# Patient Record
Sex: MALE | Race: WHITE | NOT HISPANIC OR LATINO | Employment: OTHER | URBAN - METROPOLITAN AREA
[De-identification: names, ages, dates, MRNs, and addresses within clinical notes are randomized per-mention and may not be internally consistent; named-entity substitution may affect disease eponyms.]

---

## 2017-06-21 ENCOUNTER — HOSPITAL ENCOUNTER (EMERGENCY)
Facility: CLINIC | Age: 72
Discharge: HOME | End: 2017-06-21
Attending: EMERGENCY MEDICINE | Admitting: EMERGENCY MEDICINE
Payer: MEDICARE

## 2017-06-21 VITALS
TEMPERATURE: 98.4 F | HEART RATE: 129 BPM | OXYGEN SATURATION: 93 % | WEIGHT: 198 LBS | SYSTOLIC BLOOD PRESSURE: 130 MMHG | DIASTOLIC BLOOD PRESSURE: 98 MMHG | RESPIRATION RATE: 16 BRPM | BODY MASS INDEX: 31.01 KG/M2

## 2017-06-21 DIAGNOSIS — T78.40XA ALLERGIC REACTION, INITIAL ENCOUNTER: ICD-10-CM

## 2017-06-21 LAB
ANION GAP SERPL CALCULATED.3IONS-SCNC: 9 MMOL/L (ref 3–14)
BASOPHILS # BLD AUTO: 0 10E9/L (ref 0–0.2)
BASOPHILS NFR BLD AUTO: 0.7 %
BUN SERPL-MCNC: 16 MG/DL (ref 7–30)
CALCIUM SERPL-MCNC: 9 MG/DL (ref 8.5–10.1)
CHLORIDE SERPL-SCNC: 102 MMOL/L (ref 94–109)
CO2 SERPL-SCNC: 28 MMOL/L (ref 20–32)
CREAT SERPL-MCNC: 0.81 MG/DL (ref 0.66–1.25)
DIFFERENTIAL METHOD BLD: ABNORMAL
EOSINOPHIL # BLD AUTO: 0.1 10E9/L (ref 0–0.7)
EOSINOPHIL NFR BLD AUTO: 1.3 %
ERYTHROCYTE [DISTWIDTH] IN BLOOD BY AUTOMATED COUNT: 12.9 % (ref 10–15)
GFR SERPL CREATININE-BSD FRML MDRD: ABNORMAL ML/MIN/1.7M2
GLUCOSE SERPL-MCNC: 164 MG/DL (ref 70–99)
HCT VFR BLD AUTO: 37.5 % (ref 40–53)
HGB BLD-MCNC: 12.8 G/DL (ref 13.3–17.7)
IMM GRANULOCYTES # BLD: 0.1 10E9/L (ref 0–0.4)
IMM GRANULOCYTES NFR BLD: 0.8 %
INTERPRETATION ECG - MUSE: NORMAL
LYMPHOCYTES # BLD AUTO: 1 10E9/L (ref 0.8–5.3)
LYMPHOCYTES NFR BLD AUTO: 16.9 %
MCH RBC QN AUTO: 31 PG (ref 26.5–33)
MCHC RBC AUTO-ENTMCNC: 34.1 G/DL (ref 31.5–36.5)
MCV RBC AUTO: 91 FL (ref 78–100)
MONOCYTES # BLD AUTO: 0.4 10E9/L (ref 0–1.3)
MONOCYTES NFR BLD AUTO: 6.9 %
NEUTROPHILS # BLD AUTO: 4.4 10E9/L (ref 1.6–8.3)
NEUTROPHILS NFR BLD AUTO: 73.4 %
NRBC # BLD AUTO: 0 10*3/UL
NRBC BLD AUTO-RTO: 0 /100
PLATELET # BLD AUTO: 136 10E9/L (ref 150–450)
POTASSIUM SERPL-SCNC: 3.8 MMOL/L (ref 3.4–5.3)
RBC # BLD AUTO: 4.13 10E12/L (ref 4.4–5.9)
SODIUM SERPL-SCNC: 139 MMOL/L (ref 133–144)
WBC # BLD AUTO: 5.9 10E9/L (ref 4–11)

## 2017-06-21 PROCEDURE — 93005 ELECTROCARDIOGRAM TRACING: CPT

## 2017-06-21 PROCEDURE — 99284 EMERGENCY DEPT VISIT MOD MDM: CPT | Mod: 25

## 2017-06-21 PROCEDURE — 96375 TX/PRO/DX INJ NEW DRUG ADDON: CPT

## 2017-06-21 PROCEDURE — 25000128 H RX IP 250 OP 636: Performed by: EMERGENCY MEDICINE

## 2017-06-21 PROCEDURE — 85025 COMPLETE CBC W/AUTO DIFF WBC: CPT | Performed by: EMERGENCY MEDICINE

## 2017-06-21 PROCEDURE — 96361 HYDRATE IV INFUSION ADD-ON: CPT

## 2017-06-21 PROCEDURE — 96372 THER/PROPH/DIAG INJ SC/IM: CPT | Mod: XS

## 2017-06-21 PROCEDURE — 80048 BASIC METABOLIC PNL TOTAL CA: CPT | Performed by: EMERGENCY MEDICINE

## 2017-06-21 PROCEDURE — 96374 THER/PROPH/DIAG INJ IV PUSH: CPT

## 2017-06-21 RX ORDER — METHYLPREDNISOLONE SODIUM SUCCINATE 125 MG/2ML
125 INJECTION, POWDER, LYOPHILIZED, FOR SOLUTION INTRAMUSCULAR; INTRAVENOUS ONCE
Status: COMPLETED | OUTPATIENT
Start: 2017-06-21 | End: 2017-06-21

## 2017-06-21 RX ORDER — EPINEPHRINE 1 MG/ML
0.3 INJECTION INTRAMUSCULAR; INTRAVENOUS; SUBCUTANEOUS ONCE
Status: COMPLETED | OUTPATIENT
Start: 2017-06-21 | End: 2017-06-21

## 2017-06-21 RX ORDER — LIDOCAINE 40 MG/G
CREAM TOPICAL
Status: DISCONTINUED | OUTPATIENT
Start: 2017-06-21 | End: 2017-06-21 | Stop reason: HOSPADM

## 2017-06-21 RX ORDER — AMLODIPINE BESYLATE 5 MG/1
5 TABLET ORAL DAILY
Qty: 30 TABLET | Refills: 0 | Status: SHIPPED | OUTPATIENT
Start: 2017-06-21 | End: 2023-09-08

## 2017-06-21 RX ORDER — METHYLPREDNISOLONE SODIUM SUCCINATE 125 MG/2ML
125 INJECTION, POWDER, LYOPHILIZED, FOR SOLUTION INTRAMUSCULAR; INTRAVENOUS ONCE
Status: DISCONTINUED | OUTPATIENT
Start: 2017-06-21 | End: 2017-06-21

## 2017-06-21 RX ORDER — PREDNISONE 20 MG/1
TABLET ORAL
Qty: 10 TABLET | Refills: 0 | Status: SHIPPED | OUTPATIENT
Start: 2017-06-21 | End: 2023-09-03

## 2017-06-21 RX ORDER — SODIUM CHLORIDE 9 MG/ML
1000 INJECTION, SOLUTION INTRAVENOUS CONTINUOUS
Status: DISCONTINUED | OUTPATIENT
Start: 2017-06-21 | End: 2017-06-21 | Stop reason: HOSPADM

## 2017-06-21 RX ADMIN — METHYLPREDNISOLONE SODIUM SUCCINATE 125 MG: 125 INJECTION, POWDER, FOR SOLUTION INTRAMUSCULAR; INTRAVENOUS at 15:31

## 2017-06-21 RX ADMIN — EPINEPHRINE 0.3 MG: 1 INJECTION INTRAMUSCULAR; INTRAVENOUS; SUBCUTANEOUS at 15:31

## 2017-06-21 RX ADMIN — RANITIDINE HYDROCHLORIDE 50 MG: 25 INJECTION INTRAMUSCULAR; INTRAVENOUS at 15:31

## 2017-06-21 RX ADMIN — SODIUM CHLORIDE 1000 ML: 9 INJECTION, SOLUTION INTRAVENOUS at 15:32

## 2017-06-21 ASSESSMENT — ENCOUNTER SYMPTOMS
SPEECH DIFFICULTY: 1
NUMBNESS: 1
FACIAL ASYMMETRY: 1

## 2017-06-21 NOTE — ED NOTES
Patient presents with complaint of left sided facial swelling that started approx 30 minutes prior to arrival. ABC intact, A&Ox4.

## 2017-06-21 NOTE — DISCHARGE INSTRUCTIONS
Local Allergic Reaction, Other  You are having an allergic reaction. Almost anything can cause one. Different people are allergic to different things. It is usually something that you ate or swallowed, came into contact with by getting or putting it on your skin or clothes, or something you breathed in the air. This can be very annoying and sometimes scary.  Symptoms of an allergic reaction can include:    Rash, hives, redness, welts, blisters    Itching, burning, stinging, pain    Dry, flaky, cracking, scaly skin    Swelling of the face, lips or other parts of the body  Sometimes the cause of an allergic reaction may be obvious. To help identify your allergen, remember:    When it started    What you were doing at the time or just before that    Any activities you were involved in    Any new products or contacts  Here are some common causes, but remember almost anything can cause a reaction, and you may not even be aware that you came into contact with one of these things.    Dust, mold, pollen    Plants such as poison ivy and poison oak are common ones, but there are many others    Animals    Foods such as shrimp, shellfish, peanuts, milk products, gluten, eggs; also colorings, flavorings, additives    Insect bites or stings such as bees, mosquitos, flees, ticks    Medicines such as penicillin, sulfa drugs, amoxicillin, aspirin, ibuprofen; any medicine can cause a reaction    Jewelry such as nickel, gold  (new, or something you ve worn for a while including zippers, and  buttons)    Latex such as in gloves, clothes, toys, balloons, or some tapes (some people allergic to latex may also have problems with foods like bananas, avocados, kiwi, papaya, or chestnuts)    Lotions, perfumes, cosmetics, soaps, shampoos, skincare products, nail products    Chemicals or dyes in clothing, linen, , hair dyes, soaps, iodine  Home care    The goal of our treatment is to help relieve the symptoms, and get you feeling  better. The rash will usually fade over several days, but can sometimes last a couple of weeks. Over the next couple of days, there may be times when it is gets a little worse, and then better again. Here are some things to do:    If you know what you are allergic to, avoid it because future reactions could be worse than this one.    Avoid tight clothing and anything that heats up your skin (hot showers/baths, direct sunlight) since heat will make itching worse.    An ice pack will relieve local areas of intense itching and redness. Don t put the ice directly on the skin, because it can damage the skin. You can also ice put it in a plastic bag. Wrap it in something like a towel, digna shirt, or cloth.    Oral Benadryl (diphenhydramine) is an antihistamine available at drug and grocery stores. Unless a prescription antihistamine was given, Benadryl may be used to reduce itching if large areas of the skin are involved. It may make you sleepy, so be careful using it in the daytime or when going to school, working, or driving. [NOTE: Do not use Benadryl if you have glaucoma or if you are a man with trouble urinating due to an enlarged prostate.] There are antihistamines that causes less drowsiness and is a good alternatives for daytime use. Ask your pharmacist for suggestions.    Do not use Benadryl cream on your skin, because in some people it can cause a further reaction, and make you allergic to Benadryl.    Try not to scratch. This can tear the skin and cause an infection.    Using heat-steam to clean your home, using high-efficiency particulate (HEPA) vacuums and filters, avoiding food and pet triggers, exterminating cockroaches, and frequent house cleaning are a few of the strategies used to decrease allergic reactions.  Follow-up care  Follow up with your healthcare provider, or as advised if your symptoms do not continue to improve or get worse.  Call 911  Call 911 if any of these occur:    Trouble breathing or  swallowing, wheezing    New or worsening swelling in the mouth, throat, or tongue    Hoarse voice or trouble speaking    Confused     Very drowsy or trouble awakening    Fainting or loss of consciousness    Rapid heart rate    Low blood pressure    Feeling of doom    Nausea, vomiting, abdominal pain, diarrhea    Vomiting blood, or large amounts of blood in stool    Seizure  When to seek medical advice  Call your healthcare provider right away if any of the following occur:    Spreading areas of itching, redness or swelling    New or worse swelling in the face, eyelids, or  lips    Dizziness, weakness    Signs of infection:    Spreading redness    Increased pain or swelling    Fever of 100.4 F (38 C) or higher, or as directed by your healthcare provider    Colored fluid draining from the inflamed areas  Date Last Reviewed: 7/30/2015 2000-2017 [employer] [address] [place] [address], [place] [address]. All rights reserved. This information is not intended as a substitute for professional medical care. Always follow your healthcare professional's instructions.

## 2017-06-21 NOTE — ED AVS SNAPSHOT
[facility] Emergency Department    [address]    [facility] [address]    Phone:  [phone]    Fax:  [phone]                                       [place]   MRN: [id number]    Department:  [facility] Emergency Department   Date of Visit:  6/21/2017           Patient Information     Date Of Birth          1945        Your diagnoses for this visit were:     Allergic reaction, initial encounter        You were seen by Nate Ordonez DO.      Follow-up Information     Follow up with Kalpesh Dey MD. Call on 6/22/2017.    Specialty:  Family Practice    Why:  To schedule followup    Contact information:    [facility]  [address]  [place] [address]  [phone]          Follow up with [facility] Emergency Department.    Specialty:  EMERGENCY MEDICINE    Why:  If symptoms worsen    Contact information:    [address]  [facility] [id number] [phone]        Discharge Instructions         Local Allergic Reaction, Other  You are having an allergic reaction. Almost anything can cause one. Different people are allergic to different things. It is usually something that you ate or swallowed, came into contact with by getting or putting it on your skin or clothes, or something you breathed in the air. This can be very annoying and sometimes scary.  Symptoms of an allergic reaction can include:    Rash, hives, redness, welts, blisters    Itching, burning, stinging, pain    Dry, flaky, cracking, scaly skin    Swelling of the face, lips or other parts of the body  Sometimes the cause of an allergic reaction may be obvious. To help identify your allergen, remember:    When it started    What you were doing at the time or just before that    Any activities you were involved in    Any new products or contacts  Here are some common causes, but remember almost anything can cause a reaction, and you may not  even be aware that you came into contact with one of these things.    Dust, mold, pollen    Plants such as poison ivy and poison oak are common ones, but there are many others    Animals    Foods such as shrimp, shellfish, peanuts, milk products, gluten, eggs; also colorings, flavorings, additives    Insect bites or stings such as bees, mosquitos, flees, ticks    Medicines such as penicillin, sulfa drugs, amoxicillin, aspirin, ibuprofen; any medicine can cause a reaction    Jewelry such as nickel, gold  (new, or something you ve worn for a while including zippers, and  buttons)    Latex such as in gloves, clothes, toys, balloons, or some tapes (some people allergic to latex may also have problems with foods like bananas, avocados, kiwi, papaya, or chestnuts)    Lotions, perfumes, cosmetics, soaps, shampoos, skincare products, nail products    Chemicals or dyes in clothing, linen, , hair dyes, soaps, iodine  Home care    The goal of our treatment is to help relieve the symptoms, and get you feeling better. The rash will usually fade over several days, but can sometimes last a couple of weeks. Over the next couple of days, there may be times when it is gets a little worse, and then better again. Here are some things to do:    If you know what you are allergic to, avoid it because future reactions could be worse than this one.    Avoid tight clothing and anything that heats up your skin (hot showers/baths, direct sunlight) since heat will make itching worse.    An ice pack will relieve local areas of intense itching and redness. Don t put the ice directly on the skin, because it can damage the skin. You can also ice put it in a plastic bag. Wrap it in something like a towel, digna shirt, or cloth.    Oral Benadryl (diphenhydramine) is an antihistamine available at drug and grocery stores. Unless a prescription antihistamine was given, Benadryl may be used to reduce itching if large areas of the skin are  involved. It may make you sleepy, so be careful using it in the daytime or when going to school, working, or driving. [NOTE: Do not use Benadryl if you have glaucoma or if you are a man with trouble urinating due to an enlarged prostate.] There are antihistamines that causes less drowsiness and is a good alternatives for daytime use. Ask your pharmacist for suggestions.    Do not use Benadryl cream on your skin, because in some people it can cause a further reaction, and make you allergic to Benadryl.    Try not to scratch. This can tear the skin and cause an infection.    Using heat-steam to clean your home, using high-efficiency particulate (HEPA) vacuums and filters, avoiding food and pet triggers, exterminating cockroaches, and frequent house cleaning are a few of the strategies used to decrease allergic reactions.  Follow-up care  Follow up with your healthcare provider, or as advised if your symptoms do not continue to improve or get worse.  Call 911  Call 911 if any of these occur:    Trouble breathing or swallowing, wheezing    New or worsening swelling in the mouth, throat, or tongue    Hoarse voice or trouble speaking    Confused     Very drowsy or trouble awakening    Fainting or loss of consciousness    Rapid heart rate    Low blood pressure    Feeling of doom    Nausea, vomiting, abdominal pain, diarrhea    Vomiting blood, or large amounts of blood in stool    Seizure  When to seek medical advice  Call your healthcare provider right away if any of the following occur:    Spreading areas of itching, redness or swelling    New or worse swelling in the face, eyelids, or  lips    Dizziness, weakness    Signs of infection:    Spreading redness    Increased pain or swelling    Fever of 100.4 F (38 C) or higher, or as directed by your healthcare provider    Colored fluid draining from the inflamed areas  Date Last Reviewed: 7/30/2015 2000-2017 [employer]. [address] [place] [address], [facility] [place]  [address]. All rights reserved. This information is not intended as a substitute for professional medical care. Always follow your healthcare professional's instructions.          24 Hour Appointment Hotline       To make an appointment at any [facility], call [phone] ([phone]). If you don't have a family doctor or clinic, we will help you find one. [facility] clinics are conveniently located to serve the needs of you and your family.             Review of your medicines      START taking        Dose / Directions Last dose taken    amLODIPine 5 MG tablet   Commonly known as:  NORVASC   Dose:  5 mg   Quantity:  30 tablet        Take 1 tablet (5 mg) by mouth daily   Refills:  0        predniSONE 20 MG tablet   Commonly known as:  DELTASONE   Quantity:  10 tablet        Take two tablets (= 40mg) each day for 5 (five) days   Refills:  0          Our records show that you are taking the medicines listed below. If these are incorrect, please call your family doctor or clinic.        Dose / Directions Last dose taken    ASPIRIN PO   Dose:  81 mg        Take 81 mg by mouth daily   Refills:  0        dutasteride 0.5 MG capsule   Commonly known as:  AVODART   Dose:  0.5 mg        Take 0.5 mg by mouth daily   Refills:  0        ESCITALOPRAM OXALATE PO   Dose:  10 mg        Take 10 mg by mouth daily   Refills:  0        HYDROcodone-acetaminophen  MG per tablet   Commonly known as:  NORCO   Dose:  1 tablet        Take 1 tablet by mouth every 6 hours as needed for moderate to severe pain   Refills:  0        LORAZEPAM PO   Dose:  1 mg        Take 1 mg by mouth   Refills:  0        SIMVASTATIN PO   Dose:  40 mg        Take 40 mg by mouth At Bedtime   Refills:  0        TAMSULOSIN HCL PO   Dose:  0.4 mg        Take 0.4 mg by mouth   Refills:  0          STOP taking        Dose Reason for stopping Comments    LISINOPRIL PO                      Prescriptions were sent or printed at these locations (2 Prescriptions)                    Other Prescriptions                Printed at Department/Unit printer (2 of 2)         predniSONE (DELTASONE) 20 MG tablet               amLODIPine (NORVASC) 5 MG tablet                Procedures and tests performed during your visit     Basic metabolic panel    CBC with platelets differential    EKG 12 lead    Peripheral IV catheter      Orders Needing Specimen Collection     None      Pending Results     No orders found from 6/19/2017 to 6/22/2017.            Pending Culture Results     No orders found from 6/19/2017 to 6/22/2017.            Pending Results Instructions     If you had any lab results that were not finalized at the time of your Discharge, you can call the ED Lab Result RN at [phone]. You will be contacted by this team for any positive Lab results or changes in treatment. The nurses are available 7 days a week from 10A to 6:30P.  You can leave a message 24 hours per day and they will return your call.        Test Results From Your Hospital Stay        6/21/2017  4:11 PM      Component Results     Component Value Ref Range & Units Status    WBC 5.9 4.0 - 11.0 10e9/L Final    RBC Count 4.13 (L) 4.4 - 5.9 10e12/L Final    Hemoglobin 12.8 (L) 13.3 - 17.7 g/dL Final    Hematocrit 37.5 (L) 40.0 - 53.0 % Final    MCV 91 78 - 100 fl Final    MCH 31.0 26.5 - 33.0 pg Final    MCHC 34.1 31.5 - 36.5 g/dL Final    RDW 12.9 10.0 - 15.0 % Final    Platelet Count 136 (L) 150 - 450 10e9/L Final    Diff Method Automated Method  Final    % Neutrophils 73.4 % Final    % Lymphocytes 16.9 % Final    % Monocytes 6.9 % Final    % Eosinophils 1.3 % Final    % Basophils 0.7 % Final    % Immature Granulocytes 0.8 % Final    Nucleated RBCs 0 0 /100 Final    Absolute Neutrophil 4.4 1.6 - 8.3 10e9/L Final    Absolute Lymphocytes 1.0 0.8 - 5.3 10e9/L Final    Absolute Monocytes 0.4 0.0 - 1.3 10e9/L Final    Absolute Eosinophils 0.1 0.0 - 0.7 10e9/L Final    Absolute Basophils 0.0 0.0 - 0.2 10e9/L Final     Abs Immature Granulocytes 0.1 0 - 0.4 10e9/L Final    Absolute Nucleated RBC 0.0  Final         6/21/2017  4:25 PM      Component Results     Component Value Ref Range & Units Status    Sodium 139 133 - 144 mmol/L Final    Potassium 3.8 3.4 - 5.3 mmol/L Final    Specimen slightly hemolyzed, potassium may be falsely elevated    Chloride 102 94 - 109 mmol/L Final    Carbon Dioxide 28 20 - 32 mmol/L Final    Anion Gap 9 3 - 14 mmol/L Final    Glucose 164 (H) 70 - 99 mg/dL Final    Urea Nitrogen 16 7 - 30 mg/dL Final    Creatinine 0.81 0.66 - 1.25 mg/dL Final    GFR Estimate >90  Non  GFR Calc   >60 mL/min/1.7m2 Final    GFR Estimate If Black >90   GFR Calc   >60 mL/min/1.7m2 Final    Calcium 9.0 8.5 - 10.1 mg/dL Final                Clinical Quality Measure: Blood Pressure Screening     Your blood pressure was checked while you were in the emergency department today. The last reading we obtained was  BP: (!) 144/95 . Please read the guidelines below about what these numbers mean and what you should do about them.  If your systolic blood pressure (the top number) is less than 120 and your diastolic blood pressure (the bottom number) is less than 80, then your blood pressure is normal. There is nothing more that you need to do about it.  If your systolic blood pressure (the top number) is 120-139 or your diastolic blood pressure (the bottom number) is 80-89, your blood pressure may be higher than it should be. You should have your blood pressure rechecked within a year by a primary care provider.  If your systolic blood pressure (the top number) is 140 or greater or your diastolic blood pressure (the bottom number) is 90 or greater, you may have high blood pressure. High blood pressure is treatable, but if left untreated over time it can put you at risk for heart attack, stroke, or kidney failure. You should have your blood pressure rechecked by a primary care provider within the next 4  "weeks.  If your provider in the emergency department today gave you specific instructions to follow-up with your doctor or provider even sooner than that, you should follow that instruction and not wait for up to 4 weeks for your follow-up visit.        Thank you for choosing [facility]       Thank you for choosing [facility] for your care. Our goal is always to provide you with excellent care. Hearing back from our patients is one way we can continue to improve our services. Please take a few minutes to complete the written survey that you may receive in the mail after you visit with us. Thank you!        [email or web address] Information     [employer] lets you send messages to your doctor, view your test results, renew your prescriptions, schedule appointments and more. To sign up, go to [email or web address] . Click on \"Log in\" on the left side of the screen, which will take you to the Welcome page. Then click on \"Sign up Now\" on the right side of the page.     You will be asked to enter the access code listed below, as well as some personal information. Please follow the directions to create your username and password.     Your access code is: [email or web address]  Expires: 2017  5:07 PM     Your access code will  in 90 days. If you need help or a new code, please call your [facility] clinic or [phone].        Care EveryWhere ID     This is your Care EveryWhere ID. This could be used by other organizations to access your [facility] medical records  [id number]        Equal Access to Services     RAMSEY KATHLEEN : Hadii santo Evans, waaxda luboo, qaybta kaalmada fernando, demetra kumar. So [place] -[phone].    ATENCIÓN: Si habla español, tiene a oden disposición servicios gratuitos de asistencia lingüística. Llame al [phone].    We comply with applicable federal civil rights laws and [place] laws. We do not discriminate on the basis of race, color, national origin, age, " disability sex, sexual orientation or gender identity.            After Visit Summary       This is your record. Keep this with you and show to your community pharmacist(s) and doctor(s) at your next visit.

## 2017-06-21 NOTE — ED AVS SNAPSHOT
[facility] Emergency Department    [address]    [facility] [address]    Phone:  [phone]    Fax:  [phone]                                       [place]   MRN: [id number]    Department:  [facility] Emergency Department   Date of Visit:  6/21/2017           After Visit Summary Signature Page     I have received my discharge instructions, and my questions have been answered. I have discussed any challenges I see with this plan with the nurse or doctor.    ..........................................................................................................................................  Patient/Patient Representative Signature      ..........................................................................................................................................  Patient Representative Print Name and Relationship to Patient    ..................................................               ................................................  Date                                            Time    ..........................................................................................................................................  Reviewed by Signature/Title    ...................................................              ..............................................  Date                                                            Time

## 2017-06-21 NOTE — ED PROVIDER NOTES
History     Chief Complaint:  Facial numbness     The history is provided by the patient.      Migue Beach is a 71 year old male who presents with left sided facial numbness. This started at 1430 while he was relaxing and he states the swelling causing this feeling has maintained size. Wife notes he has had some speech difficulty. Patient has no sick contacts. He denies history of facial infection. His wife states that he was outside and she notes an area on the outside of his cheek that could be a bug bite.    Cardiac Risk Factors   Sex: Male   Tobacco: Positive   Hypertension: Positive  Diabetes: Negative  Hyperlipidemia: Positive  Family History: Negative    Allergies:  No known drug allergies.    Medications:    Avodart  Tamsulosin   Escitalopram oxalate  Simvastatin  Lisinopril  Aspirin  Lorazepam  Norco     Past Medical History:    Depression disorder  HTN  HLD    Past Surgical History:    Colonoscopy    Family History:    History reviewed. No pertinent family history.    Social History:  Marital Status:   Presents to the ED with his wife.   Tobacco Use: Former smoker  Alcohol Use: Occasionally   PCP: Kalpesh Dey     Review of Systems   Neurological: Positive for facial asymmetry, speech difficulty and numbness.   All other systems reviewed and are negative.      Physical Exam   First Vitals:  Patient Vitals for the past 24 hrs:   BP Temp Temp src Pulse Resp SpO2 Weight   06/21/17 1506 - 98.4  F (36.9  C) Oral - - - 89.8 kg (198 lb)   06/21/17 1503 (!) 198/113 - - 129 18 96 % -       Physical Exam  Constitutional: Patient appears well-developed and well-nourished.   HENT:   Head: No external signs of trauma. No lesions noted.  Eyes: PEERL, EOMI B/L, no pain or limitation of superior gaze  Ears: Normal B/L TM and external canals  Nose: Noncongested, no exudates. No rhinorrhea. No FB noted  Mouth/Throat:    Mucous membranes are moist and normal.    No Oropharyngeal exudate. No oropharyngeal  erythema noted.   No tonsilar swelling noted.    No uvular deviation noted.   No swelling noted on the floor of the mouth   There is swelling on the inner left lower lip/cheek c/w possible allergic reaction/angioedema.  Neck:  FROM. Neck is supple. No stridor  Cardiovascular: Normal rate, regular rhythm, normal heart sounds and intact distal pulses.    Pulmonary/Chest: Effort normal and breath sounds normal. No respiratory distress. No wheezes noted.   Abdominal: Soft. There is no tenderness. There is no rebound.   Neurological:    Patient is alert and oriented to person, place, and time.    Speech is fluent, cognition is normal.   CN 2-12 intact (PERRL, EOMI, symmetric smile, equal eye squeeze and forehead raise, normal and equal sensation to bilateral forehead/cheek/chin, equal hearing to finger rub, midline tongue protrusion with nl side-to-side movement, normal shoulder shrug).    RUE strength 5/5: , finger abd, wrist flex/ext, elbow flex/ext.    LUE strength 5/5: , finger abd, wrist flex/ext, elbow flex/ext.    RLE strength 5/5: ankle flex/ext, knee flex/ext, hip flex.    LLE strength 5/5: ankle flex/ext, knee flex/ext, hip flex.    Sensation equal in all 4 extremities.    No arm drift.     Cerebellar: Normal rapid alternating movements     ( finger-nose-finger, rapid pronation/supination, hand rolling)    Normal heel-to-shin   Normal gait.   Skin: Skin is warm and dry.       Emergency Department Course   ECG:  Sinus Tachycardia  Nonspecific ST abnormality  Abnormal EKG  Rate: 122. KS: 154. QRS: 84. QTc: 475.  No change noted from 4/10/2010  Interpreted by me at 1508 on 6/21/2017    Laboratory:  CBC:  WBC 5.9, HGB 12.8 (L),  (L)  BMP: Glucose 164 (H), otherwise WNL (Creatinine 0.81)    Interventions:  1531: Epinephrine, 0.3 mg, IV injection  1531: Solu-medrol, 125 mg, IV injection   1531: Zantac, 50 mg, IV injection   1532: Normal Saline, 1 liter, IV bolus    Emergency Department Course:  Nursing  notes and vitals reviewed.  I performed an exam of the patient as documented above.  The above workup was undertaken.  1654: I rechecked the patient and discussed results.  Findings and plan explained to the patient and spouse who consents to admission.   1700: Re-evaluated the patient. His intra-oral swelling is completely resolved. He feels well. No dyspnea, wheezing, stridor, swelling, or throat pain. No hives noted.  1714: I consulted with Tangela Smiley PA-C of the patient's primary care office.  Findings and plan explained to the patient. Patient discharged home with instructions regarding supportive care, medications, and reasons to return. The importance of close follow-up was reviewed. The patient was prescribed Norvasc and prednisone.     Impression & Plan      Medical Decision Making:  Patient presents to the ED for evaluation of a sensation of left lip numbness. He was found to have what looked to be swelling consistent with angioedema on the inside of his lip. His wife also noted an area opposite that on the outside of his cheek consistent with a possible bug bite. I did not notice any hives or erythema over that site. The patient responded very well to medications in the ED. On recheck, there was no intraoral swelling. The patient's exam remains normal. He would like to be discharged home. His heart rate has improved. I discussed with the patient and the wife that this could be some kind of bug bite or it could have potentially have been from his lisinopril. I discussed the case with the PA on call for the patient's PCP. She states that changing the Lisinopril to Norvasc is reasonable. She believes his PCP is out of the office the rest of this week and next so she encouraged follow-up with any of the providers at the office for a blood pressure re-check. I will stop the lisinopril and have the patient follow up with someone at his clinic. I will replace the lisinopril with Norvasc. Full anticipatory  guidance given prior to discharge.    Diagnosis:    ICD-10-CM    1. Allergic reaction, initial encounter T78.40XA        Disposition:  Discharged to home.     Discharge Medications:  New Prescriptions    AMLODIPINE (NORVASC) 5 MG TABLET    Take 1 tablet (5 mg) by mouth daily    PREDNISONE (DELTASONE) 20 MG TABLET    Take two tablets (= 40mg) each day for 5 (five) days         Ngoc FRAZIER, am serving as a scribe on 6/21/2017 at 3:02 PM to personally document services performed by Dr. Ordonez based on my observations and the provider's statements to me.   [facility] EMERGENCY DEPARTMENT       Nate Ordonez DO  06/21/17 1744

## 2023-01-01 ENCOUNTER — LAB REQUISITION (OUTPATIENT)
Dept: LAB | Facility: CLINIC | Age: 78
End: 2023-01-01
Payer: COMMERCIAL

## 2023-01-01 ENCOUNTER — TRANSITIONAL CARE UNIT VISIT (OUTPATIENT)
Dept: GERIATRICS | Facility: CLINIC | Age: 78
End: 2023-01-01
Payer: COMMERCIAL

## 2023-01-01 ENCOUNTER — DISCHARGE SUMMARY NURSING HOME (OUTPATIENT)
Dept: GERIATRICS | Facility: CLINIC | Age: 78
End: 2023-01-01
Payer: COMMERCIAL

## 2023-01-01 ENCOUNTER — HEALTH MAINTENANCE LETTER (OUTPATIENT)
Age: 78
End: 2023-01-01

## 2023-01-01 ENCOUNTER — NURSING HOME VISIT (OUTPATIENT)
Dept: GERIATRICS | Facility: CLINIC | Age: 78
End: 2023-01-01
Payer: COMMERCIAL

## 2023-01-01 ENCOUNTER — TELEPHONE (OUTPATIENT)
Dept: GERIATRICS | Facility: CLINIC | Age: 78
End: 2023-01-01
Payer: COMMERCIAL

## 2023-01-01 ENCOUNTER — MEDICAL CORRESPONDENCE (OUTPATIENT)
Dept: HEALTH INFORMATION MANAGEMENT | Facility: CLINIC | Age: 78
End: 2023-01-01

## 2023-01-01 VITALS
RESPIRATION RATE: 18 BRPM | OXYGEN SATURATION: 94 % | HEIGHT: 67 IN | SYSTOLIC BLOOD PRESSURE: 110 MMHG | WEIGHT: 173.4 LBS | HEART RATE: 73 BPM | BODY MASS INDEX: 27.21 KG/M2 | DIASTOLIC BLOOD PRESSURE: 69 MMHG | TEMPERATURE: 97.2 F

## 2023-01-01 VITALS
HEART RATE: 98 BPM | TEMPERATURE: 97.5 F | HEIGHT: 67 IN | WEIGHT: 173 LBS | OXYGEN SATURATION: 97 % | RESPIRATION RATE: 16 BRPM | SYSTOLIC BLOOD PRESSURE: 105 MMHG | BODY MASS INDEX: 27.15 KG/M2 | DIASTOLIC BLOOD PRESSURE: 68 MMHG

## 2023-01-01 VITALS
RESPIRATION RATE: 18 BRPM | DIASTOLIC BLOOD PRESSURE: 65 MMHG | BODY MASS INDEX: 27.15 KG/M2 | OXYGEN SATURATION: 96 % | HEART RATE: 96 BPM | WEIGHT: 173 LBS | HEIGHT: 67 IN | TEMPERATURE: 97.5 F | SYSTOLIC BLOOD PRESSURE: 122 MMHG

## 2023-01-01 VITALS
HEART RATE: 89 BPM | BODY MASS INDEX: 27.15 KG/M2 | RESPIRATION RATE: 18 BRPM | WEIGHT: 173 LBS | TEMPERATURE: 97.6 F | SYSTOLIC BLOOD PRESSURE: 118 MMHG | DIASTOLIC BLOOD PRESSURE: 74 MMHG | OXYGEN SATURATION: 94 % | HEIGHT: 67 IN

## 2023-01-01 VITALS
WEIGHT: 173 LBS | RESPIRATION RATE: 18 BRPM | TEMPERATURE: 97.2 F | OXYGEN SATURATION: 96 % | SYSTOLIC BLOOD PRESSURE: 136 MMHG | HEIGHT: 67 IN | BODY MASS INDEX: 27.15 KG/M2 | DIASTOLIC BLOOD PRESSURE: 77 MMHG | HEART RATE: 94 BPM

## 2023-01-01 DIAGNOSIS — E11.69 TYPE 2 DIABETES MELLITUS WITH OTHER SPECIFIED COMPLICATION, WITHOUT LONG-TERM CURRENT USE OF INSULIN (H): ICD-10-CM

## 2023-01-01 DIAGNOSIS — C79.51 PROSTATE CANCER METASTATIC TO BONE (H): Primary | ICD-10-CM
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
## 2023-01-01 DIAGNOSIS — R53.81 PHYSICAL DECONDITIONING: ICD-10-CM

## 2023-01-01 DIAGNOSIS — C79.51 PROSTATE CANCER METASTATIC TO BONE (H): ICD-10-CM

## 2023-01-01 DIAGNOSIS — I10 BENIGN ESSENTIAL HYPERTENSION: ICD-10-CM

## 2023-01-01 DIAGNOSIS — U07.1 COVID-19: ICD-10-CM

## 2023-01-01 DIAGNOSIS — E78.5 DYSLIPIDEMIA: ICD-10-CM

## 2023-01-01 DIAGNOSIS — C61 PROSTATE CANCER METASTATIC TO BONE (H): ICD-10-CM

## 2023-01-01 DIAGNOSIS — C61 PROSTATE CANCER METASTATIC TO BONE (H): Primary | ICD-10-CM

## 2023-01-01 DIAGNOSIS — F41.9 ANXIETY: ICD-10-CM

## 2023-01-01 DIAGNOSIS — R07.81 RIB PAIN: ICD-10-CM

## 2023-01-01 DIAGNOSIS — E11.9 TYPE 2 DIABETES MELLITUS WITHOUT COMPLICATION, WITHOUT LONG-TERM CURRENT USE OF INSULIN (H): ICD-10-CM

## 2023-01-01 DIAGNOSIS — R52 PAIN: ICD-10-CM

## 2023-01-01 DIAGNOSIS — R41.89 COGNITIVE IMPAIRMENT: ICD-10-CM

## 2023-01-01 DIAGNOSIS — K59.01 SLOW TRANSIT CONSTIPATION: ICD-10-CM
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
## 2023-01-01 DIAGNOSIS — D64.9 ANEMIA, UNSPECIFIED TYPE: ICD-10-CM

## 2023-01-01 DIAGNOSIS — F32.A DEPRESSION, UNSPECIFIED DEPRESSION TYPE: ICD-10-CM

## 2023-01-01 DIAGNOSIS — R50.9 FEVER, UNSPECIFIED: ICD-10-CM

## 2023-01-01 DIAGNOSIS — R07.81 RIB PAIN: Primary | ICD-10-CM

## 2023-01-01 DIAGNOSIS — D64.9 ANEMIA, UNSPECIFIED: ICD-10-CM

## 2023-01-01 DIAGNOSIS — I10 ESSENTIAL HYPERTENSION: ICD-10-CM

## 2023-01-01 DIAGNOSIS — C61 MALIGNANT NEOPLASM OF PROSTATE (H): ICD-10-CM

## 2023-01-01 LAB
ALBUMIN SERPL BCG-MCNC: 3.8 G/DL (ref 3.5–5.2)
ALP SERPL-CCNC: 105 U/L (ref 40–150)
ALT SERPL W P-5'-P-CCNC: 13 U/L (ref 0–70)
ANION GAP SERPL CALCULATED.3IONS-SCNC: 10 MMOL/L (ref 7–15)
AST SERPL W P-5'-P-CCNC: 16 U/L (ref 0–45)
BASOPHILS # BLD AUTO: 0 10E3/UL (ref 0–0.2)
BASOPHILS NFR BLD AUTO: 0 %
BILIRUB SERPL-MCNC: 0.2 MG/DL
BUN SERPL-MCNC: 9.6 MG/DL (ref 8–23)
CALCIUM SERPL-MCNC: 8.7 MG/DL (ref 8.8–10.2)
CHLORIDE SERPL-SCNC: 105 MMOL/L (ref 98–107)
CREAT SERPL-MCNC: 0.68 MG/DL (ref 0.67–1.17)
DEPRECATED HCO3 PLAS-SCNC: 26 MMOL/L (ref 22–29)
EGFRCR SERPLBLD CKD-EPI 2021: >90 ML/MIN/1.73M2
EOSINOPHIL # BLD AUTO: 0 10E3/UL (ref 0–0.7)
EOSINOPHIL NFR BLD AUTO: 1 %
ERYTHROCYTE [DISTWIDTH] IN BLOOD BY AUTOMATED COUNT: 12.5 % (ref 10–15)
ERYTHROCYTE [DISTWIDTH] IN BLOOD BY AUTOMATED COUNT: 16 % (ref 10–15)
GLUCOSE SERPL-MCNC: 128 MG/DL (ref 70–99)
HCT VFR BLD AUTO: 34.6 % (ref 40–53)
HCT VFR BLD AUTO: 37 % (ref 40–53)
HGB BLD-MCNC: 11.3 G/DL (ref 13.3–17.7)
HGB BLD-MCNC: 11.7 G/DL (ref 13.3–17.7)
IMM GRANULOCYTES # BLD: 0 10E3/UL
IMM GRANULOCYTES NFR BLD: 0 %
LYMPHOCYTES # BLD AUTO: 1.3 10E3/UL (ref 0.8–5.3)
LYMPHOCYTES NFR BLD AUTO: 27 %
MCH RBC QN AUTO: 29.3 PG (ref 26.5–33)
MCH RBC QN AUTO: 29.5 PG (ref 26.5–33)
MCHC RBC AUTO-ENTMCNC: 31.6 G/DL (ref 31.5–36.5)
MCHC RBC AUTO-ENTMCNC: 32.7 G/DL (ref 31.5–36.5)
MCV RBC AUTO: 90 FL (ref 78–100)
MCV RBC AUTO: 93 FL (ref 78–100)
MONOCYTES # BLD AUTO: 0.3 10E3/UL (ref 0–1.3)
MONOCYTES NFR BLD AUTO: 6 %
NEUTROPHILS # BLD AUTO: 3.3 10E3/UL (ref 1.6–8.3)
NEUTROPHILS NFR BLD AUTO: 66 %
NRBC # BLD AUTO: 0 10E3/UL
NRBC BLD AUTO-RTO: 0 /100
PLATELET # BLD AUTO: 163 10E3/UL (ref 150–450)
PLATELET # BLD AUTO: 251 10E3/UL (ref 150–450)
POTASSIUM SERPL-SCNC: 4 MMOL/L (ref 3.4–5.3)
PROT SERPL-MCNC: 6.2 G/DL (ref 6.4–8.3)
RBC # BLD AUTO: 3.83 10E6/UL (ref 4.4–5.9)
RBC # BLD AUTO: 4 10E6/UL (ref 4.4–5.9)
SARS-COV-2 RNA RESP QL NAA+PROBE: NEGATIVE
SODIUM SERPL-SCNC: 141 MMOL/L (ref 135–145)
WBC # BLD AUTO: 4.9 10E3/UL (ref 4–11)
WBC # BLD AUTO: 6.4 10E3/UL (ref 4–11)

## 2023-01-01 PROCEDURE — 99309 SBSQ NF CARE MODERATE MDM 30: CPT

## 2023-01-01 PROCEDURE — 87635 SARS-COV-2 COVID-19 AMP PRB: CPT | Mod: ORL

## 2023-01-01 PROCEDURE — 36415 COLL VENOUS BLD VENIPUNCTURE: CPT | Mod: ORL

## 2023-01-01 PROCEDURE — 85027 COMPLETE CBC AUTOMATED: CPT | Mod: ORL

## 2023-01-01 PROCEDURE — 99316 NF DSCHRG MGMT 30 MIN+: CPT

## 2023-01-01 PROCEDURE — P9604 ONE-WAY ALLOW PRORATED TRIP: HCPCS | Mod: ORL

## 2023-01-01 PROCEDURE — 85025 COMPLETE CBC W/AUTO DIFF WBC: CPT | Mod: ORL | Performed by: NURSE PRACTITIONER

## 2023-01-01 PROCEDURE — 80053 COMPREHEN METABOLIC PANEL: CPT | Mod: ORL | Performed by: NURSE PRACTITIONER

## 2023-01-01 PROCEDURE — P9603 ONE-WAY ALLOW PRORATED MILES: HCPCS | Mod: ORL | Performed by: NURSE PRACTITIONER

## 2023-01-01 PROCEDURE — 36415 COLL VENOUS BLD VENIPUNCTURE: CPT | Mod: ORL | Performed by: NURSE PRACTITIONER

## 2023-01-01 RX ORDER — LIDOCAINE 4 G/G
1 PATCH TOPICAL EVERY 24 HOURS
COMMUNITY
End: 2024-01-01

## 2023-01-01 RX ORDER — OXYCODONE HYDROCHLORIDE 5 MG/1
2.5-5 TABLET ORAL EVERY 4 HOURS PRN
Qty: 30 TABLET | Refills: 0 | Status: SHIPPED | OUTPATIENT
Start: 2023-01-01 | End: 2023-01-01 | Stop reason: DRUGHIGH

## 2023-01-01 RX ORDER — ACETAMINOPHEN 500 MG
1000 TABLET ORAL 3 TIMES DAILY
COMMUNITY

## 2023-01-01 RX ORDER — OXYCODONE HYDROCHLORIDE 5 MG/1
7.5 TABLET ORAL EVERY 4 HOURS
Qty: 120 TABLET | Refills: 0 | Status: SHIPPED | OUTPATIENT
Start: 2023-01-01 | End: 2024-01-01

## 2023-01-01 RX ORDER — NALOXONE HYDROCHLORIDE 0.4 MG/ML
.1-.4 INJECTION, SOLUTION INTRAMUSCULAR; INTRAVENOUS; SUBCUTANEOUS ONCE
Status: DISCONTINUED | OUTPATIENT
Start: 2023-01-01 | End: 2023-01-01

## 2023-01-01 RX ORDER — OXYCODONE HYDROCHLORIDE 5 MG/1
TABLET ORAL
Qty: 40 TABLET | Refills: 0 | Status: SHIPPED | OUTPATIENT
Start: 2023-01-01 | End: 2023-01-01

## 2023-01-01 RX ORDER — OXYCODONE HYDROCHLORIDE 5 MG/1
2.5 TABLET ORAL
Qty: 0.5 TABLET | Refills: 0 | Status: SHIPPED | OUTPATIENT
Start: 2023-01-01 | End: 2023-01-01

## 2023-01-01 RX ORDER — OXYCODONE HYDROCHLORIDE 5 MG/1
7.5 TABLET ORAL EVERY 4 HOURS
Qty: 60 TABLET | Refills: 0 | Status: SHIPPED | OUTPATIENT
Start: 2023-01-01 | End: 2023-01-01

## 2023-01-01 RX ORDER — OXYCODONE HYDROCHLORIDE 5 MG/1
TABLET ORAL
Qty: 60 TABLET | Refills: 0 | Status: SHIPPED | OUTPATIENT
Start: 2023-01-01 | End: 2023-01-01

## 2023-01-01 RX ORDER — OXYCODONE HYDROCHLORIDE 5 MG/1
5 TABLET ORAL EVERY 4 HOURS
Qty: 60 TABLET | Refills: 0 | Status: SHIPPED | OUTPATIENT
Start: 2023-01-01 | End: 2023-01-01

## 2023-03-01 ENCOUNTER — APPOINTMENT (OUTPATIENT)
Dept: CT IMAGING | Facility: CLINIC | Age: 78
End: 2023-03-01
Attending: PHYSICIAN ASSISTANT
Payer: COMMERCIAL

## 2023-03-01 ENCOUNTER — HOSPITAL ENCOUNTER (EMERGENCY)
Facility: CLINIC | Age: 78
Discharge: HOME | End: 2023-03-01
Attending: PHYSICIAN ASSISTANT | Admitting: PHYSICIAN ASSISTANT
Payer: COMMERCIAL

## 2023-03-01 VITALS
RESPIRATION RATE: 18 BRPM | SYSTOLIC BLOOD PRESSURE: 135 MMHG | OXYGEN SATURATION: 95 % | TEMPERATURE: 98.9 F | DIASTOLIC BLOOD PRESSURE: 86 MMHG | HEART RATE: 117 BPM

## 2023-03-01 DIAGNOSIS — R55 SYNCOPE, UNSPECIFIED SYNCOPE TYPE: ICD-10-CM

## 2023-03-01 DIAGNOSIS — R93.89 ABNORMAL CHEST CT: ICD-10-CM

## 2023-03-01 DIAGNOSIS — R00.0 SINUS TACHYCARDIA: ICD-10-CM

## 2023-03-01 DIAGNOSIS — R81 GLYCOSURIA: ICD-10-CM

## 2023-03-01 LAB
ALBUMIN UR-MCNC: 20 MG/DL
ANION GAP SERPL CALCULATED.3IONS-SCNC: 14 MMOL/L (ref 7–15)
APPEARANCE UR: CLEAR
BASOPHILS # BLD AUTO: 0 10E3/UL (ref 0–0.2)
BASOPHILS NFR BLD AUTO: 0 %
BILIRUB UR QL STRIP: NEGATIVE
BUN SERPL-MCNC: 12.9 MG/DL (ref 8–23)
CALCIUM SERPL-MCNC: 9.3 MG/DL (ref 8.8–10.2)
CHLORIDE SERPL-SCNC: 97 MMOL/L (ref 98–107)
COLOR UR AUTO: ABNORMAL
CREAT SERPL-MCNC: 0.57 MG/DL (ref 0.67–1.17)
D DIMER PPP FEU-MCNC: 1.89 UG/ML FEU (ref 0–0.5)
DEPRECATED HCO3 PLAS-SCNC: 22 MMOL/L (ref 22–29)
EOSINOPHIL # BLD AUTO: 0 10E3/UL (ref 0–0.7)
EOSINOPHIL NFR BLD AUTO: 0 %
ERYTHROCYTE [DISTWIDTH] IN BLOOD BY AUTOMATED COUNT: 12.8 % (ref 10–15)
GFR SERPL CREATININE-BSD FRML MDRD: >90 ML/MIN/1.73M2
GLUCOSE SERPL-MCNC: 189 MG/DL (ref 70–99)
GLUCOSE UR STRIP-MCNC: >=1000 MG/DL
HCT VFR BLD AUTO: 37.8 % (ref 40–53)
HGB BLD-MCNC: 12.4 G/DL (ref 13.3–17.7)
HGB UR QL STRIP: NEGATIVE
HOLD SPECIMEN: NORMAL
HYALINE CASTS: 1 /LPF
IMM GRANULOCYTES # BLD: 0.1 10E3/UL
IMM GRANULOCYTES NFR BLD: 1 %
KETONES UR STRIP-MCNC: 10 MG/DL
LEUKOCYTE ESTERASE UR QL STRIP: NEGATIVE
LYMPHOCYTES # BLD AUTO: 0.9 10E3/UL (ref 0.8–5.3)
LYMPHOCYTES NFR BLD AUTO: 10 %
MCH RBC QN AUTO: 30.7 PG (ref 26.5–33)
MCHC RBC AUTO-ENTMCNC: 32.8 G/DL (ref 31.5–36.5)
MCV RBC AUTO: 94 FL (ref 78–100)
MONOCYTES # BLD AUTO: 0.7 10E3/UL (ref 0–1.3)
MONOCYTES NFR BLD AUTO: 8 %
MUCOUS THREADS #/AREA URNS LPF: PRESENT /LPF
NEUTROPHILS # BLD AUTO: 7.5 10E3/UL (ref 1.6–8.3)
NEUTROPHILS NFR BLD AUTO: 81 %
NITRATE UR QL: NEGATIVE
NRBC # BLD AUTO: 0 10E3/UL
NRBC BLD AUTO-RTO: 0 /100
PH UR STRIP: 5 [PH] (ref 5–7)
PLATELET # BLD AUTO: 174 10E3/UL (ref 150–450)
POTASSIUM SERPL-SCNC: 3.7 MMOL/L (ref 3.4–5.3)
RBC # BLD AUTO: 4.04 10E6/UL (ref 4.4–5.9)
RBC URINE: 1 /HPF
SODIUM SERPL-SCNC: 133 MMOL/L (ref 136–145)
SP GR UR STRIP: 1.02 (ref 1–1.03)
TROPONIN T SERPL HS-MCNC: 7 NG/L
UROBILINOGEN UR STRIP-MCNC: NORMAL MG/DL
WBC # BLD AUTO: 9.2 10E3/UL (ref 4–11)
WBC URINE: 1 /HPF

## 2023-03-01 PROCEDURE — 258N000003 HC RX IP 258 OP 636: Performed by: PHYSICIAN ASSISTANT

## 2023-03-01 PROCEDURE — 71275 CT ANGIOGRAPHY CHEST: CPT

## 2023-03-01 PROCEDURE — 96361 HYDRATE IV INFUSION ADD-ON: CPT

## 2023-03-01 PROCEDURE — 81003 URINALYSIS AUTO W/O SCOPE: CPT | Performed by: PHYSICIAN ASSISTANT

## 2023-03-01 PROCEDURE — 85379 FIBRIN DEGRADATION QUANT: CPT | Performed by: PHYSICIAN ASSISTANT

## 2023-03-01 PROCEDURE — 99285 EMERGENCY DEPT VISIT HI MDM: CPT | Mod: 25

## 2023-03-01 PROCEDURE — 36415 COLL VENOUS BLD VENIPUNCTURE: CPT | Performed by: EMERGENCY MEDICINE

## 2023-03-01 PROCEDURE — 93005 ELECTROCARDIOGRAM TRACING: CPT

## 2023-03-01 PROCEDURE — 70450 CT HEAD/BRAIN W/O DYE: CPT

## 2023-03-01 PROCEDURE — 250N000011 HC RX IP 250 OP 636: Performed by: PHYSICIAN ASSISTANT

## 2023-03-01 PROCEDURE — 84484 ASSAY OF TROPONIN QUANT: CPT | Performed by: EMERGENCY MEDICINE

## 2023-03-01 PROCEDURE — 85025 COMPLETE CBC W/AUTO DIFF WBC: CPT | Performed by: EMERGENCY MEDICINE

## 2023-03-01 PROCEDURE — 250N000013 HC RX MED GY IP 250 OP 250 PS 637: Performed by: PHYSICIAN ASSISTANT

## 2023-03-01 PROCEDURE — 96360 HYDRATION IV INFUSION INIT: CPT | Mod: 59

## 2023-03-01 PROCEDURE — 80048 BASIC METABOLIC PNL TOTAL CA: CPT | Performed by: EMERGENCY MEDICINE

## 2023-03-01 RX ORDER — IOPAMIDOL 755 MG/ML
500 INJECTION, SOLUTION INTRAVASCULAR ONCE
Status: COMPLETED | OUTPATIENT
Start: 2023-03-01 | End: 2023-03-01

## 2023-03-01 RX ORDER — ACETAMINOPHEN 325 MG/1
650 TABLET ORAL ONCE
Status: COMPLETED | OUTPATIENT
Start: 2023-03-01 | End: 2023-03-01

## 2023-03-01 RX ADMIN — SODIUM CHLORIDE 1000 ML: 9 INJECTION, SOLUTION INTRAVENOUS at 19:51

## 2023-03-01 RX ADMIN — IOPAMIDOL 63 ML: 755 INJECTION, SOLUTION INTRAVENOUS at 20:35

## 2023-03-01 RX ADMIN — ACETAMINOPHEN 650 MG: 325 TABLET ORAL at 21:48

## 2023-03-01 ASSESSMENT — ACTIVITIES OF DAILY LIVING (ADL)
ADLS_ACUITY_SCORE: 35
ADLS_ACUITY_SCORE: 35

## 2023-03-02 LAB
ATRIAL RATE - MUSE: 118 BPM
DIASTOLIC BLOOD PRESSURE - MUSE: NORMAL MMHG
INTERPRETATION ECG - MUSE: NORMAL
P AXIS - MUSE: 26 DEGREES
PR INTERVAL - MUSE: 166 MS
QRS DURATION - MUSE: 80 MS
QT - MUSE: 330 MS
QTC - MUSE: 462 MS
R AXIS - MUSE: 25 DEGREES
SYSTOLIC BLOOD PRESSURE - MUSE: NORMAL MMHG
T AXIS - MUSE: 35 DEGREES
VENTRICULAR RATE- MUSE: 118 BPM

## 2023-03-02 NOTE — DISCHARGE INSTRUCTIONS
Your labs and imaging are reassuring today. Continue at-home medications and follow-up with your primary care provider tomorrow.  For any new or worsening symptoms, return to Emergency Department.

## 2023-03-02 NOTE — ED PROVIDER NOTES
History     Chief Complaint:  Fall       HPI   Migue Beach is a 77 year old male with a history of prostate cancer who presents via EMS after syncopal episode. Patient states he was at his oncologist's office today and was feeling fine. When he returned home, patient went to bathroom per spouse and she heard patient fall. Patient does not remember coming home or falling. He is unsure if he tripped or passed out. Wife found patient trying to get up and EMS was called. Patient admits to recurrent falls stating he has had to call the fire department to help him get up after falls. Denies head pain, dizziness, neck pain, chest pain, shortness of breath, extremity pain. No nausea or vomiting. No recent illness or fevers.  by EMS.  Note: Spouse at bedside states patient is decreasing his  medication for prostate cancer due to ongoing dizziness and headache. Patient currently denies dizziness or head pain.    Independent Historian:   Patient and spouse at bedside    Review of External Notes: None    ROS:  Review of Systems   Neurological: Positive for syncope.   All other systems reviewed and are negative.      Allergies:  No Known Allergies     Medications:    amLODIPine (NORVASC) 5 MG tablet  ASPIRIN PO  dutasteride (AVODART) 0.5 MG capsule  ESCITALOPRAM OXALATE PO  HYDROcodone-acetaminophen (NORCO)  MG per tablet  LORAZEPAM PO  predniSONE (DELTASONE) 20 MG tablet  SIMVASTATIN PO  TAMSULOSIN HCL PO      Past Medical History:    Past Medical History:   Diagnosis Date     Depressive disorder      Hypertension        Past Surgical History:    Past Surgical History:   Procedure Laterality Date     COLONOSCOPY          Family History:    family history is not on file.    Social History:   reports that he has quit smoking. He does not have any smokeless tobacco history on file. He reports current alcohol use. He reports that he does not use drugs.  PCP: Kalpesh Dey     Physical Exam     Patient Vitals for  the past 24 hrs:   BP Temp Temp src Pulse Resp SpO2   03/01/23 2145 -- -- -- -- -- 95 %   03/01/23 2144 -- -- -- -- -- 96 %   03/01/23 2143 -- -- -- -- -- 96 %   03/01/23 2142 -- -- -- -- -- 97 %   03/01/23 2141 -- -- -- -- -- 96 %   03/01/23 2140 -- -- -- -- -- 94 %   03/01/23 2139 -- -- -- -- -- 97 %   03/01/23 2138 -- -- -- -- -- 95 %   03/01/23 2132 -- -- -- -- -- 95 %   03/01/23 2131 -- -- -- -- -- 95 %   03/01/23 2130 135/86 -- -- 117 -- 95 %   03/01/23 2102 -- -- -- -- -- 96 %   03/01/23 2101 -- -- -- -- -- 97 %   03/01/23 2100 (!) 142/81 -- -- 116 -- 96 %   03/01/23 2011 -- -- -- 112 -- --   03/01/23 2010 -- -- -- -- -- 93 %   03/01/23 2009 -- -- -- -- -- 93 %   03/01/23 2008 -- -- -- -- -- 94 %   03/01/23 2007 -- -- -- -- -- 94 %   03/01/23 2006 -- -- -- -- -- 94 %   03/01/23 2005 -- -- -- -- -- 93 %   03/01/23 2004 -- -- -- -- -- 93 %   03/01/23 2003 -- -- -- -- -- 93 %   03/01/23 2002 -- -- -- -- -- 92 %   03/01/23 1905 110/71 -- -- (!) 124 -- 95 %   03/01/23 1900 136/85 -- -- 120 -- --   03/01/23 1855 132/78 -- -- -- -- --   03/01/23 1850 -- -- -- -- -- 96 %   03/01/23 1845 128/88 -- -- 119 -- 94 %   03/01/23 1830 136/77 -- -- 110 -- 95 %   03/01/23 1820 125/68 -- -- 106 -- 94 %   03/01/23 1806 138/74 98.9  F (37.2  C) Oral 109 18 99 %        Physical Exam  Constitutional: Alert, attentive, GCS 15  HENT:    Nose: Nose normal.    Mouth/Throat: Oropharynx is clear, mucous membranes are moist   Eyes: EOM are normal.   CV: regular rate and rhythm; no murmurs, rubs or gallups. No unilateral leg swelling or pain.  Chest: Effort normal and breath sounds normal.   GI:  There is no tenderness. No distension. Normal bowel sounds  MSK: Normal range of motion.   Neurological: Alert, attentive, Oriented x 3. No facial asymmetry. CN II-XII intact. 5/5 strength in upper and lower extremities. Normal range of motion in all four extremities. Distal sensation in hands and feet intact.  Skin: Skin is warm and  dry.      Emergency Department Course     ECG results from 03/01/23   EKG 12 lead     Value    Systolic Blood Pressure     Diastolic Blood Pressure     Ventricular Rate 118    Atrial Rate 118    OR Interval 166    QRS Duration 80        QTc 462    P Axis 26    R AXIS 25    T Axis 35    Interpretation ECG      Sinus tachycardia  Low voltage QRS  Borderline ECG  When compared with ECG of 21-JUN-2017 15:05,  No significant change was found         Imaging:  CT Chest Pulmonary Embolism w Contrast   Final Result   IMPRESSION:      1.  No pulmonary embolism.      2.  Metastatic disease from history of prostate cancer again noted. Stable to perhaps marginally increased left supraclavicular adenopathy. Incompletely seen abdominal retroperitoneal adenopathy appears mildly increased since October 2022.      3.  Mild airway thickening and pulmonary mosaicism, suggesting airways disease and/or gas trapping.      4.  No focal consolidation or obvious pneumonia.      5.  Severe coronary calcifications.            Head CT w/o contrast   Final Result   IMPRESSION:   1.  No CT evidence for acute intracranial process.   2.  Mild chronic microvascular ischemic changes as above.         Report per radiology    Laboratory:  Labs Ordered and Resulted from Time of ED Arrival to Time of ED Departure   BASIC METABOLIC PANEL - Abnormal       Result Value    Sodium 133 (*)     Potassium 3.7      Chloride 97 (*)     Carbon Dioxide (CO2) 22      Anion Gap 14      Urea Nitrogen 12.9      Creatinine 0.57 (*)     Calcium 9.3      Glucose 189 (*)     GFR Estimate >90     CBC WITH PLATELETS AND DIFFERENTIAL - Abnormal    WBC Count 9.2      RBC Count 4.04 (*)     Hemoglobin 12.4 (*)     Hematocrit 37.8 (*)     MCV 94      MCH 30.7      MCHC 32.8      RDW 12.8      Platelet Count 174      % Neutrophils 81      % Lymphocytes 10      % Monocytes 8      % Eosinophils 0      % Basophils 0      % Immature Granulocytes 1      NRBCs per 100 WBC 0       Absolute Neutrophils 7.5      Absolute Lymphocytes 0.9      Absolute Monocytes 0.7      Absolute Eosinophils 0.0      Absolute Basophils 0.0      Absolute Immature Granulocytes 0.1      Absolute NRBCs 0.0     D DIMER QUANTITATIVE - Abnormal    D-Dimer Quantitative 1.89 (*)    UA MACROSCOPIC WITH REFLEX TO MICRO AND CULTURE - Abnormal    Color Urine Light Yellow      Appearance Urine Clear      Glucose Urine >=1000 (*)     Bilirubin Urine Negative      Ketones Urine 10 (*)     Specific Gravity Urine 1.023      Blood Urine Negative      pH Urine 5.0      Protein Albumin Urine 20 (*)     Urobilinogen Urine Normal      Nitrite Urine Negative      Leukocyte Esterase Urine Negative      Mucus Urine Present (*)     RBC Urine 1      WBC Urine 1      Hyaline Casts Urine 1     TROPONIN T, HIGH SENSITIVITY - Normal    Troponin T, High Sensitivity 7          Emergency Department Course & Assessments:     Interventions:  Medications   0.9% sodium chloride BOLUS (0 mLs Intravenous Stopped 3/1/23 2133)   iopamidol (ISOVUE-370) solution 500 mL (63 mLs Intravenous $Given 3/1/23 2035)   sodium chloride (PF) 0.9% PF flush 100 mL (82 mLs Intravenous $Given 3/1/23 2035)   acetaminophen (TYLENOL) tablet 650 mg (650 mg Oral $Given 3/1/23 2148)        Independent Interpretation (X-rays, CTs, rhythm strip):  None    Consultations/Discussion of Management or Tests:  1920 Dr. Sarmiento consulted.       Social Determinants of Health affecting care:   None    Assessments:  1825  I obtained patient history and performed physical examination as above.  2200 I rechecked patient and updated him on findings.    Disposition:  The patient was discharged to home.     Impression & Plan    CMS Diagnoses: None    Medical Decision Making:  Patient pleasant 78 yo M who presents for syncopal episode at home this afternoon. He is in no acute distress on arrival. He is tachycardic with HR in the 120s, nonhypoxic. Physical examination reveals no neurological  deficits. Benign cardiac and pulmonary exam. Differential includes orthostatic hypotension, hypoglycemia, PE, ACS.  Preliminary labs obtained. HGB is 12.4 which appears to be baseline. Orthostatics are normal despite stable elevated HR.  I spoke with Dr. Sarmiento who recommends assessment of PE and infection. Patient has no upper respiratory symptoms, fever, or chills. UA is significant for glycosuria. No evidence of infection or hematuria. No history of diabetes however patient has no evidence of acidosis or findings to suspect diabetic complications. BS is 189 with normal bicarb.  EKG shows sinus tachycardia with ventricular rate of 118 BPM. Initial troponin is 7. Patient has no chest pain or shortness of breath. ACS unlikely.  D-dimer elevated at 1.89. CT PE study and was negative for PE. Evidence of metastatic disease in chest. Head CT negative for traumatic findings.  Results discussed with patient. He remained slightly tachycardic in the 110s. No evidence of infection, ACS, PE or severe anemia tonight. Patient does endorse baseline anxiety. We discussed hospitalization however patient would like to go home. He passed road test without difficulty and remains hemodynamically stable. Patient at this time is safe for discharge. I recommend he follow-up with primary care tomorrow for further evaluation of tachycardia. Patient agreeable to plan. Supportive cares and return precautions to ED discussed. Patient expressed understanding of plan and was ready for discharge.    Diagnosis:    ICD-10-CM    1. Syncope, unspecified syncope type  R55       2. Sinus tachycardia  R00.0       3. Abnormal chest CT  R93.89       4. Glycosuria  R81            Discharge Medications:  Discharge Medication List as of 3/1/2023 10:35 PM           3/1/2023   Kajal Pedro PA-C Steinbrueck, Emily, PA-C  03/01/23 0435

## 2023-03-02 NOTE — ED TRIAGE NOTES
Arrives to ED from home he fell out of bed reported per EMS pt is unsure if he fell or just collapsed floor. Pt was seen by oncology today for feeling dizzy and weak. The medication for his prostate was decreased today d/t symptoms. Pt wife is on her way. He was unable to get himself from the floor. EMS placed c collar . Pt was BP in 90 130's on arrival. .      Triage Assessment     Row Name 03/01/23 6255       Triage Assessment (Adult)    Airway WDL WDL       Respiratory WDL    Respiratory WDL WDL       Skin Circulation/Temperature WDL    Skin Circulation/Temperature WDL temperature    Skin Temperature warm       Cardiac WDL    Cardiac WDL rhythm    Pulse Rate & Regularity tachycardic    Cardiac Rhythm ST       Cognitive/Neuro/Behavioral WDL    Cognitive/Neuro/Behavioral WDL WDL

## 2023-03-14 ENCOUNTER — HOSPITAL ENCOUNTER (OUTPATIENT)
Dept: NUCLEAR MEDICINE | Facility: CLINIC | Age: 78
Setting detail: NUCLEAR MEDICINE
Discharge: HOME | End: 2023-03-14
Attending: INTERNAL MEDICINE
Payer: COMMERCIAL

## 2023-03-14 ENCOUNTER — HOSPITAL ENCOUNTER (OUTPATIENT)
Dept: CT IMAGING | Facility: CLINIC | Age: 78
Discharge: HOME | End: 2023-03-14
Attending: INTERNAL MEDICINE
Payer: COMMERCIAL

## 2023-03-14 DIAGNOSIS — C61 MALIGNANT NEOPLASM OF PROSTATE (H): ICD-10-CM

## 2023-03-14 DIAGNOSIS — C61 PROSTATE CANCER (H): ICD-10-CM

## 2023-03-14 DIAGNOSIS — R59.0 ABDOMINAL LYMPHADENOPATHY: ICD-10-CM

## 2023-03-14 PROCEDURE — 250N000011 HC RX IP 250 OP 636: Performed by: INTERNAL MEDICINE

## 2023-03-14 PROCEDURE — 74177 CT ABD & PELVIS W/CONTRAST: CPT

## 2023-03-14 PROCEDURE — 250N000009 HC RX 250: Performed by: INTERNAL MEDICINE

## 2023-03-14 PROCEDURE — 78306 BONE IMAGING WHOLE BODY: CPT

## 2023-03-14 PROCEDURE — 343N000001 HC RX 343: Performed by: INTERNAL MEDICINE

## 2023-03-14 PROCEDURE — A9561 TC99M OXIDRONATE: HCPCS | Performed by: INTERNAL MEDICINE

## 2023-03-14 RX ORDER — IOPAMIDOL 755 MG/ML
500 INJECTION, SOLUTION INTRAVASCULAR ONCE
Status: COMPLETED | OUTPATIENT
Start: 2023-03-14 | End: 2023-03-14

## 2023-03-14 RX ADMIN — SODIUM CHLORIDE 56 ML: 9 INJECTION, SOLUTION INTRAVENOUS at 10:38

## 2023-03-14 RX ADMIN — Medication 26 MILLICURIE: at 10:16

## 2023-03-14 RX ADMIN — IOPAMIDOL 56 ML: 755 INJECTION, SOLUTION INTRAVENOUS at 10:38

## 2023-08-23 ENCOUNTER — HOSPITAL ENCOUNTER (OUTPATIENT)
Dept: CT IMAGING | Facility: CLINIC | Age: 78
Discharge: HOME | End: 2023-08-23
Attending: INTERNAL MEDICINE
Payer: COMMERCIAL

## 2023-08-23 ENCOUNTER — HOSPITAL ENCOUNTER (OUTPATIENT)
Dept: NUCLEAR MEDICINE | Facility: CLINIC | Age: 78
Setting detail: NUCLEAR MEDICINE
Discharge: HOME | End: 2023-08-23
Attending: INTERNAL MEDICINE
Payer: COMMERCIAL

## 2023-08-23 DIAGNOSIS — C79.52 SECONDARY MALIGNANT NEOPLASM OF BONE AND BONE MARROW (H): ICD-10-CM

## 2023-08-23 DIAGNOSIS — C79.51 SECONDARY MALIGNANT NEOPLASM OF BONE AND BONE MARROW (H): ICD-10-CM

## 2023-08-23 DIAGNOSIS — C77.2 SECONDARY AND UNSPECIFIED MALIGNANT NEOPLASM OF INTRA-ABDOMINAL LYMPH NODES (H): ICD-10-CM

## 2023-08-23 DIAGNOSIS — C61 MALIGNANT NEOPLASM OF PROSTATE (H): ICD-10-CM

## 2023-08-23 LAB
CREAT BLD-MCNC: 0.5 MG/DL (ref 0.7–1.3)
GFR SERPL CREATININE-BSD FRML MDRD: >60 ML/MIN/1.73M2

## 2023-08-23 PROCEDURE — A9561 TC99M OXIDRONATE: HCPCS | Performed by: INTERNAL MEDICINE

## 2023-08-23 PROCEDURE — 250N000009 HC RX 250: Performed by: INTERNAL MEDICINE

## 2023-08-23 PROCEDURE — 82565 ASSAY OF CREATININE: CPT

## 2023-08-23 PROCEDURE — 78306 BONE IMAGING WHOLE BODY: CPT

## 2023-08-23 PROCEDURE — 250N000011 HC RX IP 250 OP 636: Performed by: INTERNAL MEDICINE

## 2023-08-23 PROCEDURE — 74177 CT ABD & PELVIS W/CONTRAST: CPT

## 2023-08-23 PROCEDURE — 343N000001 HC RX 343: Performed by: INTERNAL MEDICINE

## 2023-08-23 RX ORDER — IOPAMIDOL 755 MG/ML
500 INJECTION, SOLUTION INTRAVASCULAR ONCE
Status: COMPLETED | OUTPATIENT
Start: 2023-08-23 | End: 2023-08-23

## 2023-08-23 RX ADMIN — Medication 26.1 MILLICURIE: at 11:05

## 2023-08-23 RX ADMIN — IOPAMIDOL 97 ML: 755 INJECTION, SOLUTION INTRAVENOUS at 12:09

## 2023-08-23 RX ADMIN — SODIUM CHLORIDE 53 ML: 9 INJECTION, SOLUTION INTRAVENOUS at 12:09

## 2023-09-03 ENCOUNTER — APPOINTMENT (OUTPATIENT)
Dept: MRI IMAGING | Facility: CLINIC | Age: 78
End: 2023-09-03
Attending: EMERGENCY MEDICINE
Payer: COMMERCIAL

## 2023-09-03 ENCOUNTER — HOSPITAL ENCOUNTER (OUTPATIENT)
Facility: CLINIC | Age: 78
Setting detail: OBSERVATION
Discharge: NURSING, REHAB OR LONG-TERM CARE | End: 2023-09-07
Attending: EMERGENCY MEDICINE | Admitting: HOSPITALIST
Payer: COMMERCIAL

## 2023-09-03 ENCOUNTER — APPOINTMENT (OUTPATIENT)
Dept: GENERAL RADIOLOGY | Facility: CLINIC | Age: 78
End: 2023-09-03
Attending: EMERGENCY MEDICINE
Payer: COMMERCIAL

## 2023-09-03 ENCOUNTER — APPOINTMENT (OUTPATIENT)
Dept: CT IMAGING | Facility: CLINIC | Age: 78
End: 2023-09-03
Attending: EMERGENCY MEDICINE
Payer: COMMERCIAL

## 2023-09-03 DIAGNOSIS — R53.1 GENERALIZED WEAKNESS: ICD-10-CM

## 2023-09-03 DIAGNOSIS — C61 PROSTATE CANCER METASTATIC TO BONE (H): ICD-10-CM

## 2023-09-03 DIAGNOSIS — E11.69 TYPE 2 DIABETES MELLITUS WITH OTHER SPECIFIED COMPLICATION, WITHOUT LONG-TERM CURRENT USE OF INSULIN (H): Primary | ICD-10-CM

## 2023-09-03 DIAGNOSIS — R41.82 ALTERED MENTAL STATUS, UNSPECIFIED ALTERED MENTAL STATUS TYPE: ICD-10-CM

## 2023-09-03 DIAGNOSIS — C79.51 PROSTATE CANCER METASTATIC TO BONE (H): ICD-10-CM

## 2023-09-03 LAB
ALBUMIN SERPL BCG-MCNC: 3.7 G/DL (ref 3.5–5.2)
ALBUMIN UR-MCNC: NEGATIVE MG/DL
ALP SERPL-CCNC: 110 U/L (ref 40–129)
ALT SERPL W P-5'-P-CCNC: 19 U/L (ref 0–70)
ANION GAP SERPL CALCULATED.3IONS-SCNC: 11 MMOL/L (ref 7–15)
APPEARANCE UR: CLEAR
AST SERPL W P-5'-P-CCNC: 19 U/L (ref 0–45)
BASOPHILS # BLD AUTO: 0 10E3/UL (ref 0–0.2)
BASOPHILS NFR BLD AUTO: 1 %
BILIRUB SERPL-MCNC: 0.4 MG/DL
BILIRUB UR QL STRIP: NEGATIVE
BUN SERPL-MCNC: 11.4 MG/DL (ref 8–23)
CALCIUM SERPL-MCNC: 9.4 MG/DL (ref 8.8–10.2)
CHLORIDE SERPL-SCNC: 101 MMOL/L (ref 98–107)
COLOR UR AUTO: ABNORMAL
CREAT SERPL-MCNC: 0.65 MG/DL (ref 0.67–1.17)
DEPRECATED HCO3 PLAS-SCNC: 28 MMOL/L (ref 22–29)
EOSINOPHIL # BLD AUTO: 0.1 10E3/UL (ref 0–0.7)
EOSINOPHIL NFR BLD AUTO: 1 %
ERYTHROCYTE [DISTWIDTH] IN BLOOD BY AUTOMATED COUNT: 13.3 % (ref 10–15)
GFR SERPL CREATININE-BSD FRML MDRD: >90 ML/MIN/1.73M2
GLUCOSE SERPL-MCNC: 260 MG/DL (ref 70–99)
GLUCOSE UR STRIP-MCNC: 1000 MG/DL
HCT VFR BLD AUTO: 35.3 % (ref 40–53)
HGB BLD-MCNC: 12 G/DL (ref 13.3–17.7)
HGB UR QL STRIP: NEGATIVE
HOLD SPECIMEN: NORMAL
IMM GRANULOCYTES # BLD: 0 10E3/UL
IMM GRANULOCYTES NFR BLD: 1 %
KETONES UR STRIP-MCNC: NEGATIVE MG/DL
LACTATE SERPL-SCNC: 1.2 MMOL/L (ref 0.7–2)
LEUKOCYTE ESTERASE UR QL STRIP: NEGATIVE
LYMPHOCYTES # BLD AUTO: 1 10E3/UL (ref 0.8–5.3)
LYMPHOCYTES NFR BLD AUTO: 16 %
MCH RBC QN AUTO: 31.2 PG (ref 26.5–33)
MCHC RBC AUTO-ENTMCNC: 34 G/DL (ref 31.5–36.5)
MCV RBC AUTO: 92 FL (ref 78–100)
MONOCYTES # BLD AUTO: 0.4 10E3/UL (ref 0–1.3)
MONOCYTES NFR BLD AUTO: 6 %
MUCOUS THREADS #/AREA URNS LPF: PRESENT /LPF
NEUTROPHILS # BLD AUTO: 4.7 10E3/UL (ref 1.6–8.3)
NEUTROPHILS NFR BLD AUTO: 75 %
NITRATE UR QL: NEGATIVE
NRBC # BLD AUTO: 0 10E3/UL
NRBC BLD AUTO-RTO: 0 /100
PH UR STRIP: 5 [PH] (ref 5–7)
PLATELET # BLD AUTO: 187 10E3/UL (ref 150–450)
POTASSIUM SERPL-SCNC: 3.5 MMOL/L (ref 3.4–5.3)
PROT SERPL-MCNC: 6.7 G/DL (ref 6.4–8.3)
RBC # BLD AUTO: 3.85 10E6/UL (ref 4.4–5.9)
RBC URINE: <1 /HPF
SODIUM SERPL-SCNC: 140 MMOL/L (ref 136–145)
SP GR UR STRIP: 1.01 (ref 1–1.03)
SQUAMOUS EPITHELIAL: <1 /HPF
UROBILINOGEN UR STRIP-MCNC: NORMAL MG/DL
WBC # BLD AUTO: 6.2 10E3/UL (ref 4–11)
WBC URINE: <1 /HPF

## 2023-09-03 PROCEDURE — 71046 X-RAY EXAM CHEST 2 VIEWS: CPT

## 2023-09-03 PROCEDURE — 96374 THER/PROPH/DIAG INJ IV PUSH: CPT

## 2023-09-03 PROCEDURE — 70450 CT HEAD/BRAIN W/O DYE: CPT

## 2023-09-03 PROCEDURE — 84443 ASSAY THYROID STIM HORMONE: CPT | Performed by: INTERNAL MEDICINE

## 2023-09-03 PROCEDURE — 80053 COMPREHEN METABOLIC PANEL: CPT | Performed by: EMERGENCY MEDICINE

## 2023-09-03 PROCEDURE — A9585 GADOBUTROL INJECTION: HCPCS | Performed by: EMERGENCY MEDICINE

## 2023-09-03 PROCEDURE — 83605 ASSAY OF LACTIC ACID: CPT | Performed by: EMERGENCY MEDICINE

## 2023-09-03 PROCEDURE — 82607 VITAMIN B-12: CPT | Performed by: INTERNAL MEDICINE

## 2023-09-03 PROCEDURE — 36415 COLL VENOUS BLD VENIPUNCTURE: CPT | Performed by: EMERGENCY MEDICINE

## 2023-09-03 PROCEDURE — 250N000011 HC RX IP 250 OP 636: Performed by: EMERGENCY MEDICINE

## 2023-09-03 PROCEDURE — 85025 COMPLETE CBC W/AUTO DIFF WBC: CPT | Performed by: EMERGENCY MEDICINE

## 2023-09-03 PROCEDURE — G0378 HOSPITAL OBSERVATION PER HR: HCPCS

## 2023-09-03 PROCEDURE — 255N000002 HC RX 255 OP 636: Performed by: EMERGENCY MEDICINE

## 2023-09-03 PROCEDURE — 83036 HEMOGLOBIN GLYCOSYLATED A1C: CPT | Performed by: INTERNAL MEDICINE

## 2023-09-03 PROCEDURE — 99285 EMERGENCY DEPT VISIT HI MDM: CPT | Mod: 25

## 2023-09-03 PROCEDURE — 70553 MRI BRAIN STEM W/O & W/DYE: CPT

## 2023-09-03 PROCEDURE — 99222 1ST HOSP IP/OBS MODERATE 55: CPT

## 2023-09-03 PROCEDURE — 84153 ASSAY OF PSA TOTAL: CPT | Performed by: INTERNAL MEDICINE

## 2023-09-03 PROCEDURE — 258N000003 HC RX IP 258 OP 636: Performed by: EMERGENCY MEDICINE

## 2023-09-03 PROCEDURE — 72157 MRI CHEST SPINE W/O & W/DYE: CPT

## 2023-09-03 PROCEDURE — 93005 ELECTROCARDIOGRAM TRACING: CPT

## 2023-09-03 PROCEDURE — 81001 URINALYSIS AUTO W/SCOPE: CPT | Performed by: EMERGENCY MEDICINE

## 2023-09-03 PROCEDURE — 72158 MRI LUMBAR SPINE W/O & W/DYE: CPT

## 2023-09-03 RX ORDER — OXYCODONE HYDROCHLORIDE 5 MG/1
5 TABLET ORAL EVERY 4 HOURS PRN
Status: ON HOLD | COMMUNITY
End: 2023-09-06

## 2023-09-03 RX ORDER — DULOXETIN HYDROCHLORIDE 20 MG/1
20 CAPSULE, DELAYED RELEASE ORAL 2 TIMES DAILY
COMMUNITY

## 2023-09-03 RX ORDER — ROSUVASTATIN CALCIUM 20 MG/1
20 TABLET, COATED ORAL DAILY
COMMUNITY
End: 2024-01-01

## 2023-09-03 RX ORDER — NALOXONE HYDROCHLORIDE 0.4 MG/ML
0.4 INJECTION, SOLUTION INTRAMUSCULAR; INTRAVENOUS; SUBCUTANEOUS
Status: DISCONTINUED | OUTPATIENT
Start: 2023-09-03 | End: 2023-09-07 | Stop reason: HOSPADM

## 2023-09-03 RX ORDER — SERTRALINE HYDROCHLORIDE 100 MG/1
100 TABLET, FILM COATED ORAL DAILY
Status: DISCONTINUED | OUTPATIENT
Start: 2023-09-04 | End: 2023-09-07 | Stop reason: HOSPADM

## 2023-09-03 RX ORDER — ACETAMINOPHEN 325 MG/1
650 TABLET ORAL EVERY 6 HOURS PRN
Status: DISCONTINUED | OUTPATIENT
Start: 2023-09-03 | End: 2023-09-07 | Stop reason: HOSPADM

## 2023-09-03 RX ORDER — ACETAMINOPHEN 650 MG/1
650 SUPPOSITORY RECTAL EVERY 6 HOURS PRN
Status: DISCONTINUED | OUTPATIENT
Start: 2023-09-03 | End: 2023-09-07 | Stop reason: HOSPADM

## 2023-09-03 RX ORDER — LORAZEPAM 1 MG/1
1 TABLET ORAL DAILY PRN
Status: DISCONTINUED | OUTPATIENT
Start: 2023-09-03 | End: 2023-09-07 | Stop reason: HOSPADM

## 2023-09-03 RX ORDER — HYDROMORPHONE HCL IN WATER/PF 6 MG/30 ML
0.2 PATIENT CONTROLLED ANALGESIA SYRINGE INTRAVENOUS
Status: DISCONTINUED | OUTPATIENT
Start: 2023-09-03 | End: 2023-09-07 | Stop reason: HOSPADM

## 2023-09-03 RX ORDER — PREDNISONE 5 MG/1
5 TABLET ORAL 2 TIMES DAILY
Status: DISCONTINUED | OUTPATIENT
Start: 2023-09-03 | End: 2023-09-05

## 2023-09-03 RX ORDER — LORAZEPAM 2 MG/ML
0.5 INJECTION INTRAMUSCULAR ONCE
Status: COMPLETED | OUTPATIENT
Start: 2023-09-03 | End: 2023-09-03

## 2023-09-03 RX ORDER — SERTRALINE HYDROCHLORIDE 100 MG/1
100 TABLET, FILM COATED ORAL EVERY MORNING
COMMUNITY

## 2023-09-03 RX ORDER — OXYCODONE HYDROCHLORIDE 5 MG/1
5 TABLET ORAL EVERY 4 HOURS PRN
Status: DISCONTINUED | OUTPATIENT
Start: 2023-09-03 | End: 2023-09-07 | Stop reason: HOSPADM

## 2023-09-03 RX ORDER — METFORMIN HCL 500 MG
1000 TABLET, EXTENDED RELEASE 24 HR ORAL
Status: DISCONTINUED | OUTPATIENT
Start: 2023-09-04 | End: 2023-09-07 | Stop reason: HOSPADM

## 2023-09-03 RX ORDER — NALOXONE HYDROCHLORIDE 0.4 MG/ML
0.2 INJECTION, SOLUTION INTRAMUSCULAR; INTRAVENOUS; SUBCUTANEOUS
Status: DISCONTINUED | OUTPATIENT
Start: 2023-09-03 | End: 2023-09-07 | Stop reason: HOSPADM

## 2023-09-03 RX ORDER — ONDANSETRON 2 MG/ML
4 INJECTION INTRAMUSCULAR; INTRAVENOUS EVERY 6 HOURS PRN
Status: DISCONTINUED | OUTPATIENT
Start: 2023-09-03 | End: 2023-09-07 | Stop reason: HOSPADM

## 2023-09-03 RX ORDER — LORAZEPAM 1 MG/1
1 TABLET ORAL DAILY PRN
Status: ON HOLD | COMMUNITY
End: 2023-09-06

## 2023-09-03 RX ORDER — AMLODIPINE BESYLATE 5 MG/1
5 TABLET ORAL DAILY
Status: DISCONTINUED | OUTPATIENT
Start: 2023-09-04 | End: 2023-09-07 | Stop reason: HOSPADM

## 2023-09-03 RX ORDER — DULOXETIN HYDROCHLORIDE 20 MG/1
20 CAPSULE, DELAYED RELEASE ORAL 2 TIMES DAILY
Status: DISCONTINUED | OUTPATIENT
Start: 2023-09-03 | End: 2023-09-07 | Stop reason: HOSPADM

## 2023-09-03 RX ORDER — HYDROMORPHONE HCL IN WATER/PF 6 MG/30 ML
0.4 PATIENT CONTROLLED ANALGESIA SYRINGE INTRAVENOUS
Status: DISCONTINUED | OUTPATIENT
Start: 2023-09-03 | End: 2023-09-07 | Stop reason: HOSPADM

## 2023-09-03 RX ORDER — METFORMIN HCL 500 MG
1000 TABLET, EXTENDED RELEASE 24 HR ORAL
COMMUNITY
End: 2024-01-01

## 2023-09-03 RX ORDER — GADOBUTROL 604.72 MG/ML
7.5 INJECTION INTRAVENOUS ONCE
Status: COMPLETED | OUTPATIENT
Start: 2023-09-03 | End: 2023-09-03

## 2023-09-03 RX ORDER — AMOXICILLIN 250 MG
1 CAPSULE ORAL 2 TIMES DAILY PRN
Status: DISCONTINUED | OUTPATIENT
Start: 2023-09-03 | End: 2023-09-07 | Stop reason: HOSPADM

## 2023-09-03 RX ORDER — ONDANSETRON 4 MG/1
4 TABLET, ORALLY DISINTEGRATING ORAL EVERY 6 HOURS PRN
Status: DISCONTINUED | OUTPATIENT
Start: 2023-09-03 | End: 2023-09-07 | Stop reason: HOSPADM

## 2023-09-03 RX ORDER — LIDOCAINE 40 MG/G
CREAM TOPICAL
Status: DISCONTINUED | OUTPATIENT
Start: 2023-09-03 | End: 2023-09-07 | Stop reason: HOSPADM

## 2023-09-03 RX ORDER — AMOXICILLIN 250 MG
2 CAPSULE ORAL 2 TIMES DAILY PRN
Status: DISCONTINUED | OUTPATIENT
Start: 2023-09-03 | End: 2023-09-07 | Stop reason: HOSPADM

## 2023-09-03 RX ORDER — PREDNISONE 5 MG/1
5 TABLET ORAL 2 TIMES DAILY
Status: ON HOLD | COMMUNITY
End: 2023-09-06

## 2023-09-03 RX ADMIN — GADOBUTROL 7.5 ML: 604.72 INJECTION INTRAVENOUS at 19:16

## 2023-09-03 RX ADMIN — SODIUM CHLORIDE 1000 ML: 9 INJECTION, SOLUTION INTRAVENOUS at 18:17

## 2023-09-03 RX ADMIN — LORAZEPAM 0.5 MG: 2 INJECTION INTRAMUSCULAR; INTRAVENOUS at 19:08

## 2023-09-03 ASSESSMENT — ACTIVITIES OF DAILY LIVING (ADL)
ADLS_ACUITY_SCORE: 35

## 2023-09-03 NOTE — PROGRESS NOTES
History     Chief Complaint:  Fatigue       HPI   Migue Beach is a 77 year old male who presents with generalized weakness.  He has a history of prostate cancer.  Patient had recent oncology follow-up CT scan that showed new retroperitoneal metastasis and nuclear medicine bone scan that showed extensive bony metastasis including chronic abscesses.  Over the past 2 weeks, patient has had significant progressive decline in function.  He has had 2 falls at home.  He did hit his head but these.  Patient's family endorses can fusion.  Things are much worse over the past 5 days.  Patient is having much more difficulty walking and feels that his legs are very very weak.  He is unable to ambulate even with a walker now at home.  No fever cough congestion.    Allergies:  No Known Allergies     Medications:    amLODIPine (NORVASC) 5 MG tablet  ASPIRIN PO  dutasteride (AVODART) 0.5 MG capsule  ESCITALOPRAM OXALATE PO  HYDROcodone-acetaminophen (NORCO)  MG per tablet  LORAZEPAM PO  predniSONE (DELTASONE) 20 MG tablet  SIMVASTATIN PO  TAMSULOSIN HCL PO        Past Medical History:    Past Medical History:   Diagnosis Date    Depressive disorder     Hypertension        Past Surgical History:    Past Surgical History:   Procedure Laterality Date    COLONOSCOPY          Family History:    family history is not on file.    Social History:   reports that he has quit smoking. He does not have any smokeless tobacco history on file. He reports current alcohol use. He reports that he does not use drugs.  PCP: Kalpesh Dey     Physical Exam   Patient Vitals for the past 24 hrs:   BP Temp Temp src Pulse Resp SpO2   09/03/23 1715 97/54 -- -- 83 -- --   09/03/23 1701 109/74 98.1  F (36.7  C) Oral 83 18 94 %        Physical Exam    Gen: alert, confusion  HEENT: no acute abnl  Neck: normal ROM  CV: RRR, no murmurs  Pulm: breath sounds equal, lungs clear  Abd: Soft, nontender  Back: no evidence of injury, no cva tenderness  MSK: no  deformity, moves all extremities  Skin: no rash  Neuro: alert, repetative questions, answers direct questions but cannot give full detail, diffuse BLE weakness- cannot lift the legs off the bed, weakness in plantar and dorsiflexion of the ankles      Emergency Department Course   ECG:  ECG results from 03/01/23   EKG 12 lead     Value    Systolic Blood Pressure     Diastolic Blood Pressure     Ventricular Rate 118    Atrial Rate 118    TN Interval 166    QRS Duration 80        QTc 462    P Axis 26    R AXIS 25    T Axis 35    Interpretation ECG      Sinus tachycardia  Low voltage QRS  Borderline ECG  When compared with ECG of 21-JUN-2017 15:05,  No significant change was found  Confirmed by - EMERGENCY ROOM, PHYSICIAN ([id number]),  PATRICIA CARTAGENA ([id number]) on 3/2/2023 6:35:53 AM         Imaging:  Head CT w/o contrast    (Results Pending)   Chest XR,  PA & LAT    (Results Pending)   MR Lumbar Spine w/o & w Contrast    (Results Pending)   MR Thoracic Spine w/o & w Contrast    (Results Pending)          Laboratory:  Labs Ordered and Resulted from Time of ED Arrival to Time of ED Departure   CBC WITH PLATELETS AND DIFFERENTIAL - Abnormal       Result Value    WBC Count 6.2      RBC Count 3.85 (*)     Hemoglobin 12.0 (*)     Hematocrit 35.3 (*)     MCV 92      MCH 31.2      MCHC 34.0      RDW 13.3      Platelet Count 187      % Neutrophils 75      % Lymphocytes 16      % Monocytes 6      % Eosinophils 1      % Basophils 1      % Immature Granulocytes 1      NRBCs per 100 WBC 0      Absolute Neutrophils 4.7      Absolute Lymphocytes 1.0      Absolute Monocytes 0.4      Absolute Eosinophils 0.1      Absolute Basophils 0.0      Absolute Immature Granulocytes 0.0      Absolute NRBCs 0.0     ROUTINE UA WITH MICROSCOPIC REFLEX TO CULTURE   COMPREHENSIVE METABOLIC PANEL   LACTIC ACID WHOLE BLOOD             Interventions:  Normal Saline 1000 mL  Ativan 0.5mg IV    Impression & Plan      Medical Decision Making:  Patient  presents for severe generalized weakness, confusion and severe lower extremity weakness with gait instability in setting of known progressive metastatic prostate cancer.  No signs of infection here.  Chest x-ray negative.  Urine clean.  No overt infectious symptoms.  Abdominal exam is nontender.  Patient with recent falls.  CT head negative for intracranial hemorrhage or skull fracture.  MRI brain negative for metastatic lesion, hydrocephalus or other acute process.  MRI of thoracic and lumbar spine showed extensive bony metastasis however no spinal cord lesion or compression.  No signs of cauda equina.    Patient did also start oxycodone recently for as needed for pain control.  This through his oncologist.  This may be contributing to his symptoms.    Patient did receive 1 L IV fluids here and seems to be improved from a mental status standpoint after receiving this.  Possibly component subclinical dehydration.    Nonetheless, patient is quite weak and unable to ambulate with ongoing confusion.  He will require admission to the hospital for ongoing monitoring, oncology consultation further care.  I did discuss with patient and his family that he may benefit from palliative care consultation as well.      Diagnosis:    ICD-10-CM    1. Prostate cancer metastatic to bone (H)  C61     C79.51       2. Altered mental status, unspecified altered mental status type  R41.82       3. Generalized weakness  R53.1          Kendy Brasher MD

## 2023-09-03 NOTE — ED TRIAGE NOTES
BIBA Patient has had increasing weakness and confusion over the past week, independent and ambulatory a week ago and now needing 2 assist. Frequen t falls over the week.  for EMS. Hx prostate cancer     Triage Assessment       Row Name 09/03/23 1703       Triage Assessment (Adult)    Airway WDL WDL       Respiratory WDL    Respiratory WDL WDL       Skin Circulation/Temperature WDL    Skin Circulation/Temperature WDL WDL       Cardiac WDL    Cardiac WDL WDL       Peripheral/Neurovascular WDL    Peripheral Neurovascular WDL WDL       Cognitive/Neuro/Behavioral WDL    Cognitive/Neuro/Behavioral WDL X;orientation    Level of Consciousness confused    Orientation time;place

## 2023-09-03 NOTE — ED NOTES
Bed: ED22  Expected date: 9/3/23  Expected time: 4:40 PM  Means of arrival:   Comments:  iNcole 541- weakness 78 yo

## 2023-09-04 ENCOUNTER — APPOINTMENT (OUTPATIENT)
Dept: PHYSICAL THERAPY | Facility: CLINIC | Age: 78
End: 2023-09-04
Payer: COMMERCIAL

## 2023-09-04 ENCOUNTER — APPOINTMENT (OUTPATIENT)
Dept: CT IMAGING | Facility: CLINIC | Age: 78
End: 2023-09-04
Attending: PHYSICIAN ASSISTANT
Payer: COMMERCIAL

## 2023-09-04 LAB
GLUCOSE BLDC GLUCOMTR-MCNC: 229 MG/DL (ref 70–99)
GLUCOSE BLDC GLUCOMTR-MCNC: 240 MG/DL (ref 70–99)
GLUCOSE BLDC GLUCOMTR-MCNC: 254 MG/DL (ref 70–99)
HBA1C MFR BLD: 8.4 %
PSA SERPL DL<=0.01 NG/ML-MCNC: 28.3 NG/ML (ref 0–6.5)
TSH SERPL DL<=0.005 MIU/L-ACNC: 1.16 UIU/ML (ref 0.3–4.2)
VIT B12 SERPL-MCNC: 427 PG/ML (ref 232–1245)

## 2023-09-04 PROCEDURE — 250N000012 HC RX MED GY IP 250 OP 636 PS 637: Performed by: PHYSICIAN ASSISTANT

## 2023-09-04 PROCEDURE — 250N000011 HC RX IP 250 OP 636: Mod: JZ | Performed by: PHYSICIAN ASSISTANT

## 2023-09-04 PROCEDURE — 97530 THERAPEUTIC ACTIVITIES: CPT | Mod: GP | Performed by: PHYSICAL THERAPIST

## 2023-09-04 PROCEDURE — 82962 GLUCOSE BLOOD TEST: CPT

## 2023-09-04 PROCEDURE — 250N000013 HC RX MED GY IP 250 OP 250 PS 637

## 2023-09-04 PROCEDURE — 71250 CT THORAX DX C-: CPT

## 2023-09-04 PROCEDURE — 250N000012 HC RX MED GY IP 250 OP 636 PS 637

## 2023-09-04 PROCEDURE — 99232 SBSQ HOSP IP/OBS MODERATE 35: CPT | Performed by: PHYSICIAN ASSISTANT

## 2023-09-04 PROCEDURE — 96372 THER/PROPH/DIAG INJ SC/IM: CPT | Performed by: PHYSICIAN ASSISTANT

## 2023-09-04 PROCEDURE — 97161 PT EVAL LOW COMPLEX 20 MIN: CPT | Mod: GP | Performed by: PHYSICAL THERAPIST

## 2023-09-04 PROCEDURE — G0378 HOSPITAL OBSERVATION PER HR: HCPCS

## 2023-09-04 RX ORDER — ENOXAPARIN SODIUM 100 MG/ML
40 INJECTION SUBCUTANEOUS EVERY 24 HOURS
Status: DISCONTINUED | OUTPATIENT
Start: 2023-09-04 | End: 2023-09-07 | Stop reason: HOSPADM

## 2023-09-04 RX ORDER — DEXTROSE MONOHYDRATE 25 G/50ML
25-50 INJECTION, SOLUTION INTRAVENOUS
Status: DISCONTINUED | OUTPATIENT
Start: 2023-09-04 | End: 2023-09-07 | Stop reason: HOSPADM

## 2023-09-04 RX ORDER — NICOTINE POLACRILEX 4 MG
15-30 LOZENGE BUCCAL
Status: DISCONTINUED | OUTPATIENT
Start: 2023-09-04 | End: 2023-09-07 | Stop reason: HOSPADM

## 2023-09-04 RX ADMIN — METFORMIN HYDROCHLORIDE 1000 MG: 500 TABLET, EXTENDED RELEASE ORAL at 17:21

## 2023-09-04 RX ADMIN — OXYCODONE HYDROCHLORIDE 5 MG: 5 TABLET ORAL at 03:32

## 2023-09-04 RX ADMIN — Medication 1 MG: at 20:30

## 2023-09-04 RX ADMIN — DULOXETINE HYDROCHLORIDE 20 MG: 20 CAPSULE, DELAYED RELEASE ORAL at 09:19

## 2023-09-04 RX ADMIN — DULOXETINE HYDROCHLORIDE 20 MG: 20 CAPSULE, DELAYED RELEASE ORAL at 00:20

## 2023-09-04 RX ADMIN — PREDNISONE 5 MG: 5 TABLET ORAL at 20:30

## 2023-09-04 RX ADMIN — OXYCODONE HYDROCHLORIDE 5 MG: 5 TABLET ORAL at 17:21

## 2023-09-04 RX ADMIN — PREDNISONE 5 MG: 5 TABLET ORAL at 00:20

## 2023-09-04 RX ADMIN — SERTRALINE HYDROCHLORIDE 100 MG: 100 TABLET ORAL at 09:19

## 2023-09-04 RX ADMIN — ENOXAPARIN SODIUM 40 MG: 40 INJECTION SUBCUTANEOUS at 15:49

## 2023-09-04 RX ADMIN — DULOXETINE HYDROCHLORIDE 20 MG: 20 CAPSULE, DELAYED RELEASE ORAL at 20:30

## 2023-09-04 RX ADMIN — PREDNISONE 5 MG: 5 TABLET ORAL at 09:19

## 2023-09-04 RX ADMIN — OXYCODONE HYDROCHLORIDE 5 MG: 5 TABLET ORAL at 09:29

## 2023-09-04 RX ADMIN — AMLODIPINE BESYLATE 5 MG: 5 TABLET ORAL at 09:19

## 2023-09-04 RX ADMIN — INSULIN ASPART 2 UNITS: 100 INJECTION, SOLUTION INTRAVENOUS; SUBCUTANEOUS at 17:35

## 2023-09-04 ASSESSMENT — ACTIVITIES OF DAILY LIVING (ADL)
ADLS_ACUITY_SCORE: 31
ADLS_ACUITY_SCORE: 32
ADLS_ACUITY_SCORE: 23
DEPENDENT_IADLS:: TRANSPORTATION
ADLS_ACUITY_SCORE: 27
ADLS_ACUITY_SCORE: 23
ADLS_ACUITY_SCORE: 32

## 2023-09-04 NOTE — PHARMACY-ADMISSION MEDICATION HISTORY
Pharmacist Admission Medication History    Admission medication history is complete. The information provided in this note is only as accurate as the sources available at the time of the update.    Medication reconciliation/reorder completed by provider prior to medication history? No    Information Source(s): Patient, Family member, Prescription bottles, and CareEverywhere/SureScripts via in-person    Pertinent Information: Patient's oncologist just stopped abiraterone on Wednesday.     Changes made to PTA medication list:  Added: Duloxetine, sertraline, benadryl cream, metformin, rosuvastatin, and oxycodone  Deleted: Baby aspirin, dutasteride, lexapro, norco, simvastatin, and tamsulosin  Changed: Prednisone 40 mg x 5 days to 5 mg bid         Allergies reviewed with patient and updates made in EHR: yes    Medication History Completed By: Pako Isaacs RPH 9/3/2023 6:59 PM    Prior to Admission medications    Medication Sig Last Dose Taking? Auth Provider Long Term End Date   amLODIPine (NORVASC) 5 MG tablet Take 1 tablet (5 mg) by mouth daily 9/3/2023 at am Yes Nate Ordonez, DO Yes    diphenhydrAMINE-zinc acetate (BENADRYL) 1-0.1 % external cream Apply topically 3 times daily as needed for itching Unknown at prn Yes Unknown, Entered By History     DULoxetine (CYMBALTA) 20 MG capsule Take 20 mg by mouth 2 times daily 9/3/2023 at am Yes Unknown, Entered By History Yes    LORazepam (ATIVAN) 1 MG tablet Take 1 mg by mouth daily as needed for anxiety Unknown at prn Yes Unknown, Entered By History     metFORMIN (GLUCOPHAGE XR) 500 MG 24 hr tablet Take 1,000 mg by mouth daily (with dinner) 9/2/2023 at pm Yes Unknown, Entered By History Yes    oxyCODONE (ROXICODONE) 5 MG tablet Take 5 mg by mouth every 4 hours as needed for severe pain Unknown at prn Yes Unknown, Entered By History     predniSONE (DELTASONE) 5 MG tablet Take 5 mg by mouth 2 times daily 9/3/2023 at am Yes Unknown, Entered By History      rosuvastatin (CRESTOR) 20 MG tablet Take 20 mg by mouth daily 9/3/2023 at am Yes Unknown, Entered By History Yes    sertraline (ZOLOFT) 100 MG tablet Take 100 mg by mouth daily 9/3/2023 at am Yes Unknown, Entered By History Yes

## 2023-09-04 NOTE — PLAN OF CARE
PRIMARY DIAGNOSIS: GENERALIZED WEAKNESS    OUTPATIENT/OBSERVATION GOALS TO BE MET BEFORE DISCHARGE  1. Orthostatic performed: N/A    2. Tolerating PO medications: Yes    3. Return to near baseline physical activity: No    4. Cleared for discharge by consultants (if involved): No    Discharge Planner Nurse   Safe discharge environment identified: No  Barriers to discharge: Yes       Entered by: Linda Byers RN 09/04/2023      Please review provider order for any additional goals.   Nurse to notify provider when observation goals have been met and patient is ready for discharge.    Pt A&O to self, pleasantly confused. IV SL. Assist x2 with gait belt in room. Tolerates Po medication well, PRN oxy given for pain. On fall precautions. Plan for PT and Hem/Onc following. Pt resting in bed, call light with in reach. Able to make needs known. Family at bedside.

## 2023-09-04 NOTE — PLAN OF CARE
"PRIMARY DIAGNOSIS: GENERALIZED WEAKNESS    OUTPATIENT/OBSERVATION GOALS TO BE MET BEFORE DISCHARGE  1. Orthostatic performed: No    2. Tolerating PO medications: Yes    3. Return to near baseline physical activity: No    4. Cleared for discharge by consultants (if involved): No    Discharge Planner Nurse   Safe discharge environment identified: No  Barriers to discharge: Yes       Entered by: Ashley Orr RN 09/04/2023   BP (!) 165/93 (BP Location: Right arm)   Pulse 90   Temp 96.8  F (36  C) (Axillary)   Resp 15   Ht 1.702 m (5' 7.01\")   Wt 79 kg (174 lb 2.6 oz)   SpO2 97%   BMI 27.27 kg/m     Patient alert & oriented with some confusion. VSS.Oral pain meds administered for lower back pain. Assist x 2 with transfers. Appears too weak to take a step.External male catheter in place. PIV saline locked.SW,PT,Hem/Onc consult.Will continue with plan of care.     Please review provider order for any additional goals.   Nurse to notify provider when observation goals have been met and patient is ready for discharge.  "

## 2023-09-04 NOTE — PLAN OF CARE
PRIMARY DIAGNOSIS: GENERALIZED WEAKNESS    OUTPATIENT/OBSERVATION GOALS TO BE MET BEFORE DISCHARGE  1. Orthostatic performed: N/A    2. Tolerating PO medications: Yes    3. Return to near baseline physical activity: No    4. Cleared for discharge by consultants (if involved): No    Discharge Planner Nurse   Safe discharge environment identified: No  Barriers to discharge: Yes       Entered by: Linda Byers RN 09/04/2023      Please review provider order for any additional goals.   Nurse to notify provider when observation goals have been met and patient is ready for discharge.    Pt A&O to self, pleasantly confused. IV SL. Assist x2 with gait belt in room. Tolerates Po medication well, PRN oxy given for pain. On fall precautions. PT and Hem/Onc following. Pt resting in bed, call light with in reach. Able to make needs known. Family at bedside.

## 2023-09-04 NOTE — H&P
[facility]    History and Physical - Hospitalist Service       Date of Admission:  9/3/2023    Assessment & Plan      Migue Beach is a 77 year old male with PMH metastatic prostate cancer who presents with generalized weakness.    Majority of the history was obtained from the patient's son due to patient's confusion.  Over the last month patient has had increased sleepiness and generalized weakness.  Weakness has significantly worsened since 8/30 resulting in several falls at home and requiring 2 assist.  Family has also noted increased confusion that seems worse over the past couple days.  Poor oral intake due to increased sleepiness.  Patient has been taking oxycodone for approximately 6 months due to low back pain and has increased the dose the last couple days but typically only takes a half a tablet.  Was diagnosed with prostate cancer approximately 6 to 7 years ago, underwent radiation and was stable for several years.  In the last year prostate cancer markers had been increasing and patient has been trialing different treatment options. Recently seen a couple weeks ago at [place] [facility] for follow-up on his prostate cancer and learned about the metastatic prostate diagnosis.  Plan was to begin chemotherapy on 9/8/23.  Denies any fevers, chills, chest pain, shortness of breath, new cough, abdominal pain, nausea/vomiting. No recent sick contacts.    In the ED, afebrile, slightly hypertensive 157/90, heart rate 95, respirations 18, oxygen 97% on room air.  CMP is notable for creatinine of 0.65, glucose 260.  Lactic acid 1.2.  CBC notable for hemoglobin of 12.  UA unremarkable.  CT head shows no evidence of acute intracranial process, presumed chronic microvascular ischemic changes noted.  MRI of the brain shows no acute intracranial abnormality or metastasis, numerous osseous metastasis with patchy meningeal enhancement underlying the left temporal calvarial lesion, this could represent  reactive change versus contiguous patchy meningeal spread of disease. Chest x-ray shows no evidence of acute cardiopulmonary disease.  Thoracic and lumbar MRI shows widespread osseous metastasis without canal compromise or pathologic fracture, 5 mm intradural extramedullary lesion at T9-T10, left supraclavicular and retroperitoneal metastasis adenopathy.    Generalized weakness  Mechanical falls  Confusion  Underwent a fairly significant work-up in the emergency department that was unrevealing.  No obvious infectious etiology.  CT head and MRI imaging negative for acute findings.  Possibility that his oxycodone at home could be contributing to his increased confusion and weakness.  However I am highly suspicious that his symptoms are related to disease progression of his metastatic prostate cancer.  Plan:   - [facility] consult: son would like to discuss prognosis, about pursuing chemo on Friday, etc, as he was not at the visit when the patient learn about the metastatic disease  - Consult palliative care   - PT consult   - SW consult     Metastatic prostate cancer  Primary oncologist Dr. Chris Damon at [place] [facility]. Unable to find notes in chart about care discussion and treatment plan. Tentative plan is to start chemo on 9/8 per son.     HTN  HLD  - continue pta amlodipine  - hold statin while OBS    Anxiety   - continue pta ativan PRN, zoloft and cymbalta     **unclear why patient is taking prednisone, not a lot of health history in care everywhere or chart.     Diet:  Regular diet  DVT Prophylaxis: Ambulate every shift  Gorman Catheter: Not present  Lines: None     Cardiac Monitoring: None  Code Status:  Full code, discuss with family after oncology consult     Disposition Plan pending oncology consult and goals of care     The patient's care was discussed with the Patient and Patient's Family.    SARAHI Ohara PA-C  Hospitalist Service  [facility]  Securely message with [employer]  info)  Text page via [facility] Paging/Directory     ______________________________________________________________________    Chief Complaint   weakness    History is obtained from the patient's son    History of Present Illness   Migue Beach is a 77 year old male with PMH metastatic prostate cancer who presents with generalized weakness.    Majority of the history was obtained from the patient's son due to patient's confusion.  Over the last month patient has had increased sleepiness and generalized weakness.  Weakness has significantly worsened since 8/30 resulting in several falls at home and requiring 2 assist.  Family has also noted increased confusion that seems worse over the past couple days.  Poor oral intake due to increased sleepiness.  Patient has been taking oxycodone for approximately 6 months due to low back pain and has increased the dose the last couple days but typically only takes a half a tablet.  Was diagnosed with prostate cancer approximately 6 to 7 years ago, underwent radiation and was stable for several years.  In the last year prostate cancer markers had been increasing and patient has been trialing different treatment options. Recently seen a couple weeks ago at [place] [facility] for follow-up on his prostate cancer and learned about the metastatic prostate diagnosis.  Plan was to begin chemotherapy on 9/8/23.  Denies any fevers, chills, chest pain, shortness of breath, new cough, abdominal pain, nausea/vomiting. No recent sick contacts.    In the ED, afebrile, slightly hypertensive 157/90, heart rate 95, respirations 18, oxygen 97% on room air.  CMP is notable for creatinine of 0.65, glucose 260.  Lactic acid 1.2.  CBC notable for hemoglobin of 12.  UA unremarkable.  CT head shows no evidence of acute intracranial process, presumed chronic microvascular ischemic changes noted.  MRI of the brain shows no acute intracranial abnormality or metastasis, numerous osseous metastasis with patchy  meningeal enhancement underlying the left temporal calvarial lesion, this could represent reactive change versus contiguous patchy meningeal spread of disease. Chest x-ray shows no evidence of acute cardiopulmonary disease.  Thoracic and lumbar MRI shows widespread osseous metastasis without canal compromise or pathologic fracture, 5 mm intradural extramedullary lesion at T9-T10, left supraclavicular and retroperitoneal metastasis adenopathy.    Past Medical History    Past Medical History:   Diagnosis Date    Depressive disorder     anxiety    Hypertension      Past Surgical History   Past Surgical History:   Procedure Laterality Date    COLONOSCOPY       Prior to Admission Medications   Prior to Admission Medications   Prescriptions Last Dose Informant Patient Reported? Taking?   DULoxetine (CYMBALTA) 20 MG capsule 9/3/2023 at am Self Yes Yes   Sig: Take 20 mg by mouth 2 times daily   LORazepam (ATIVAN) 1 MG tablet Unknown at prn Self Yes Yes   Sig: Take 1 mg by mouth daily as needed for anxiety   amLODIPine (NORVASC) 5 MG tablet 9/3/2023 at am Self No Yes   Sig: Take 1 tablet (5 mg) by mouth daily   diphenhydrAMINE-zinc acetate (BENADRYL) 1-0.1 % external cream Unknown at prn Self Yes Yes   Sig: Apply topically 3 times daily as needed for itching   metFORMIN (GLUCOPHAGE XR) 500 MG 24 hr tablet 9/2/2023 at pm Self Yes Yes   Sig: Take 1,000 mg by mouth daily (with dinner)   oxyCODONE (ROXICODONE) 5 MG tablet Unknown at prn Self Yes Yes   Sig: Take 5 mg by mouth every 4 hours as needed for severe pain   predniSONE (DELTASONE) 5 MG tablet 9/3/2023 at am Self Yes Yes   Sig: Take 5 mg by mouth 2 times daily   rosuvastatin (CRESTOR) 20 MG tablet 9/3/2023 at am  Yes Yes   Sig: Take 20 mg by mouth daily   sertraline (ZOLOFT) 100 MG tablet 9/3/2023 at am Self Yes Yes   Sig: Take 100 mg by mouth daily      Facility-Administered Medications: None        Social History   I have reviewed this patient's social history and  updated it with pertinent information if needed.  Social History     Tobacco Use    Smoking status: Former   Substance Use Topics    Alcohol use: Yes     Comment: 5- weekend    Drug use: No     Allergies   No Known Allergies     Physical Exam   Vital Signs: Temp: 98.1  F (36.7  C) Temp src: Oral BP: (!) 157/90 Pulse: 89   Resp: 18 SpO2: 97 % O2 Device: None (Room air)    Weight: 0 lbs 0 oz    GENERAL:  Alert, Comfortable, No acute distress. Laying in bed.   PSYCH: pleasant, confused  HEENT:  Normocephalic,  No scleral icterus or conjunctival injection,   HEART:  Normal S1, S2 with no murmur, RRR  LUNGS:  Normal Respiratory effort. Clear to auscultation bilaterally with no wheezing, rales or ronchi.  ABDOMEN:  Soft, non-tender, non distended. No peritoneal signs.   EXTREMITIES:  No pedal edema, No cyanosis.   SKIN:  Warm, dry to touch. No rash  NEUROLOGIC: Speech clear, alert but confused, no focal deficits.     Medical Decision Making       MANAGEMENT DISCUSSED with the following over the past 24 hours: patient, ED provider,   NOTE(S)/MEDICAL RECORDS REVIEWED over the past 24 hours: labs, imaging, progress note       Data     I have personally reviewed the following data over the past 24 hrs:    6.2  \   12.0 (L)   / 187     140 101 11.4 /  260 (H)   3.5 28 0.65 (L) \     ALT: 19 AST: 19 AP: 110 TBILI: 0.4   ALB: 3.7 TOT PROTEIN: 6.7 LIPASE: N/A     Procal: N/A CRP: N/A Lactic Acid: 1.2         Imaging results reviewed over the past 24 hrs:   Recent Results (from the past 24 hour(s))   Head CT w/o contrast    Narrative    EXAM: CT HEAD W/O CONTRAST  LOCATION: [facility]  DATE: 9/3/2023    INDICATION: fall, AMS, hx of prostate cancer  COMPARISON: 03/01/2023  TECHNIQUE: Routine CT Head without IV contrast. Multiplanar reformats. Dose reduction techniques were used.    FINDINGS:  INTRACRANIAL CONTENTS: No intracranial hemorrhage, extraaxial collection, or mass effect.  No CT evidence of acute  infarct. Mild presumed chronic small vessel ischemic changes. Intracranial atherosclerosis is present. Mild generalized volume loss. No   hydrocephalus.     VISUALIZED ORBITS/SINUSES/MASTOIDS: No intraorbital abnormality. No paranasal sinus mucosal disease. No middle ear or mastoid effusion.    BONES/SOFT TISSUES: No scalp hematoma. No skull fracture.      Impression    IMPRESSION:  1.  No CT evidence for acute intracranial process.  2.  Brain atrophy and presumed chronic microvascular ischemic changes as above.   Chest XR,  PA & LAT    Narrative    EXAM: XR CHEST 2 VIEWS  LOCATION: [facility]  DATE: 9/3/2023    INDICATION: weakness, prostate cancer  COMPARISON: Chest radiograph 06/11/2021      Impression    IMPRESSION: No evidence of acute cardiopulmonary disease.   MR Thoracic Spine w/o & w Contrast    Narrative    EXAM: MR THORACIC SPINE W/O and W CONTRAST, MR LUMBAR SPINE W/O and W CONTRAST  LOCATION: [facility]  DATE/TIME: 9/3/2023 8:44 PM CDT    INDICATION: Leg weakness, prostate cancer, known spinal metastasis from prostate cancer  COMPARISON:  Bone scan 08/23/2023.  CONTRAST: 7.5 mL Gadavist  TECHNIQUE:   1) Routine Thoracic Spine MRI without and with IV contrast.  2) Routine Lumbar Spine MRI without and with IV contrast.    FINDINGS:  THORACIC SPINE:  Widespread osseous metastases without canal compromise. No findings to suggest an acute compression fracture. Minimal anterolisthesis of T2 on T3 and T3 and T4.  Multilevel spondylitic changes without high-grade canal or foraminal stenosis. No abnormal   cord signal.     5 mm homogeneously enhancing intrathecal lesion posteriorly to the left at T9-T10 (series 12, image 57 and series 11, image 12). No additional abnormal intrathecal enhancement identified.    Left supraclavicular adenopathy.    LUMBAR SPINE:   Nomenclature is based on 5 lumbar type vertebral bodies with L5-S1 defined on image 52 of series 16.   Minimal degenerative anterolisthesis of L4 on L5. Vertebral body heights maintained.   Widespread osseous metastases. Normal distal spinal cord and   cauda equina with conus medullaris at L1. No abnormal intrathecal enhancement.     Prevertebral and dorsal paraspinal soft tissues are unremarkable. Retroperitoneal adenopathy.    Multilevel spondylitic changes without high-grade canal or foraminal stenosis.      Impression    IMPRESSION:  1.  Widespread osseous metastases without canal compromise or pathologic fracture.  2.  5 mm enhancing intradural extramedullary lesion at T9-T10. Primary differential considerations include a peripheral nerve sheath tumor such as a schwannoma versus meningioma. Leptomeningeal metastasis is felt less likely.  3.  Left supraclavicular and retroperitoneal metastatic adenopathy.   MR Lumbar Spine w/o & w Contrast    Narrative    EXAM: MR THORACIC SPINE W/O and W CONTRAST, MR LUMBAR SPINE W/O and W CONTRAST  LOCATION: [facility]  DATE/TIME: 9/3/2023 8:44 PM CDT    INDICATION: Leg weakness, prostate cancer, known spinal metastasis from prostate cancer  COMPARISON:  Bone scan 08/23/2023.  CONTRAST: 7.5 mL Gadavist  TECHNIQUE:   1) Routine Thoracic Spine MRI without and with IV contrast.  2) Routine Lumbar Spine MRI without and with IV contrast.    FINDINGS:  THORACIC SPINE:  Widespread osseous metastases without canal compromise. No findings to suggest an acute compression fracture. Minimal anterolisthesis of T2 on T3 and T3 and T4.  Multilevel spondylitic changes without high-grade canal or foraminal stenosis. No abnormal   cord signal.     5 mm homogeneously enhancing intrathecal lesion posteriorly to the left at T9-T10 (series 12, image 57 and series 11, image 12). No additional abnormal intrathecal enhancement identified.    Left supraclavicular adenopathy.    LUMBAR SPINE:   Nomenclature is based on 5 lumbar type vertebral bodies with L5-S1 defined on image 52  of series 16.  Minimal degenerative anterolisthesis of L4 on L5. Vertebral body heights maintained.   Widespread osseous metastases. Normal distal spinal cord and   cauda equina with conus medullaris at L1. No abnormal intrathecal enhancement.     Prevertebral and dorsal paraspinal soft tissues are unremarkable. Retroperitoneal adenopathy.    Multilevel spondylitic changes without high-grade canal or foraminal stenosis.      Impression    IMPRESSION:  1.  Widespread osseous metastases without canal compromise or pathologic fracture.  2.  5 mm enhancing intradural extramedullary lesion at T9-T10. Primary differential considerations include a peripheral nerve sheath tumor such as a schwannoma versus meningioma. Leptomeningeal metastasis is felt less likely.  3.  Left supraclavicular and retroperitoneal metastatic adenopathy.   MR Brain w/o & w Contrast    Narrative    EXAM: MR BRAIN W/O and W CONTRAST  LOCATION: [facility]  DATE: 9/3/2023    INDICATION:  Confusion, can't walk due to leg weakness, hx of prostate cancer  COMPARISON: Same day thoracic and lumbar MRI  CONTRAST: 7.5mL Gadavist  TECHNIQUE: Routine multiplanar multisequence head MRI without and with intravenous contrast.    FINDINGS:  INTRACRANIAL CONTENTS: No acute or subacute infarct. No mass, acute hemorrhage, or extra-axial fluid collections. Scattered nonspecific foci of T2/FLAIR hyperintense signal in the cerebral white matter. Moderate generalized cerebral atrophy. No   hydrocephalus. Normal position of the cerebellar tonsils. Pachymeningeal enhancement along the left convexity underlying the left temporal osseous metastasis.    SELLA: No abnormality accounting for technique.    OSSEOUS STRUCTURES/SOFT TISSUES:  Numerous osseous metastases, specifically involving the left temporal bone. The major intracranial vascular flow voids are maintained.     ORBITS: No abnormality accounting for technique.     SINUSES/MASTOIDS: No  paranasal sinus mucosal disease. No middle ear or mastoid effusion.       Impression    IMPRESSION:  1.  No acute intracranial abnormality or parenchymal metastasis.  2.  Numerous osseous metastases with pachymeningeal enhancement underlying the left temporal calvarial lesion. This could represent reactive change versus contiguous pachymeningeal spread of disease.

## 2023-09-04 NOTE — PLAN OF CARE
PRIMARY DIAGNOSIS: GENERALIZED WEAKNESS    OUTPATIENT/OBSERVATION GOALS TO BE MET BEFORE DISCHARGE  1. Orthostatic performed: N/A    2. Tolerating PO medications: Yes    3. Return to near baseline physical activity: No    4. Cleared for discharge by consultants (if involved): No    Discharge Planner Nurse   Safe discharge environment identified: No  Barriers to discharge: Yes       Entered by: Linda Byers RN 09/04/2023      Please review provider order for any additional goals.   Nurse to notify provider when observation goals have been met and patient is ready for discharge.    Pt A&O to self, pleasantly confused. IV SL. Assist x2 with gait belt in room. Tolerates Po medication well, PRN oxy given for pain. On fall precautions. Seen by  PT and Hem/Onc. Had chest Ct. Pt resting in bed, call light with in reach. Able to make needs known. Family at bedside.

## 2023-09-04 NOTE — PLAN OF CARE
ROOM # 208-2    Living Situation (if not independent, order SW consult):Home with Spouse  Facility name:  : [facility] ( Spouse)    Activity level at baseline: Assist x 1  Activity level on admit: Assist x 2    Who will be transporting you at discharge: Family    Patient registered to observation; given Patient Bill of Rights; given the opportunity to ask questions about observation status and their plan of care.  Patient has been oriented to the observation room, bathroom and call light is in place.    Discussed discharge goals and expectations with patient/family.

## 2023-09-04 NOTE — ED NOTES
[facility]  ED Nurse Handoff Report    ED Chief complaint: Fatigue  . ED Diagnosis:   Final diagnoses:   Prostate cancer metastatic to bone (H)   Altered mental status, unspecified altered mental status type   Generalized weakness       Allergies: No Known Allergies    Code Status: Full Code    Activity level - Baseline/Home:  assist of 2.  Activity Level - Current:   assist of 2.   Lift room needed: No.   Bariatric: No   Needed: No   Isolation: No.   Infection: Not Applicable.     Respiratory status: Room air    Vital Signs (within 30 minutes):   Vitals:    09/03/23 1715 09/03/23 1745 09/03/23 1900 09/03/23 2104   BP: 97/54 (!) 142/94 (!) 157/90    Pulse: 83 92 89    Resp:       Temp:       TempSrc:       SpO2:   99% 97%       Cardiac Rhythm:  ,      Pain level:    Patient confused: Yes.   Patient Falls Risk: nonskid shoes/slippers when out of bed.   Elimination Status: Has voided urinal    Patient Report - Initial Complaint: Increased confusion and weakness.   Focused Assessment: Migue Beach is a 77 year old male who presents with generalized weakness.  He has a history of prostate cancer.  Patient had recent oncology follow-up CT scan that showed new retroperitoneal metastasis and nuclear medicine bone scan that showed extensive bony metastasis including chronic abscesses.  Over the past 2 weeks, patient has had significant progressive decline in function.  He has had 2 falls at home.  He did hit his head but these.  Patient's family endorses can fusion.  Things are much worse over the past 5 days.  Patient is having much more difficulty walking and feels that his legs are very very weak.  He is unable to ambulate even with a walker now at home.  No fever cough congestion.      Abnormal Results:   Labs Ordered and Resulted from Time of ED Arrival to Time of ED Departure   ROUTINE UA WITH MICROSCOPIC REFLEX TO CULTURE - Abnormal       Result Value    Color Urine Light Yellow       Appearance Urine Clear      Glucose Urine 1000 (*)     Bilirubin Urine Negative      Ketones Urine Negative      Specific Gravity Urine 1.007      Blood Urine Negative      pH Urine 5.0      Protein Albumin Urine Negative      Urobilinogen Urine Normal      Nitrite Urine Negative      Leukocyte Esterase Urine Negative      Mucus Urine Present (*)     RBC Urine <1      WBC Urine <1      Squamous Epithelials Urine <1     COMPREHENSIVE METABOLIC PANEL - Abnormal    Sodium 140      Potassium 3.5      Chloride 101      Carbon Dioxide (CO2) 28      Anion Gap 11      Urea Nitrogen 11.4      Creatinine 0.65 (*)     Calcium 9.4      Glucose 260 (*)     Alkaline Phosphatase 110      AST 19      ALT 19      Protein Total 6.7      Albumin 3.7      Bilirubin Total 0.4      GFR Estimate >90     CBC WITH PLATELETS AND DIFFERENTIAL - Abnormal    WBC Count 6.2      RBC Count 3.85 (*)     Hemoglobin 12.0 (*)     Hematocrit 35.3 (*)     MCV 92      MCH 31.2      MCHC 34.0      RDW 13.3      Platelet Count 187      % Neutrophils 75      % Lymphocytes 16      % Monocytes 6      % Eosinophils 1      % Basophils 1      % Immature Granulocytes 1      NRBCs per 100 WBC 0      Absolute Neutrophils 4.7      Absolute Lymphocytes 1.0      Absolute Monocytes 0.4      Absolute Eosinophils 0.1      Absolute Basophils 0.0      Absolute Immature Granulocytes 0.0      Absolute NRBCs 0.0     LACTIC ACID WHOLE BLOOD - Normal    Lactic Acid 1.2          MR Brain w/o & w Contrast   Final Result   IMPRESSION:   1.  No acute intracranial abnormality or parenchymal metastasis.   2.  Numerous osseous metastases with pachymeningeal enhancement underlying the left temporal calvarial lesion. This could represent reactive change versus contiguous pachymeningeal spread of disease.      MR Lumbar Spine w/o & w Contrast   Final Result   IMPRESSION:   1.  Widespread osseous metastases without canal compromise or pathologic fracture.   2.  5 mm enhancing  intradural extramedullary lesion at T9-T10. Primary differential considerations include a peripheral nerve sheath tumor such as a schwannoma versus meningioma. Leptomeningeal metastasis is felt less likely.   3.  Left supraclavicular and retroperitoneal metastatic adenopathy.      MR Thoracic Spine w/o & w Contrast   Final Result   IMPRESSION:   1.  Widespread osseous metastases without canal compromise or pathologic fracture.   2.  5 mm enhancing intradural extramedullary lesion at T9-T10. Primary differential considerations include a peripheral nerve sheath tumor such as a schwannoma versus meningioma. Leptomeningeal metastasis is felt less likely.   3.  Left supraclavicular and retroperitoneal metastatic adenopathy.      Chest XR,  PA & LAT   Final Result   IMPRESSION: No evidence of acute cardiopulmonary disease.      Head CT w/o contrast   Final Result   IMPRESSION:   1.  No CT evidence for acute intracranial process.   2.  Brain atrophy and presumed chronic microvascular ischemic changes as above.          Treatments provided: Labs, meds, fluids  Family Comments: Family at bedside  OBS brochure/video discussed/provided to patient:  Yes  ED Medications:   Medications   0.9% sodium chloride BOLUS (1,000 mLs Intravenous $New Bag 9/3/23 1817)   LORazepam (ATIVAN) injection 0.5 mg (0.5 mg Intravenous $Given 9/3/23 1908)   gadobutrol (GADAVIST) injection 7.5 mL (7.5 mLs Intravenous $Given 9/3/23 1916)       Drips infusing:  No  For the majority of the shift this patient was Green.   Interventions performed were See MAR .    Sepsis treatment initiated: No    Cares/treatment/interventions/medications to be completed following ED care: N/A    ED Nurse Name: Socorro Byrd RN  10:15 PM   RECEIVING UNIT ED HANDOFF REVIEW    Above ED Nurse Handoff Report was reviewed: Yes  Reviewed by: Ashley Orr RN on September 3, 2023 at 10:32 PM

## 2023-09-04 NOTE — PROGRESS NOTES
[facility]    Medicine Progress Note - Hospitalist Service    Date of Admission:  9/3/2023    Assessment & Plan      Migue Beach is a 77 year old male with PMHx significant for metastatic prostate cancer, DM type II, HTN and HLP, who presents with generalized weakness.    Majority of the history was obtained from the patient's son due to patient's confusion.  Over the last month patient has had increased sleepiness and generalized weakness.  Weakness has significantly worsened since 8/30 resulting in several falls at home and requiring 2 assist.  Family has also noted increased confusion that seems worse over the past couple days.  Poor oral intake due to increased sleepiness.  Patient has been taking oxycodone for approximately 6 months due to low back pain and has increased the dose the last couple days but typically only takes a half a tablet.  Was diagnosed with prostate cancer approximately 6 to 7 years ago, underwent radiation and was stable for several years.  In the last year prostate cancer markers had been increasing and patient has been trialing different treatment options. Recently seen a couple weeks ago at [place] [facility] for follow-up on his prostate cancer and learned about the metastatic prostate diagnosis.  Plan was to begin chemotherapy on 9/8/23.  Denies any fevers, chills, chest pain, shortness of breath, new cough, abdominal pain, nausea/vomiting. No recent sick contacts.    In the ED, afebrile, slightly hypertensive 157/90, heart rate 95, respirations 18, oxygen 97% on room air.  CMP is notable for creatinine of 0.65, glucose 260.  Lactic acid 1.2.  CBC notable for hemoglobin of 12.  UA unremarkable.  CT head shows no evidence of acute intracranial process, presumed chronic microvascular ischemic changes noted.  MRI of the brain shows no acute intracranial abnormality or metastasis, numerous osseous metastasis with patchy meningeal enhancement underlying the left temporal  calvarial lesion, this could represent reactive change versus contiguous patchy meningeal spread of disease. Chest x-ray shows no evidence of acute cardiopulmonary disease.  Thoracic and lumbar MRI shows widespread osseous metastasis without canal compromise or pathologic fracture, 5 mm intradural extramedullary lesion at T9-T10, left supraclavicular and retroperitoneal metastasis adenopathy.    Generalized weakness  Mechanical falls  Confusion  Metastatic prostate cancer  Underwent a fairly significant work-up in the emergency department that was unrevealing.  No obvious infectious etiology.  CT head and MRI imaging negative for acute findings, metastatic osseous lesions noted.    Possibility that his oxycodone at home could be contributing to his increased confusion and weakness.  Although, highly suspicious that his symptoms are related to disease progression of his metastatic prostate cancer.  Primary oncologist is Dr. Chris Damon at [place] [facility]. Unable to find notes in chart about care discussion and treatment plan. Tentative plan is to start chemo on 9/8 per son.   Plan:   - [place] [facility] consulted to discuss prognosis, about pursuing chemo on Friday, etc.  - per oncology note, will hold off on starting docetaxel on Friday. Dr. Damon will be on tomorrow to discuss further treatment with family.   - Oncology recommending B12, folate and thyroid function tests and CT chest with contrast, ordered   - TSH normal, PSA elevated at 28.3  - consider palliative care consult  - PT consulted, recommending TCU if ok to hold off on chemo  - SW consult     DM type II  Managed with metformin 1000 mg at supper.   HgbA1c 8.4  - continue home metformin  - add sliding scale insulin   - hypoglycemia protocol    HTN  HLD  - continue pta amlodipine  - hold statin while OBS    Anxiety   - continue pta ativan PRN, zoloft and cymbalta     **unclear why patient is taking prednisone, not a lot of health history in care everywhere or  "chart.        Diet: Regular Diet Adult    DVT Prophylaxis: Enoxaparin (Lovenox) SQ  Gorman Catheter: Not present  Lines: None     Cardiac Monitoring: None  Code Status: Full Code      Clinically Significant Risk Factors Present on Admission                      # DMII: A1C = 8.4 % (Ref range: <5.7 %) within past 6 months    # Overweight: Estimated body mass index is 27.27 kg/m  as calculated from the following:    Height as of this encounter: 1.702 m (5' 7.01\").    Weight as of this encounter: 79 kg (174 lb 2.6 oz).              Disposition Plan      Expected Discharge Date: 09/05/2023                The patient's care was discussed with the Attending Physician, Dr. Singh, Bedside Nurse, Patient, and Patient's Family.    Gardenia Noguera PA-C  Hospitalist Service  [facility]  Securely message with [employer] (more info)  Text page via [employer] Paging/Directory   ______________________________________________________________________    Interval History   Notes pain with movement, waiting to talk with Oncology    Physical Exam   Vital Signs: Temp: 98.3  F (36.8  C) Temp src: Oral BP: (!) 152/94 Pulse: 88   Resp: 16 SpO2: 97 % O2 Device: None (Room air)    Weight: 174 lbs 2.61 oz    GENERAL:  Comfortable.  PSYCH: pleasant, oriented, No acute distress.  HEART:  RRR  LUNGS:  Normal Respiratory effort.   EXTREMITIES:  No pedal edema, +2 pulses bilateral and equal.  SKIN:  Dry to touch, No rash, wound or ulcerations.  NEUROLOGIC:  grossly intact    Medical Decision Making       45 MINUTES SPENT BY ME on the date of service doing chart review, history, exam, documentation & further activities per the note.      Data     I have personally reviewed the following data over the past 24 hrs:    6.2  \   12.0 (L)   / 187     140 101 11.4 /  260 (H)   3.5 28 0.65 (L) \     ALT: 19 AST: 19 AP: 110 TBILI: 0.4   ALB: 3.7 TOT PROTEIN: 6.7 LIPASE: N/A     TSH: 1.16 T4: N/A A1C: 8.4 (H)     Procal: N/A CRP: N/A Lactic " Acid: 1.2         Imaging results reviewed over the past 24 hrs:   Recent Results (from the past 24 hour(s))   Head CT w/o contrast    Narrative    EXAM: CT HEAD W/O CONTRAST  LOCATION: [facility]  DATE: 9/3/2023    INDICATION: fall, AMS, hx of prostate cancer  COMPARISON: 03/01/2023  TECHNIQUE: Routine CT Head without IV contrast. Multiplanar reformats. Dose reduction techniques were used.    FINDINGS:  INTRACRANIAL CONTENTS: No intracranial hemorrhage, extraaxial collection, or mass effect.  No CT evidence of acute infarct. Mild presumed chronic small vessel ischemic changes. Intracranial atherosclerosis is present. Mild generalized volume loss. No   hydrocephalus.     VISUALIZED ORBITS/SINUSES/MASTOIDS: No intraorbital abnormality. No paranasal sinus mucosal disease. No middle ear or mastoid effusion.    BONES/SOFT TISSUES: No scalp hematoma. No skull fracture.      Impression    IMPRESSION:  1.  No CT evidence for acute intracranial process.  2.  Brain atrophy and presumed chronic microvascular ischemic changes as above.   Chest XR,  PA & LAT    Narrative    EXAM: XR CHEST 2 VIEWS  LOCATION: [facility]  DATE: 9/3/2023    INDICATION: weakness, prostate cancer  COMPARISON: Chest radiograph 06/11/2021      Impression    IMPRESSION: No evidence of acute cardiopulmonary disease.   MR Thoracic Spine w/o & w Contrast    Narrative    EXAM: MR THORACIC SPINE W/O and W CONTRAST, MR LUMBAR SPINE W/O and W CONTRAST  LOCATION: [facility]  DATE/TIME: 9/3/2023 8:44 PM CDT    INDICATION: Leg weakness, prostate cancer, known spinal metastasis from prostate cancer  COMPARISON:  Bone scan 08/23/2023.  CONTRAST: 7.5 mL Gadavist  TECHNIQUE:   1) Routine Thoracic Spine MRI without and with IV contrast.  2) Routine Lumbar Spine MRI without and with IV contrast.    FINDINGS:  THORACIC SPINE:  Widespread osseous metastases without canal compromise. No findings to  suggest an acute compression fracture. Minimal anterolisthesis of T2 on T3 and T3 and T4.  Multilevel spondylitic changes without high-grade canal or foraminal stenosis. No abnormal   cord signal.     5 mm homogeneously enhancing intrathecal lesion posteriorly to the left at T9-T10 (series 12, image 57 and series 11, image 12). No additional abnormal intrathecal enhancement identified.    Left supraclavicular adenopathy.    LUMBAR SPINE:   Nomenclature is based on 5 lumbar type vertebral bodies with L5-S1 defined on image 52 of series 16.  Minimal degenerative anterolisthesis of L4 on L5. Vertebral body heights maintained.   Widespread osseous metastases. Normal distal spinal cord and   cauda equina with conus medullaris at L1. No abnormal intrathecal enhancement.     Prevertebral and dorsal paraspinal soft tissues are unremarkable. Retroperitoneal adenopathy.    Multilevel spondylitic changes without high-grade canal or foraminal stenosis.      Impression    IMPRESSION:  1.  Widespread osseous metastases without canal compromise or pathologic fracture.  2.  5 mm enhancing intradural extramedullary lesion at T9-T10. Primary differential considerations include a peripheral nerve sheath tumor such as a schwannoma versus meningioma. Leptomeningeal metastasis is felt less likely.  3.  Left supraclavicular and retroperitoneal metastatic adenopathy.   MR Lumbar Spine w/o & w Contrast    Narrative    EXAM: MR THORACIC SPINE W/O and W CONTRAST, MR LUMBAR SPINE W/O and W CONTRAST  LOCATION: [facility]  DATE/TIME: 9/3/2023 8:44 PM CDT    INDICATION: Leg weakness, prostate cancer, known spinal metastasis from prostate cancer  COMPARISON:  Bone scan 08/23/2023.  CONTRAST: 7.5 mL Gadavist  TECHNIQUE:   1) Routine Thoracic Spine MRI without and with IV contrast.  2) Routine Lumbar Spine MRI without and with IV contrast.    FINDINGS:  THORACIC SPINE:  Widespread osseous metastases without canal compromise.  No findings to suggest an acute compression fracture. Minimal anterolisthesis of T2 on T3 and T3 and T4.  Multilevel spondylitic changes without high-grade canal or foraminal stenosis. No abnormal   cord signal.     5 mm homogeneously enhancing intrathecal lesion posteriorly to the left at T9-T10 (series 12, image 57 and series 11, image 12). No additional abnormal intrathecal enhancement identified.    Left supraclavicular adenopathy.    LUMBAR SPINE:   Nomenclature is based on 5 lumbar type vertebral bodies with L5-S1 defined on image 52 of series 16.  Minimal degenerative anterolisthesis of L4 on L5. Vertebral body heights maintained.   Widespread osseous metastases. Normal distal spinal cord and   cauda equina with conus medullaris at L1. No abnormal intrathecal enhancement.     Prevertebral and dorsal paraspinal soft tissues are unremarkable. Retroperitoneal adenopathy.    Multilevel spondylitic changes without high-grade canal or foraminal stenosis.      Impression    IMPRESSION:  1.  Widespread osseous metastases without canal compromise or pathologic fracture.  2.  5 mm enhancing intradural extramedullary lesion at T9-T10. Primary differential considerations include a peripheral nerve sheath tumor such as a schwannoma versus meningioma. Leptomeningeal metastasis is felt less likely.  3.  Left supraclavicular and retroperitoneal metastatic adenopathy.   MR Brain w/o & w Contrast    Narrative    EXAM: MR BRAIN W/O and W CONTRAST  LOCATION: [facility]  DATE: 9/3/2023    INDICATION:  Confusion, can't walk due to leg weakness, hx of prostate cancer  COMPARISON: Same day thoracic and lumbar MRI  CONTRAST: 7.5mL Gadavist  TECHNIQUE: Routine multiplanar multisequence head MRI without and with intravenous contrast.    FINDINGS:  INTRACRANIAL CONTENTS: No acute or subacute infarct. No mass, acute hemorrhage, or extra-axial fluid collections. Scattered nonspecific foci of T2/FLAIR hyperintense  signal in the cerebral white matter. Moderate generalized cerebral atrophy. No   hydrocephalus. Normal position of the cerebellar tonsils. Pachymeningeal enhancement along the left convexity underlying the left temporal osseous metastasis.    SELLA: No abnormality accounting for technique.    OSSEOUS STRUCTURES/SOFT TISSUES:  Numerous osseous metastases, specifically involving the left temporal bone. The major intracranial vascular flow voids are maintained.     ORBITS: No abnormality accounting for technique.     SINUSES/MASTOIDS: No paranasal sinus mucosal disease. No middle ear or mastoid effusion.       Impression    IMPRESSION:  1.  No acute intracranial abnormality or parenchymal metastasis.  2.  Numerous osseous metastases with pachymeningeal enhancement underlying the left temporal calvarial lesion. This could represent reactive change versus contiguous pachymeningeal spread of disease.

## 2023-09-04 NOTE — CONSULTS
Care Management Initial Consult    General Information  Assessment completed with: Patient, Family, Pt- Arash, spouse Blanca, son Rey & dtr in law Leta  Type of CM/SW Visit: Initial Assessment    Primary Care Provider verified and updated as needed: Yes   Readmission within the last 30 days: no previous admission in last 30 days      Reason for Consult: discharge planning  Advance Care Planning: Advance Care Planning Reviewed: no concerns identified        Communication Assessment  Patient's communication style: spoken language (English or Bilingual)    Hearing Difficulty or Deaf: no   Wear Glasses or Blind: yes    Cognitive  Cognitive/Neuro/Behavioral: WDL  Level of Consciousness: alert     Orientation: oriented x 4             Living Environment:   People in home: spouse  Blanca  Current living Arrangements: apartment, [address] community      Able to return to prior arrangements: yes    Family/Social Support:  Care provided by: self  Provides care for: no one  Marital Status:   Wife, Children  Blanca       Description of Support System: Supportive, Involved    Support Assessment: Adequate family and caregiver support, Adequate social supports    Current Resources:   Patient receiving home care services: No  Equipment currently used at home: walker, rolling    Employment/Financial:  Employment Status: retired         Financial Concerns: No concerns identified   Referral to Financial Worker: No    Lifestyle & Psychosocial Needs:  Social Determinants of Health     Tobacco Use: Medium Risk (3/1/2023)    Patient History     Smoking Tobacco Use: Former     Smokeless Tobacco Use: Unknown     Passive Exposure: Not on file   Alcohol Use: Not on file   Financial Resource Strain: Not on file   Food Insecurity: Not on file   Transportation Needs: Not on file   Physical Activity: Not on file   Stress: Not on file   Social Connections: Not on file   Intimate Partner Violence: Not on file   Depression: Not on file  "  Housing Stability: Not on file     Functional Status:  Prior to admission patient needed assistance:   Dependent ADLs:: Ambulation-walker  Dependent IADLs:: Transportation    Mental Health Status:  Mental Health Status: No Current Concerns       Chemical Dependency Status:  Chemical Dependency Status: No Current Concerns       Values/Beliefs:  Spiritual, Cultural Beliefs, [employer] Practices, Values that affect care: n/a          Additional Information:  Patient is 77 year old male admitted on 9/3/2023 with a chief complaint of \"generalized weakness\" (per H&P).    Updated by bedside RN that pt and family are requesting to speak with writer.     MARIE met with pt, pt's spouse Blanca, son Rey and dtr in [facility]. Introduced self and social work role. Rey shared they are currently waiting to speak with oncology regarding prognosis and current plan for chemo on Friday (9/8). Per Rey, about 6 months ago, Blanca's health was not in a great spot so family started the process of applying for MA and elderly waiver for both pt and Blanca to move them into an [facility]. Blanca's health improved so she and pt were able to remain in their [address] community apartment however Rey feels pt may now need a higher level of care. Blanca stated she has a procedure coming up next week and will not be available to assist pt if he were to return home. Discussed possible recommendation for TCU and Blanca has been to [facility] before and had a wonderful experience.     MARIE provided MA application and contact # for [facility], per Rey's request.     Social work to follow back up tomorrow and will continue to assist with discharge planning as needed.    LEXY Vinson, [facility]  Care Coordination  [phone]    LEXY Corbett      "

## 2023-09-04 NOTE — PROGRESS NOTES
"   09/04/23 1300   Appointment Info   Signing Clinician's Name / Credentials (PT) Preethi Hickman DPT   Living Environment   People in Home spouse   Current Living Arrangements apartment   Home Accessibility no concerns   Living Environment Comments Pt and spouse live in 55+ apartment; elevator access; spouse assists with IADLs, pt does laundry; spouse assists with lower body dressing, pt can shower self, sits on shower chair in tub/shower combo; uses 4WW for mobility   Self-Care   Usual Activity Tolerance moderate   Current Activity Tolerance fair   Equipment Currently Used at Home walker, rolling   Fall history within last six months yes   Number of times patient has fallen within last six months   (\"probably at least 30\")   General Information   Onset of Illness/Injury or Date of Surgery 09/03/23   Referring Physician Hayley Ohara PA-C   Patient/Family Therapy Goals Statement (PT) main goal is to talk to oncology   Pertinent History of Current Problem (include personal factors and/or comorbidities that impact the POC) 77 year old male with PMH metastatic prostate cancer who presents with generalized weakness.   Existing Precautions/Restrictions fall;spinal   General Observations Supine, NAD   Cognition   Affect/Mental Status (Cognition) unable/difficult to assess  (family answer most questions for pt)   Orientation Status (Cognition) unable/difficult to assess   Follows Commands (Cognition) follows one-step commands;over 90% accuracy   Pain Assessment   Patient Currently in Pain   (No pain at rest, grimaces with activity)   Integumentary/Edema   Integumentary/Edema Comments see RN assessment   Posture    Posture Forward head position;Protracted shoulders   Range of Motion (ROM)   Range of Motion ROM is WFL   Strength (Manual Muscle Testing)   Strength (Manual Muscle Testing) Deficits observed during functional mobility   Strength Comments limited by pain   Bed Mobility   Bed Mobility supine-sit;sit-supine "   Supine-Sit [place] (Bed Mobility) moderate assist (50% patient effort);verbal cues   Sit-Supine [place] (Bed Mobility) moderate assist (50% patient effort);verbal cues   Impairments Contributing to Impaired Bed Mobility pain;decreased strength   Assistive Device (Bed Mobility) bed rails   Comment, (Bed Mobility) cues for log roll technique d/t numerous spinal lesions   Transfers   Transfers sit-stand transfer   Sit-Stand Transfer   Sit-Stand [place] (Transfers) minimum assist (75% patient effort);verbal cues   Assistive Device (Sit-Stand Transfers) walker, front-wheeled   Comment, (Sit-Stand Transfer) initially pulls to walker   Gait/Stairs (Locomotion)   [place] Level (Gait) contact guard;verbal cues   Assistive Device (Gait) walker, front-wheeled   Distance in Feet 5' for eval' add'l for treat   Pattern (Gait) step-to   Deviations/Abnormal Patterns (Gait) festinating/shuffling;base of support, narrow;gait speed decreased   Balance   Balance Comments Good sitting balance, fair standing balance   Clinical Impression   Criteria for Skilled Therapeutic Intervention Yes, treatment indicated   PT Diagnosis (PT) decreased functional mobility   Influenced by the following impairments pain, weakness, impaired balance   Functional limitations due to impairments bed mobility, transfers, ambulation   Clinical Presentation (PT Evaluation Complexity) Evolving/Changing   Clinical Presentation Rationale medical picture, clinical judgement   Clinical Decision Making (Complexity) low complexity   Planned Therapy Interventions (PT) balance training;bed mobility training;gait training;home exercise program;neuromuscular re-education;patient/family education;strengthening;transfer training;progressive activity/exercise;risk factor education;home program guidelines   Anticipated Equipment Needs at Discharge (PT)   (has 2WW and 4WW at home)   Risk & Benefits of therapy have been explained evaluation/treatment  results reviewed;care plan/treatment goals reviewed;risks/benefits reviewed;current/potential barriers reviewed;participants voiced agreement with care plan;participants included;patient;spouse/significant other;son  (DIL)   PT Total Evaluation Time   PT Eval, Low Complexity Minutes (54725) 8   Physical Therapy Goals   PT Frequency 5x/week   PT Predicted Duration/Target Date for Goal Attainment 09/11/23   PT Goals Bed Mobility;Transfers;Gait   PT: Bed Mobility Modified independent;Supine to/from sit;Within precautions   PT: Transfers Modified independent;Sit to/from stand;Bed to/from chair;Assistive device;Within precautions   PT: Gait Modified independent;Rolling walker;100 feet   PT Discharge Planning   PT Plan review spinal precautions, progress transfers and inc ambulation distance as able   PT Discharge Recommendation (DC Rec) Transitional Care Facility   PT Rationale for DC Rec [place] pt is below baseline for mobility with numerous falls at home.  Rec TCU to maximize strength and functional mobility.   PT Brief overview of current status CGA x2 for safety with 2WW, short distance ambulation   Total Session Time   Timed Code Treatment Minutes 24   Total Session Time (sum of timed and untimed services) 32     [facility]  OUTPATIENT PHYSICAL THERAPY EVALUATION  PLAN OF TREATMENT FOR OUTPATIENT REHABILITATION  (COMPLETE FOR INITIAL CLAIMS ONLY)  Patient's Last Name, First Name, M.I.  YOB: 1945  [place] Beach                        Provider's Name  [facility] Medical Record No.  [id number]                             Onset Date:  09/03/23   Start of Care Date:      Type:     _X_PT   ___OT   ___SLP Medical Diagnosis:                 PT Diagnosis:  decreased functional mobility Visits from SOC:  1     See note for plan of treatment, functional goals and certification details    I CERTIFY THE NEED FOR THESE SERVICES FURNISHED UNDER         THIS PLAN OF TREATMENT AND WHILE UNDER MY CARE     (Physician co-signature of this document indicates review and certification of the therapy plan).

## 2023-09-04 NOTE — ED PROVIDER NOTES
Kendy Brasher MD  Physician  Emergency Medicine     Progress Notes  Signed     Date of Service: 9/3/2023  5:58 PM  Creation Time: 9/3/2023  5:58 PM     Expand All Collapse All       History      Chief Complaint:  Fatigue        HPI   Migue Beach is a 77 year old male who presents with generalized weakness.  He has a history of prostate cancer.  Patient had recent oncology follow-up CT scan that showed new retroperitoneal metastasis and nuclear medicine bone scan that showed extensive bony metastasis including chronic abscesses.  Over the past 2 weeks, patient has had significant progressive decline in function.  He has had 2 falls at home.  He did hit his head but these.  Patient's family endorses can fusion.  Things are much worse over the past 5 days.  Patient is having much more difficulty walking and feels that his legs are very very weak.  He is unable to ambulate even with a walker now at home.  No fever cough congestion.     Allergies:  No Known Allergies      Medications:    amLODIPine (NORVASC) 5 MG tablet  ASPIRIN PO  dutasteride (AVODART) 0.5 MG capsule  ESCITALOPRAM OXALATE PO  HYDROcodone-acetaminophen (NORCO)  MG per tablet  LORAZEPAM PO  predniSONE (DELTASONE) 20 MG tablet  SIMVASTATIN PO  TAMSULOSIN HCL PO           Past Medical History:         Past Medical History:   Diagnosis Date    Depressive disorder      Hypertension           Past Surgical History:    Past Surgical History         Past Surgical History:   Procedure Laterality Date    COLONOSCOPY                Family History:    family history is not on file.     Social History:   reports that he has quit smoking. He does not have any smokeless tobacco history on file. He reports current alcohol use. He reports that he does not use drugs.  PCP: Kalpesh Dey      Physical Exam   Patient Vitals for the past 24 hrs:    BP Temp Temp src Pulse Resp SpO2   09/03/23 1715 97/54 -- -- 83 -- --   09/03/23 1701 109/74 98.1  F  (36.7  C) Oral 83 18 94 %         Physical Exam     Gen: alert, confusion  HEENT: no acute abnl  Neck: normal ROM  CV: RRR, no murmurs  Pulm: breath sounds equal, lungs clear  Abd: Soft, nontender  Back: no evidence of injury, no cva tenderness  MSK: no deformity, moves all extremities  Skin: no rash  Neuro: alert, repetative questions, answers direct questions but cannot give full detail, diffuse BLE weakness- cannot lift the legs off the bed, weakness in plantar and dorsiflexion of the ankles        Emergency Department Course   ECG:        ECG results from 03/01/23   EKG 12 lead     Value     Systolic Blood Pressure       Diastolic Blood Pressure       Ventricular Rate 118     Atrial Rate 118     [place] Interval 166     QRS Duration 80          QTc 462     P Axis 26     R AXIS 25     T Axis 35     Interpretation ECG         Sinus tachycardia  Low voltage QRS  Borderline ECG  When compared with ECG of 21-JUN-2017 15:05,  No significant change was found  Confirmed by - EMERGENCY ROOM, PHYSICIAN ([id number]),  PATRICIA CARTAGENA ([id number]) on 3/2/2023 6:35:53 AM            Imaging:  Head CT w/o contrast    (Results Pending)   Chest XR,  PA & LAT    (Results Pending)   MR Lumbar Spine w/o & w Contrast    (Results Pending)   MR Thoracic Spine w/o & w Contrast    (Results Pending)            Laboratory:        Labs Ordered and Resulted from Time of ED Arrival to Time of ED Departure   CBC WITH PLATELETS AND DIFFERENTIAL - Abnormal       Result Value      WBC Count 6.2        RBC Count 3.85 (*)       Hemoglobin 12.0 (*)       Hematocrit 35.3 (*)       MCV 92        MCH 31.2        MCHC 34.0        RDW 13.3        Platelet Count 187        % Neutrophils 75        % Lymphocytes 16        % Monocytes 6        % Eosinophils 1        % Basophils 1        % Immature Granulocytes 1        NRBCs per 100 WBC 0        Absolute Neutrophils 4.7        Absolute Lymphocytes 1.0        Absolute Monocytes 0.4        Absolute Eosinophils 0.1         Absolute Basophils 0.0        Absolute Immature Granulocytes 0.0        Absolute NRBCs 0.0      ROUTINE UA WITH MICROSCOPIC REFLEX TO CULTURE   COMPREHENSIVE METABOLIC PANEL   LACTIC ACID WHOLE BLOOD               Interventions:  Normal Saline 1000 mL  Ativan 0.5mg IV     Impression & Plan       Medical Decision Making:  Patient presents for severe generalized weakness, confusion and severe lower extremity weakness with gait instability in setting of known progressive metastatic prostate cancer.  No signs of infection here.  Chest x-ray negative.  Urine clean.  No overt infectious symptoms.  Abdominal exam is nontender.  Patient with recent falls.  CT head negative for intracranial hemorrhage or skull fracture.  MRI brain negative for metastatic lesion, hydrocephalus or other acute process.  MRI of thoracic and lumbar spine showed extensive bony metastasis however no spinal cord lesion or compression.  No signs of cauda equina.     Patient did also start oxycodone recently for as needed for pain control.  This through his oncologist.  This may be contributing to his symptoms.     Patient did receive 1 L IV fluids here and seems to be improved from a mental status standpoint after receiving this.  Possibly component subclinical dehydration.     Nonetheless, patient is quite weak and unable to ambulate with ongoing confusion.  He will require admission to the hospital for ongoing monitoring, oncology consultation further care.  I did discuss with patient and his family that he may benefit from palliative care consultation as well.        Diagnosis:      ICD-10-CM     1. Prostate cancer metastatic to bone (H)  C61       C79.51         2. Altered mental status, unspecified altered mental status type  R41.82         3. Generalized weakness  R53.1            Kendy Brasher MD           Revision History          Kendy Brasher MD  09/04/23 0139

## 2023-09-04 NOTE — CONSULTS
[facility]    Hematology / Oncology Consultation     Date of Admission:  9/3/2023    Assessment & Plan   Migue Beach is a 77 year old male who was admitted on 9/3/2023. I was asked to see the patient for metastatic prostate cancer.    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   -Initiate work-up for other causes for declining performance status.  Work-up so far suggest patient's diabetes is poorly controlled, there is no significant electrolyte abnormalities to explain, will follow status liver function tests are normal calcium is normal.   -We will request B12 and thyroid function.   -We will request a CT scan of chest with contrast  If no reversible causes were identified will likely declining performance status due to progressive disease.  In that situation we will discuss other less aggressive treatment options versus comfort based care.    Collins Keenan MD   [facility]  Office:  [phone]    Code Status    Full Code    Reason for Consult   Reason for consult: Metastatic prostate cancer    Primary Care Physician   Kalpesh Dey    Chief Complaint   Generalized decline      History of Present Illness   Migue Beach is a 77 year old male who presents with generalized decline.  Patient has history of metastatic prostate cancer with second metastasis to bones and lymph nodes.  Recent scans done on 8/23/2023 of abdomen pelvis showed worsening of extensive metastatic disease in the bones and retroperitoneal lymphadenopathy.  Patient was seen by Dr. Damon and he recommended treatment with docetaxel.  Patient has not started docetaxel yet.  He called our on-call service yesterday with significant decline over the last 1 week.  Patient has been sleeping a lot during the day he is in bed most of the  day and his mobility has decreased, he is unable to walk and has been falling at home he denies any fever he has some pain in his left.  Inguinal region.  No cough sputum production diarrhea blood in urine.  Patient is diabetic but does not check his blood sugars continues to take metformin twice a day.  Previous treatments include ADT, Erleada and most recently Zytiga.   Lab test in the hospital showed normal electrolytes creatinine was 0.65 blood sugar was 260 hemoglobin A1c was 8.4 PSA was 28.3.  White blood cell count was 6.2 hemoglobin 12 platelets 187.   Urinalysis was clear.  Patient had MRI of brain which did not show any acute intracranial abnormalities or parenchymal metastasis there were numerous osseous metastasis with patchy meningeal enhancement underlying the left upper lobe calvarial region.  This could represent reactive changes versus contiguous pachymeningeal spread of disease.     Oncologic history  Mr. Camarena (goes by ArashSophia Beach is a 77-year-old gentleman with recurrent prostate cancer with inguinal and mesenteric lymphadenopathy after prior external beam radiation therapy and prior hormone therapy, previously stopped but now restarted.  ?  He was initially referred to Urology due to elevated PSA of 10.8 on 04/18/2016. Subsequent prostate biopsy on 05/26/2016 unfortunately showed Anil 5+4=9 prostate cancer in 5/13 cores up to 75%. Bone scan and CT were negative for mets at that time.    Accordingly, after discussion of management options, Mr. Beach chose radiation and completed external beam radiation therapy/EBRT at [facility] from July to September of 2016. Androgen deprivation therapy (ADT) with a GnRH analog was started June 2016 to May 2018. Lupron was tolerated well without any side effects.  His PSA was 0.35 in May 2018.    His PSA unfortunately did increase to 36.1 on 11/20/2018 after being off of ADT for 1 year (he was having hot flashes and other symptoms he felt were  intolerable at the time). Of note radiographic imaging on 11/26/2018, unfortunately showed new metastatic disease to periaortic and left internal iliac nodes.  Bone scan at the time was negative.    Thus, Casodex (bicalutamide) was started at that time.  He was restarted on GnRH analog therapy with Eligard 6 month injections as of December 4, 2018.  PSA became undetectable.    PSA became detectable 9/2021.      PSA up to 1.1 by 9/15/2022.   PSMA PET scan showed widely metastatic disease, but the patient remains asymptomatic.    Could not get financial assistance for Zytiga or Xtandi.  Started Erleada late November 2021.     PSA further increased from 2.2-5.0 by 3/2023.  CT CAP and bone scan also showed disease progression.  Fortunately, the patient was still asymptomatic from his disease.    Got Zytiga approved, and patient started Zytiga/prednisone 3/24/2023    Past Medical History   I have reviewed this patient's medical history and updated it with pertinent information if needed.   Past Medical History:   Diagnosis Date    Depressive disorder     anxiety    Hypertension        Past Surgical History   I have reviewed this patient's surgical history and updated it with pertinent information if needed.  Past Surgical History:   Procedure Laterality Date    COLONOSCOPY         Prior to Admission Medications   Prior to Admission Medications   Prescriptions Last Dose Informant Patient Reported? Taking?   DULoxetine (CYMBALTA) 20 MG capsule 9/3/2023 at am Self Yes Yes   Sig: Take 20 mg by mouth 2 times daily   LORazepam (ATIVAN) 1 MG tablet Unknown at prn Self Yes Yes   Sig: Take 1 mg by mouth daily as needed for anxiety   amLODIPine (NORVASC) 5 MG tablet 9/3/2023 at am Self No Yes   Sig: Take 1 tablet (5 mg) by mouth daily   diphenhydrAMINE-zinc acetate (BENADRYL) 1-0.1 % external cream Unknown at prn Self Yes Yes   Sig: Apply topically 3 times daily as needed for itching   metFORMIN (GLUCOPHAGE XR) 500 MG 24 hr tablet  9/2/2023 at pm Self Yes Yes   Sig: Take 1,000 mg by mouth daily (with dinner)   oxyCODONE (ROXICODONE) 5 MG tablet Unknown at prn Self Yes Yes   Sig: Take 5 mg by mouth every 4 hours as needed for severe pain   predniSONE (DELTASONE) 5 MG tablet 9/3/2023 at am Self Yes Yes   Sig: Take 5 mg by mouth 2 times daily   rosuvastatin (CRESTOR) 20 MG tablet 9/3/2023 at am  Yes Yes   Sig: Take 20 mg by mouth daily   sertraline (ZOLOFT) 100 MG tablet 9/3/2023 at am Self Yes Yes   Sig: Take 100 mg by mouth daily      Facility-Administered Medications: None     Allergies   No Known Allergies    Social History   I have reviewed this patient's social history and updated it with pertinent information if needed. Migue Beach  reports that he has quit smoking. He does not have any smokeless tobacco history on file. He reports current alcohol use. He reports that he does not use drugs.    Family History   I have reviewed this patient's family history and updated it with pertinent information if needed.   No family history on file.    Review of Systems     Pleasant 77-year-old gentleman appears comfortable.  Patient denies any diarrhea cough or shortness of breath has some pain in left lower quadrant.     Physical Exam   Temp: 98.3  F (36.8  C) Temp src: Oral BP: (!) 152/94 Pulse: 88   Resp: 16 SpO2: 97 % O2 Device: None (Room air)    Vital Signs with Ranges  Temp:  [96.8  F (36  C)-98.3  F (36.8  C)] 98.3  F (36.8  C)  Pulse:  [83-92] 88  Resp:  [15-18] 16  BP: ()/(54-94) 152/94  SpO2:  [94 %-99 %] 97 %  174 lbs 2.61 oz    Constitutional: Awake, alert, cooperative, no apparent distress. ECOG PS 3  Eyes: Conjunctiva and pupils examined and normal.  GI: Soft, non-distended, non-tender, normal bowel sounds.  No palpable ascites or organomegaly  Lymph/Hematologic: No anterior cervical or supraclavicular adenopathy.  Skin: No rashes, no cyanosis, no edema.  Musculoskeletal: No joint swelling, erythema or tenderness.  Neurologic:  Cranial nerves 2-12 intact, normal strength and sensation.  Psychiatric: Alert, oriented to person, place and time, no obvious anxiety or depression.    Data   Results for orders placed or performed during the hospital encounter of 09/03/23 (from the past 24 hour(s))   [place] Draw    Narrative    The following orders were created for panel order [place] Draw.  Procedure                               Abnormality         Status                     ---------                               -----------         ------                     Extra Blue Top Tube[[id number]]                              Final result               Extra Red Top Tube[[id number]]                               Final result               Extra Green Top (Lithium...[[id number]]                      Final result               Extra Purple Top Tube[[id number]]                            Final result                 Please view results for these tests on the individual orders.   Extra Blue Top Tube   Result Value Ref Range    Hold Specimen JIC    Extra Red Top Tube   Result Value Ref Range    Hold Specimen JIC    Extra Green Top (Lithium Heparin) Tube   Result Value Ref Range    Hold Specimen JIC    Extra Purple Top Tube   Result Value Ref Range    Hold Specimen JIC    CBC + differential    Narrative    The following orders were created for panel order CBC + differential.  Procedure                               Abnormality         Status                     ---------                               -----------         ------                     CBC with platelets and d...[[id number]]  Abnormal            Final result                 Please view results for these tests on the individual orders.   Comprehensive metabolic panel   Result Value Ref Range    Sodium 140 136 - 145 mmol/L    Potassium 3.5 3.4 - 5.3 mmol/L    Chloride 101 98 - 107 mmol/L    Carbon Dioxide (CO2) 28 22 - 29 mmol/L    Anion Gap 11 7 - 15 mmol/L    Urea Nitrogen 11.4 8.0 - 23.0 mg/dL     Creatinine 0.65 (L) 0.67 - 1.17 mg/dL    Calcium 9.4 8.8 - 10.2 mg/dL    Glucose 260 (H) 70 - 99 mg/dL    Alkaline Phosphatase 110 40 - 129 U/L    AST 19 0 - 45 U/L    ALT 19 0 - 70 U/L    Protein Total 6.7 6.4 - 8.3 g/dL    Albumin 3.7 3.5 - 5.2 g/dL    Bilirubin Total 0.4 <=1.2 mg/dL    GFR Estimate >90 >60 mL/min/1.73m2   CBC with platelets and differential   Result Value Ref Range    WBC Count 6.2 4.0 - 11.0 10e3/uL    RBC Count 3.85 (L) 4.40 - 5.90 10e6/uL    Hemoglobin 12.0 (L) 13.3 - 17.7 g/dL    Hematocrit 35.3 (L) 40.0 - 53.0 %    MCV 92 78 - 100 fL    MCH 31.2 26.5 - 33.0 pg    MCHC 34.0 31.5 - 36.5 g/dL    RDW 13.3 10.0 - 15.0 %    Platelet Count 187 150 - 450 10e3/uL    % Neutrophils 75 %    % Lymphocytes 16 %    % Monocytes 6 %    % Eosinophils 1 %    % Basophils 1 %    % Immature Granulocytes 1 %    NRBCs per 100 WBC 0 <1 /100    Absolute Neutrophils 4.7 1.6 - 8.3 10e3/uL    Absolute Lymphocytes 1.0 0.8 - 5.3 10e3/uL    Absolute Monocytes 0.4 0.0 - 1.3 10e3/uL    Absolute Eosinophils 0.1 0.0 - 0.7 10e3/uL    Absolute Basophils 0.0 0.0 - 0.2 10e3/uL    Absolute Immature Granulocytes 0.0 <=0.4 10e3/uL    Absolute NRBCs 0.0 10e3/uL   PSA tumor marker   Result Value Ref Range    PSA Tumor Marker 28.30 (H) 0.00 - 6.50 ng/mL    Narrative    This result is obtained using the Roche Elecsys total PSA method on the beto [id number] immunoassay analyzer. Results obtained with different assay methods or kits cannot be used interchangeably.   TSH with free T4 reflex   Result Value Ref Range    TSH 1.16 0.30 - 4.20 uIU/mL   Hemoglobin A1c   Result Value Ref Range    Hemoglobin A1C 8.4 (H) <5.7 %   UA with Microscopic reflex to Culture    Specimen: Urine, NOS   Result Value Ref Range    Color Urine Light Yellow Colorless, Straw, Light Yellow, Yellow    Appearance Urine Clear Clear    Glucose Urine 1000 (A) Negative mg/dL    Bilirubin Urine Negative Negative    Ketones Urine Negative Negative mg/dL    Specific Gravity Urine  1.007 1.003 - 1.035    Blood Urine Negative Negative    pH Urine 5.0 5.0 - 7.0    Protein Albumin Urine Negative Negative mg/dL    Urobilinogen Urine Normal Normal, 2.0 mg/dL    Nitrite Urine Negative Negative    Leukocyte Esterase Urine Negative Negative    Mucus Urine Present (A) None Seen /LPF    RBC Urine <1 <=2 /HPF    WBC Urine <1 <=5 /HPF    Squamous Epithelials Urine <1 <=1 /HPF    Narrative    Urine Culture not indicated   EKG 12 lead   Result Value Ref Range    Systolic Blood Pressure  mmHg    Diastolic Blood Pressure  mmHg    Ventricular Rate 85 BPM    Atrial Rate 85 BPM    WA Interval 154 ms    QRS Duration 120 ms     ms    QTc 492 ms    P Axis 44 degrees    R AXIS -9 degrees    T Axis 18 degrees    Interpretation ECG       Sinus rhythm with Premature atrial complexes  Right bundle branch block  Abnormal ECG  When compared with ECG of 01-MAR-2023 18:47,  Premature atrial complexes are now Present  Right bundle branch block is now Present     Lactic acid whole blood   Result Value Ref Range    Lactic Acid 1.2 0.7 - 2.0 mmol/L   Head CT w/o contrast    Narrative    EXAM: CT HEAD W/O CONTRAST  LOCATION: [facility]  DATE: 9/3/2023    INDICATION: fall, AMS, hx of prostate cancer  COMPARISON: 03/01/2023  TECHNIQUE: Routine CT Head without IV contrast. Multiplanar reformats. Dose reduction techniques were used.    FINDINGS:  INTRACRANIAL CONTENTS: No intracranial hemorrhage, extraaxial collection, or mass effect.  No CT evidence of acute infarct. Mild presumed chronic small vessel ischemic changes. Intracranial atherosclerosis is present. Mild generalized volume loss. No   hydrocephalus.     VISUALIZED ORBITS/SINUSES/MASTOIDS: No intraorbital abnormality. No paranasal sinus mucosal disease. No middle ear or mastoid effusion.    BONES/SOFT TISSUES: No scalp hematoma. No skull fracture.      Impression    IMPRESSION:  1.  No CT evidence for acute intracranial process.  2.  Brain  atrophy and presumed chronic microvascular ischemic changes as above.   Chest XR,  PA & LAT    Narrative    EXAM: XR CHEST 2 VIEWS  LOCATION: [facility]  DATE: 9/3/2023    INDICATION: weakness, prostate cancer  COMPARISON: Chest radiograph 06/11/2021      Impression    IMPRESSION: No evidence of acute cardiopulmonary disease.   MR Thoracic Spine w/o & w Contrast    Narrative    EXAM: MR THORACIC SPINE W/O and W CONTRAST, MR LUMBAR SPINE W/O and W CONTRAST  LOCATION: [facility]  DATE/TIME: 9/3/2023 8:44 PM CDT    INDICATION: Leg weakness, prostate cancer, known spinal metastasis from prostate cancer  COMPARISON:  Bone scan 08/23/2023.  CONTRAST: 7.5 mL Gadavist  TECHNIQUE:   1) Routine Thoracic Spine MRI without and with IV contrast.  2) Routine Lumbar Spine MRI without and with IV contrast.    FINDINGS:  THORACIC SPINE:  Widespread osseous metastases without canal compromise. No findings to suggest an acute compression fracture. Minimal anterolisthesis of T2 on T3 and T3 and T4.  Multilevel spondylitic changes without high-grade canal or foraminal stenosis. No abnormal   cord signal.     5 mm homogeneously enhancing intrathecal lesion posteriorly to the left at T9-T10 (series 12, image 57 and series 11, image 12). No additional abnormal intrathecal enhancement identified.    Left supraclavicular adenopathy.    LUMBAR SPINE:   Nomenclature is based on 5 lumbar type vertebral bodies with L5-S1 defined on image 52 of series 16.  Minimal degenerative anterolisthesis of L4 on L5. Vertebral body heights maintained.   Widespread osseous metastases. Normal distal spinal cord and   cauda equina with conus medullaris at L1. No abnormal intrathecal enhancement.     Prevertebral and dorsal paraspinal soft tissues are unremarkable. Retroperitoneal adenopathy.    Multilevel spondylitic changes without high-grade canal or foraminal stenosis.      Impression    IMPRESSION:  1.   Widespread osseous metastases without canal compromise or pathologic fracture.  2.  5 mm enhancing intradural extramedullary lesion at T9-T10. Primary differential considerations include a peripheral nerve sheath tumor such as a schwannoma versus meningioma. Leptomeningeal metastasis is felt less likely.  3.  Left supraclavicular and retroperitoneal metastatic adenopathy.   MR Lumbar Spine w/o & w Contrast    Narrative    EXAM: MR THORACIC SPINE W/O and W CONTRAST, MR LUMBAR SPINE W/O and W CONTRAST  LOCATION: [facility]  DATE/TIME: 9/3/2023 8:44 PM CDT    INDICATION: Leg weakness, prostate cancer, known spinal metastasis from prostate cancer  COMPARISON:  Bone scan 08/23/2023.  CONTRAST: 7.5 mL Gadavist  TECHNIQUE:   1) Routine Thoracic Spine MRI without and with IV contrast.  2) Routine Lumbar Spine MRI without and with IV contrast.    FINDINGS:  THORACIC SPINE:  Widespread osseous metastases without canal compromise. No findings to suggest an acute compression fracture. Minimal anterolisthesis of T2 on T3 and T3 and T4.  Multilevel spondylitic changes without high-grade canal or foraminal stenosis. No abnormal   cord signal.     5 mm homogeneously enhancing intrathecal lesion posteriorly to the left at T9-T10 (series 12, image 57 and series 11, image 12). No additional abnormal intrathecal enhancement identified.    Left supraclavicular adenopathy.    LUMBAR SPINE:   Nomenclature is based on 5 lumbar type vertebral bodies with L5-S1 defined on image 52 of series 16.  Minimal degenerative anterolisthesis of L4 on L5. Vertebral body heights maintained.   Widespread osseous metastases. Normal distal spinal cord and   cauda equina with conus medullaris at L1. No abnormal intrathecal enhancement.     Prevertebral and dorsal paraspinal soft tissues are unremarkable. Retroperitoneal adenopathy.    Multilevel spondylitic changes without high-grade canal or foraminal stenosis.      Impression     IMPRESSION:  1.  Widespread osseous metastases without canal compromise or pathologic fracture.  2.  5 mm enhancing intradural extramedullary lesion at T9-T10. Primary differential considerations include a peripheral nerve sheath tumor such as a schwannoma versus meningioma. Leptomeningeal metastasis is felt less likely.  3.  Left supraclavicular and retroperitoneal metastatic adenopathy.   MR Brain w/o & w Contrast    Narrative    EXAM: MR BRAIN W/O and W CONTRAST  LOCATION: [facility]  DATE: 9/3/2023    INDICATION:  Confusion, can't walk due to leg weakness, hx of prostate cancer  COMPARISON: Same day thoracic and lumbar MRI  CONTRAST: 7.5mL Gadavist  TECHNIQUE: Routine multiplanar multisequence head MRI without and with intravenous contrast.    FINDINGS:  INTRACRANIAL CONTENTS: No acute or subacute infarct. No mass, acute hemorrhage, or extra-axial fluid collections. Scattered nonspecific foci of T2/FLAIR hyperintense signal in the cerebral white matter. Moderate generalized cerebral atrophy. No   hydrocephalus. Normal position of the cerebellar tonsils. Pachymeningeal enhancement along the left convexity underlying the left temporal osseous metastasis.    SELLA: No abnormality accounting for technique.    OSSEOUS STRUCTURES/SOFT TISSUES:  Numerous osseous metastases, specifically involving the left temporal bone. The major intracranial vascular flow voids are maintained.     ORBITS: No abnormality accounting for technique.     SINUSES/MASTOIDS: No paranasal sinus mucosal disease. No middle ear or mastoid effusion.       Impression    IMPRESSION:  1.  No acute intracranial abnormality or parenchymal metastasis.  2.  Numerous osseous metastases with pachymeningeal enhancement underlying the left temporal calvarial lesion. This could represent reactive change versus contiguous pachymeningeal spread of disease.        '

## 2023-09-05 ENCOUNTER — APPOINTMENT (OUTPATIENT)
Dept: PHYSICAL THERAPY | Facility: CLINIC | Age: 78
End: 2023-09-05
Payer: COMMERCIAL

## 2023-09-05 LAB
ATRIAL RATE - MUSE: 85 BPM
DIASTOLIC BLOOD PRESSURE - MUSE: NORMAL MMHG
FOLATE SERPL-MCNC: 8.4 NG/ML (ref 4.6–34.8)
GLUCOSE BLDC GLUCOMTR-MCNC: 185 MG/DL (ref 70–99)
GLUCOSE BLDC GLUCOMTR-MCNC: 186 MG/DL (ref 70–99)
GLUCOSE BLDC GLUCOMTR-MCNC: 190 MG/DL (ref 70–99)
GLUCOSE BLDC GLUCOMTR-MCNC: 207 MG/DL (ref 70–99)
GLUCOSE BLDC GLUCOMTR-MCNC: 214 MG/DL (ref 70–99)
HOLD SPECIMEN: NORMAL
INTERPRETATION ECG - MUSE: NORMAL
P AXIS - MUSE: 44 DEGREES
PR INTERVAL - MUSE: 154 MS
QRS DURATION - MUSE: 120 MS
QT - MUSE: 414 MS
QTC - MUSE: 492 MS
R AXIS - MUSE: -9 DEGREES
SYSTOLIC BLOOD PRESSURE - MUSE: NORMAL MMHG
T AXIS - MUSE: 18 DEGREES
VENTRICULAR RATE- MUSE: 85 BPM

## 2023-09-05 PROCEDURE — 36415 COLL VENOUS BLD VENIPUNCTURE: CPT | Performed by: INTERNAL MEDICINE

## 2023-09-05 PROCEDURE — 97110 THERAPEUTIC EXERCISES: CPT | Mod: GP

## 2023-09-05 PROCEDURE — G0378 HOSPITAL OBSERVATION PER HR: HCPCS

## 2023-09-05 PROCEDURE — 99232 SBSQ HOSP IP/OBS MODERATE 35: CPT | Performed by: NURSE PRACTITIONER

## 2023-09-05 PROCEDURE — 82962 GLUCOSE BLOOD TEST: CPT

## 2023-09-05 PROCEDURE — 250N000012 HC RX MED GY IP 250 OP 636 PS 637: Performed by: NURSE PRACTITIONER

## 2023-09-05 PROCEDURE — 96372 THER/PROPH/DIAG INJ SC/IM: CPT | Performed by: PHYSICIAN ASSISTANT

## 2023-09-05 PROCEDURE — 250N000011 HC RX IP 250 OP 636: Mod: JZ | Performed by: PHYSICIAN ASSISTANT

## 2023-09-05 PROCEDURE — 250N000012 HC RX MED GY IP 250 OP 636 PS 637

## 2023-09-05 PROCEDURE — 82746 ASSAY OF FOLIC ACID SERUM: CPT | Performed by: INTERNAL MEDICINE

## 2023-09-05 PROCEDURE — 250N000013 HC RX MED GY IP 250 OP 250 PS 637

## 2023-09-05 RX ORDER — PREDNISONE 5 MG/1
5 TABLET ORAL DAILY
Status: DISCONTINUED | OUTPATIENT
Start: 2023-09-06 | End: 2023-09-07 | Stop reason: HOSPADM

## 2023-09-05 RX ADMIN — DULOXETINE HYDROCHLORIDE 20 MG: 20 CAPSULE, DELAYED RELEASE ORAL at 08:29

## 2023-09-05 RX ADMIN — OXYCODONE HYDROCHLORIDE 5 MG: 5 TABLET ORAL at 08:35

## 2023-09-05 RX ADMIN — INSULIN ASPART 1 UNITS: 100 INJECTION, SOLUTION INTRAVENOUS; SUBCUTANEOUS at 18:27

## 2023-09-05 RX ADMIN — INSULIN ASPART 1 UNITS: 100 INJECTION, SOLUTION INTRAVENOUS; SUBCUTANEOUS at 08:28

## 2023-09-05 RX ADMIN — AMLODIPINE BESYLATE 5 MG: 5 TABLET ORAL at 08:29

## 2023-09-05 RX ADMIN — PREDNISONE 5 MG: 5 TABLET ORAL at 08:29

## 2023-09-05 RX ADMIN — METFORMIN HYDROCHLORIDE 1000 MG: 500 TABLET, EXTENDED RELEASE ORAL at 18:27

## 2023-09-05 RX ADMIN — DULOXETINE HYDROCHLORIDE 20 MG: 20 CAPSULE, DELAYED RELEASE ORAL at 20:34

## 2023-09-05 RX ADMIN — OXYCODONE HYDROCHLORIDE 5 MG: 5 TABLET ORAL at 12:59

## 2023-09-05 RX ADMIN — ENOXAPARIN SODIUM 40 MG: 40 INJECTION SUBCUTANEOUS at 14:22

## 2023-09-05 RX ADMIN — INSULIN ASPART 2 UNITS: 100 INJECTION, SOLUTION INTRAVENOUS; SUBCUTANEOUS at 13:05

## 2023-09-05 RX ADMIN — SERTRALINE HYDROCHLORIDE 100 MG: 100 TABLET ORAL at 08:29

## 2023-09-05 RX ADMIN — OXYCODONE HYDROCHLORIDE 5 MG: 5 TABLET ORAL at 20:35

## 2023-09-05 RX ADMIN — INSULIN HUMAN 5 UNITS: 100 INJECTION, SUSPENSION SUBCUTANEOUS at 13:10

## 2023-09-05 ASSESSMENT — ACTIVITIES OF DAILY LIVING (ADL)
ADLS_ACUITY_SCORE: 36
ADLS_ACUITY_SCORE: 32
ADLS_ACUITY_SCORE: 36
ADLS_ACUITY_SCORE: 32
ADLS_ACUITY_SCORE: 36
ADLS_ACUITY_SCORE: 36
ADLS_ACUITY_SCORE: 32
ADLS_ACUITY_SCORE: 40
ADLS_ACUITY_SCORE: 40

## 2023-09-05 NOTE — PROGRESS NOTES
Care Management Follow Up    Length of Stay (days): 0    Expected Discharge Date: 09/06/2023     Concerns to be Addressed:       Patient plan of care discussed at interdisciplinary rounds: Yes    Anticipated Discharge Disposition: Skilled Nursing Facility     Anticipated Discharge Services: None  Anticipated Discharge DME: None    Patient/family educated on Medicare website which has current facility and service quality ratings: yes  Education Provided on the Discharge Plan:    Patient/Family in Agreement with the Plan: yes    Referrals Placed by CM/SW:    Private pay costs discussed: Not applicable    Additional Information:    SW obtained insurance authorization for pt going to [facility]:   CASE ID: [id number]  APPROVAL: [id number]    Dates: 9/6-9/12     PAS complete [phone]    SINGH Sterling, OPAL  Inpatient Care Coordination  Emergency Room /Float  [phone]  Luba Castellanos, OPAL

## 2023-09-05 NOTE — PLAN OF CARE
"PRIMARY DIAGNOSIS: GENERALIZED WEAKNESS    OUTPATIENT/OBSERVATION GOALS TO BE MET BEFORE DISCHARGE  1. Orthostatic performed: No    2. Tolerating PO medications: Yes    3. Return to near baseline physical activity: No    4. Cleared for discharge by consultants (if involved): No    Discharge Planner Nurse   Safe discharge environment identified: No  Barriers to discharge: Yes       Entered by: Ashley Orr RN 09/04/2023     /68 (BP Location: Right arm)   Pulse 88   Temp 98  F (36.7  C) (Oral)   Resp 17   Ht 1.702 m (5' 7.01\")   Wt 79 kg (174 lb 2.6 oz)   SpO2 94%   BMI 27.27 kg/m         Please review provider order for any additional goals.   Nurse to notify provider when observation goals have been met and patient is ready for discharge.  "

## 2023-09-05 NOTE — PROGRESS NOTES
[facility]    Hematology / Oncology follow-up note    Date of Admission:  9/3/2023    Assessment & Plan   Migue Beach is a 77 year old male who was admitted on 9/3/2023. I was asked to see the patient for metastatic prostate cancer.    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   -Initiate work-up for other causes for declining performance status.  Work-up so far suggest patient's diabetes is poorly controlled, there is no significant electrolyte abnormalities to explain, will follow status liver function tests are normal calcium is normal.   -B12 and thyroid function was normal.   -CT scan of chest did not show any pulmonary metastasis bony metastasis was seen.   Plan   We will discuss with Dr. Damon about treatment options.  Family is concerned about patient's ability to tolerate docetaxel.   -Since patient is no longer on Zytiga could taper prednisone.  Will reduce prednisone to 5 mg daily.     3 diabetes mellitus  -Poorly controlled.  -Patient was taking metformin but not checking blood sugars does not have a primary care physician managing diabetes.  Plan   -Discussed with hospitalist on insulin sliding scale.  -Could consider adding Januvia or glimepiride on discharge.  -We will need diabetic education glucometer.     4.  Impaired mobility  -Need PT OT assessment if patient is able to return home or need rehab.     Collins Keenan MD   [facility]  Office:  [phone]    Code Status    Full Code    Reason for Consult   Reason for consult: Metastatic prostate cancer    Primary Care Physician   Kalpesh Dey    Chief Complaint   Generalized decline      History of Present Illness   Migue Beach is a 77 year old male who presents with generalized decline.  Patient has history of metastatic  prostate cancer with second metastasis to bones and lymph nodes.  Recent scans done on 8/23/2023 of abdomen pelvis showed worsening of extensive metastatic disease in the bones and retroperitoneal lymphadenopathy.  Patient was seen by Dr. Damon and he recommended treatment with docetaxel.  Patient has not started docetaxel yet.  He called our on-call service yesterday with significant decline over the last 1 week.  Patient has been sleeping a lot during the day he is in bed most of the day and his mobility has decreased, he is unable to walk and has been falling at home he denies any fever he has some pain in his left.  Inguinal region.  No cough sputum production diarrhea blood in urine.  Patient is diabetic but does not check his blood sugars continues to take metformin twice a day.  Previous treatments include ADT, Erleada and most recently Zytiga.   Lab test in the hospital showed normal electrolytes creatinine was 0.65 blood sugar was 260 hemoglobin A1c was 8.4 PSA was 28.3.  White blood cell count was 6.2 hemoglobin 12 platelets 187.   Urinalysis was clear.  Patient had MRI of brain which did not show any acute intracranial abnormalities or parenchymal metastasis there were numerous osseous metastasis with patchy meningeal enhancement underlying the left upper lobe calvarial region.  This could represent reactive changes versus contiguous pachymeningeal spread of disease.     Oncologic history  Mr. Camarena (goes by Rossy Yong is a 77-year-old gentleman with recurrent prostate cancer with inguinal and mesenteric lymphadenopathy after prior external beam radiation therapy and prior hormone therapy, previously stopped but now restarted.  ?  He was initially referred to Urology due to elevated PSA of 10.8 on 04/18/2016. Subsequent prostate biopsy on 05/26/2016 unfortunately showed Anil 5+4=9 prostate cancer in 5/13 cores up to 75%. Bone scan and CT were negative for mets at that time.    Accordingly, after  discussion of management options, Mr. Beach chose radiation and completed external beam radiation therapy/EBRT at [facility] from July to September of 2016. Androgen deprivation therapy (ADT) with a GnRH analog was started June 2016 to May 2018. Lupron was tolerated well without any side effects.  His PSA was 0.35 in May 2018.    His PSA unfortunately did increase to 36.1 on 11/20/2018 after being off of ADT for 1 year (he was having hot flashes and other symptoms he felt were intolerable at the time). Of note radiographic imaging on 11/26/2018, unfortunately showed new metastatic disease to periaortic and left internal iliac nodes.  Bone scan at the time was negative.    Thus, Casodex (bicalutamide) was started at that time.  He was restarted on GnRH analog therapy with Eligard 6 month injections as of December 4, 2018.  PSA became undetectable.    PSA became detectable 9/2021.      PSA up to 1.1 by 9/15/2022.   PSMA PET scan showed widely metastatic disease, but the patient remains asymptomatic.    Could not get financial assistance for Zytiga or Xtandi.  Started Erleada late November 2021.     PSA further increased from 2.2-5.0 by 3/2023.  CT CAP and bone scan also showed disease progression.  Fortunately, the patient was still asymptomatic from his disease.    Got Zytiga approved, and patient started Zytiga/prednisone 3/24/2023      Interval history  -No significant change patient denies any pain today.  Has not been out of the bed.     Past Medical History   I have reviewed this patient's medical history and updated it with pertinent information if needed.   Past Medical History:   Diagnosis Date    Depressive disorder     anxiety    Hypertension        Past Surgical History   I have reviewed this patient's surgical history and updated it with pertinent information if needed.  Past Surgical History:   Procedure Laterality Date    COLONOSCOPY         Prior to Admission Medications   Prior to Admission  Medications   Prescriptions Last Dose Informant Patient Reported? Taking?   DULoxetine (CYMBALTA) 20 MG capsule 9/3/2023 at am Self Yes Yes   Sig: Take 20 mg by mouth 2 times daily   LORazepam (ATIVAN) 1 MG tablet Unknown at prn Self Yes Yes   Sig: Take 1 mg by mouth daily as needed for anxiety   amLODIPine (NORVASC) 5 MG tablet 9/3/2023 at am Self No Yes   Sig: Take 1 tablet (5 mg) by mouth daily   diphenhydrAMINE-zinc acetate (BENADRYL) 1-0.1 % external cream Unknown at prn Self Yes Yes   Sig: Apply topically 3 times daily as needed for itching   metFORMIN (GLUCOPHAGE XR) 500 MG 24 hr tablet 9/2/2023 at pm Self Yes Yes   Sig: Take 1,000 mg by mouth daily (with dinner)   oxyCODONE (ROXICODONE) 5 MG tablet Unknown at prn Self Yes Yes   Sig: Take 5 mg by mouth every 4 hours as needed for severe pain   predniSONE (DELTASONE) 5 MG tablet 9/3/2023 at am Self Yes Yes   Sig: Take 5 mg by mouth 2 times daily   rosuvastatin (CRESTOR) 20 MG tablet 9/3/2023 at am  Yes Yes   Sig: Take 20 mg by mouth daily   sertraline (ZOLOFT) 100 MG tablet 9/3/2023 at am Self Yes Yes   Sig: Take 100 mg by mouth daily      Facility-Administered Medications: None     Allergies   No Known Allergies    Social History   I have reviewed this patient's social history and updated it with pertinent information if needed. Migue Beach  reports that he has quit smoking. He does not have any smokeless tobacco history on file. He reports current alcohol use. He reports that he does not use drugs.    Family History   I have reviewed this patient's family history and updated it with pertinent information if needed.   No family history on file.    Review of Systems     Pleasant 77-year-old gentleman appears comfortable.  Patient denies any diarrhea cough or shortness of breath has some pain in left lower quadrant.     Physical Exam   Temp: 97.5  F (36.4  C) Temp src: Axillary BP: 125/78 Pulse: 90   Resp: 18 SpO2: 97 % O2 Device: None (Room air)    Vital  Signs with Ranges  Temp:  [97.3  F (36.3  C)-98  F (36.7  C)] 97.5  F (36.4  C)  Pulse:  [85-95] 90  Resp:  [12-18] 18  BP: (103-144)/(68-83) 125/78  SpO2:  [94 %-97 %] 97 %  174 lbs 2.61 oz    Constitutional: Awake, alert, cooperative, no apparent distress. ECOG PS 3  Psychiatric: Alert, oriented to person, place and time, no obvious anxiety or depression.    Data   Results for orders placed or performed during the hospital encounter of 09/03/23 (from the past 24 hour(s))   Glucose by meter   Result Value Ref Range    GLUCOSE BY METER POCT 240 (H) 70 - 99 mg/dL   CT Chest w/o Contrast    Narrative    EXAM: CT CHEST W/O CONTRAST  LOCATION: [facility]  DATE: 9/4/2023    INDICATION: Metastatic prostate cancer.  COMPARISON: CT chest 03/01/2023 and CT abdomen and pelvis 08/23/2023, and whole-body bone scan 08/23/2023  TECHNIQUE: CT chest without IV contrast. Multiplanar reformats were obtained. Dose reduction techniques were used.  CONTRAST: None.    FINDINGS:   LUNGS AND PLEURA: 3 mm pleural-based pulmonary nodule left lower lobe on image 219 of series 4 is unchanged. 8 mm groundglass density right lung apex on image 72 is also unchanged. No new nodules. No acute infiltrates or effusions. Curvilinear strand of   fibrosis or fibrosis left lower lobe.    MEDIASTINUM/AXILLAE: A few borderline-enlarged paratracheal and subcarinal lymph nodes are unchanged in size. No new adenopathy.    CORONARY ARTERY CALCIFICATION: Moderate.    UPPER ABDOMEN: Enlarged retroperitoneal lymph nodes, incompletely evaluated    MUSCULOSKELETAL: Scattered lytic and blastic lesions throughout the visualized spine as seen previously..      Impression    IMPRESSION:   1.  A few small pulmonary nodules are unchanged. No acute pulmonary disease.  2.  Borderline-enlarged mediastinal nodes are unchanged.  3.  Diffuse skeletal metastasis as seen previously.  4.  Numerous enlarged retroperitoneal lymph nodes. Please refer to prior  CT abdomen report for further discussion.     Glucose by meter   Result Value Ref Range    GLUCOSE BY METER POCT 229 (H) 70 - 99 mg/dL   Glucose by meter   Result Value Ref Range    GLUCOSE BY METER POCT 254 (H) 70 - 99 mg/dL   Glucose by meter   Result Value Ref Range    GLUCOSE BY METER POCT 190 (H) 70 - 99 mg/dL   Extra Tube    Narrative    The following orders were created for panel order Extra Tube.  Procedure                               Abnormality         Status                     ---------                               -----------         ------                     Extra Green Top (Lithium...[[id number]]                      Final result                 Please view results for these tests on the individual orders.   Extra Green Top (Lithium Heparin) Tube   Result Value Ref Range    Hold Specimen [facility]    Glucose by meter   Result Value Ref Range    GLUCOSE BY METER POCT 186 (H) 70 - 99 mg/dL        '

## 2023-09-05 NOTE — PLAN OF CARE
"PRIMARY DIAGNOSIS: GENERALIZED WEAKNESS/FALL at Home    OUTPATIENT/OBSERVATION GOALS TO BE MET BEFORE DISCHARGE  1. Orthostatic performed: No    2. Tolerating PO medications: Yes    3. Return to near baseline physical activity: No    4. Cleared for discharge by consultants (if involved): No    Discharge Planner Nurse   Safe discharge environment identified: No  Barriers to discharge: Yes       Entered by: Ashley Stevens RN 09/05/2023 11:20 AM    Pt A/O x2, some confusion noted but has appropriate communication. Ax2 GB/W. Family at bedside, wanting to speak to SW regarding POC regarding chemo, TCU, and if primary oncologist will see pt today. PT recommending TCU if not pursuing chemotherapy. Pt had pain 6/10, PRN oxy given and was effective per pt. AC/HS BG, 1 unit given per sliding scale insulin. PIV SL.  Vital signs:  Temp: 97.5  F (36.4  C) Temp src: Axillary BP: 125/78 Pulse: 90   Resp: 18 SpO2: 97 % O2 Device: None (Room air)   Height: 170.2 cm (5' 7.01\") Weight: 79 kg (174 lb 2.6 oz)  Estimated body mass index is 27.27 kg/m  as calculated from the following:    Height as of this encounter: 1.702 m (5' 7.01\").    Weight as of this encounter: 79 kg (174 lb 2.6 oz).    Please review provider order for any additional goals.   Nurse to notify provider when observation goals have been met and patient is ready for discharge.  "

## 2023-09-05 NOTE — PLAN OF CARE
"PRIMARY DIAGNOSIS: GENERALIZED WEAKNESS/FALL at Home    OUTPATIENT/OBSERVATION GOALS TO BE MET BEFORE DISCHARGE  1. Orthostatic performed: No    2. Tolerating PO medications: Yes    3. Return to near baseline physical activity: No    4. Cleared for discharge by consultants (if involved): No    Discharge Planner Nurse   Safe discharge environment identified: No  Barriers to discharge: Yes       Entered by: Ashley Stevens RN 09/05/2023 1:31 PM    Pt A/O x2, some confusion noted but has appropriate communication. Ax2 GB/W. Family at bedside, wanting to speak to SW regarding POC regarding chemo, TCU, and if primary oncologist will see pt today. PT recommending TCU if not pursuing chemotherapy. Pt had pain 6/10, PRN oxy given and was effective per pt. AC/HS BG, 1 unit given per sliding scale insulin. PIV SL.  1200 addendum: pt ambulated into the hallway with NA. PRN oxycodone given for 6/10 pain in the lower back. Insulins given. Pt referred to TCU's as wife is unable to care for pt at home with current condition. Back in bed. External catheter hooked up.  Vital signs:  Temp: 97.6  F (36.4  C) Temp src: Oral BP: 119/68 Pulse: 84   Resp: 20 SpO2: 93 % O2 Device: None (Room air)   Height: 170.2 cm (5' 7.01\") Weight: 79 kg (174 lb 2.6 oz)  Estimated body mass index is 27.27 kg/m  as calculated from the following:    Height as of this encounter: 1.702 m (5' 7.01\").    Weight as of this encounter: 79 kg (174 lb 2.6 oz).    Please review provider order for any additional goals.   Nurse to notify provider when observation goals have been met and patient is ready for discharge.  "

## 2023-09-05 NOTE — UTILIZATION REVIEW
Concurrent stay review; Secondary Review Determination - [facility]        Under the authority of the Utilization Management Committee, the utilization review process indicated a secondary review on the above patient.  The review outcome is based on review of the medical records, discussions with staff, and applying clinical experience noted on the date of the review.        (x) Observation/outpatient Status Appropriate - Concurrent stay review       RATIONALE FOR DETERMINATION: 77-year-old male with widely metastatic prostate cancer with tentative plans to initiate chemotherapy next week presents to hospital due to progressive worsening of weakness, ongoing back pain and increased level of confusion making home care difficult.  Patient no longer able to ambulate, even with a walker at home.  Observation care appropriate to rule out reversible etiologies of patient's weakness and worsening confusion.  Now awaiting disposition to TCU.    Patient delayed discharge is related to disposition, there is no medical necessity for inpatient admission at the time of this review. If there is a change in patient status, please resend for review.    The information on this document is developed by the utilization review team in order for the business office to ensure compliance.  This only denotes the appropriateness of proper admission status and does not reflect the quality of care rendered.       The definitions of Inpatient Status and Observation Status used in making the determination above are those provided in the CMS Coverage Manual, Chapter 1 and Chapter 6, section 70.4.       Sincerely,    Franklin Valentine MD, MD

## 2023-09-05 NOTE — PROGRESS NOTES
Care Management Follow Up    Length of Stay (days): 0    Expected Discharge Date: 09/05/2023     Concerns to be Addressed: discharge planning  Patient plan of care discussed at interdisciplinary rounds: Yes    Anticipated Discharge Disposition: TCU     Anticipated Discharge Services:    Anticipated Discharge DME:      Patient/Family in Agreement with the Plan: yes    Referrals Placed by CM/SW:  skilled nursing facilities  Private pay costs discussed: private room/amenity fees and transportation costs    Additional Information:  SW following for discharge planning.     Met with pt, pt's spouse Blanca, son Rey and dtr in law Leta. Plan is for pt to discharge to a TCU to get stronger (chemo will be on hold for duration of TCU stay). Pt/family's number one choice is [facility]. Alternative choices are [facility] and [facility]. Pt would prefer a private room and is aware of additional private cost. Discussed transportation. Pt/family request medical transport be arranged. Reviewed out of pocket cost for [facility] transport, $89.98 base and $5.79 per mile to the destination. Pt/family verbalized understanding and are agreeable.     TCU referrals faxed. Social work will continue to follow and assist with discharge planning as needed.    ADDENDUM @ 1515:    [facility] has accepted pt and is hopeful to admit pt tomorrow (9/6) pending medical clearance and bed availability.     Call placed to pt's spouse Blanca p: [phone] to provide update. [facility]     SW to follow up with [facility] tomorrow to confirm there is a bed available and if pt is medically ready. If pt is ready and bed is available, SW to arrange [facility] w/c ride and then update pt/family on ride time.     Will work on [facility] insurance authorization and PAS.    Social work will continue to follow and assist with discharge planning as needed.    ANJELICA VinsonW, LSW  Care  Coordination  [phone]    ANJELICA CorbettW

## 2023-09-05 NOTE — PLAN OF CARE
"PRIMARY DIAGNOSIS: GENERALIZED WEAKNESS    OUTPATIENT/OBSERVATION GOALS TO BE MET BEFORE DISCHARGE  1. Orthostatic performed: No    2. Tolerating PO medications: Yes    3. Return to near baseline physical activity: No    4. Cleared for discharge by consultants (if involved): No    Discharge Planner Nurse   Safe discharge environment identified: No  Barriers to discharge: Yes       Entered by: Ashley Orr RN 09/05/2023     BP (!) 144/83 (BP Location: Left arm)   Pulse 85   Temp 97.5  F (36.4  C) (Oral)   Resp 12   Ht 1.702 m (5' 7.01\")   Wt 79 kg (174 lb 2.6 oz)   SpO2 94%   BMI 27.27 kg/m       Patient alert with confusion. VSS. Denies pain. Has attempted to get out of bed, ok with redirection. Resting comfortably in bed.Will continue to provide supportive cares.    Please review provider order for any additional goals.   Nurse to notify provider when observation goals have been met and patient is ready for discharge.  "

## 2023-09-05 NOTE — PLAN OF CARE
"PRIMARY DIAGNOSIS: GENERALIZED WEAKNESS    OUTPATIENT/OBSERVATION GOALS TO BE MET BEFORE DISCHARGE  1. Orthostatic performed: No    2. Tolerating PO medications: Yes    3. Return to near baseline physical activity: No    4. Cleared for discharge by consultants (if involved): No    Discharge Planner Nurse   Safe discharge environment identified: No  Barriers to discharge: Yes       Entered by: Ashley Orr RN 09/05/2023     /81 (BP Location: Left arm)   Pulse 92   Temp 97.3  F (36.3  C) (Axillary)   Resp 16   Ht 1.702 m (5' 7.01\")   Wt 79 kg (174 lb 2.6 oz)   SpO2 94%   BMI 27.27 kg/m       Patient alert with confusion. VSS. Denies pain. Has attempted to get out of bed, ok with redirection.Assist x 2 with GB/walker.  Bgs- 254--> 190 tonight.Primo fit in place.Resting comfortably in bed. Bed in low position, call light within reach. & bed alarm on.Will continue to provide supportive cares.    Please review provider order for any additional goals.   Nurse to notify provider when observation goals have been met and patient is ready for discharge.  "

## 2023-09-05 NOTE — PROGRESS NOTES
[facility]    Medicine Progress Note - Hospitalist Service    Date of Admission:  9/3/2023    Assessment & Plan   Migue [place] is a 77 year old male with PMHx significant for metastatic prostate cancer, DM type II, HTN and HLP, who was admitted on 9/3/2023 with generalized weakness.     Majority of the history was obtained from the patient's son due to patient's confusion.  Over the last month patient has had increased sleepiness and generalized weakness.  Weakness has significantly worsened since 8/30 resulting in several falls at home and requiring 2 assist.  Family has also noted increased confusion that seems worse over the past couple days.  Poor oral intake due to increased sleepiness.  Patient has been taking oxycodone for approximately 6 months due to low back pain and has increased the dose the last couple days but typically only takes a half a tablet.  Was diagnosed with prostate cancer approximately 6 to 7 years ago, underwent radiation and was stable for several years.  In the last year prostate cancer markers had been increasing and patient has been trialing different treatment options. Recently seen a couple weeks ago at [facility] for follow-up on his prostate cancer and learned about the metastatic prostate diagnosis.  Plan was to begin chemotherapy on 9/8/23.  Denies any fevers, chills, chest pain, shortness of breath, new cough, abdominal pain, nausea/vomiting. No recent sick contacts.     In the ED, afebrile, slightly hypertensive 157/90, heart rate 95, respirations 18, oxygen 97% on room air.  CMP was notable for creatinine of 0.65, glucose 260.  Lactic acid 1.2.  CBC notable for hemoglobin of 12.  UA unremarkable.  CT head showed no evidence of acute intracranial process, presumed chronic microvascular ischemic changes noted.  MRI of the brain showed no acute intracranial abnormality or metastasis, numerous osseous metastasis with patchy meningeal enhancement underlying  the left temporal calvarial lesion, this could represent reactive change versus contiguous patchy meningeal spread of disease. Chest x-ray showed no evidence of acute cardiopulmonary disease.  Thoracic and lumbar MRI showed widespread osseous metastasis without canal compromise or pathologic fracture, 5 mm intradural extramedullary lesion at T9-T10, left supraclavicular and retroperitoneal metastasis adenopathy.     Generalized weakness  Mechanical falls  Confusion  Metastatic prostate cancer  Underwent a fairly significant work-up in the emergency department that was unrevealing.  No obvious infectious etiology.  CT head and MRI imaging negative for acute findings, metastatic osseous lesions noted.    Possibility that his oxycodone at home could be contributing to his increased confusion and weakness.  Although, highly suspicious that his symptoms are related to disease progression of his metastatic prostate cancer.  Primary oncologist is Dr. Chris Damon at [facility]. Unable to find notes in chart about care discussion and treatment plan. Original plan was to start chemo on 9/8 per son.   Plan:   - [facility] consulted to discuss prognosis, seen by covering provider with his primary oncologist returning to see him 9/6/2023  - Oncology initially recommended B12, folate and thyroid function tests and CT chest with contrast  - TSH normal, PSA elevated at 28.3 (9/3)  - CT chest: No major changes or acute processes (9/4) when allowing for non- contrasted study -- a few small pulmonary nodules unchanged. No acute pulmonary disease.  Borderline enlarged mediastinal nodes are unchanged.  Diffuse skeletal metastasis as previously noted. .   - Dr. Guadalupe with [facility] followed up with patient this morning (9/5) and we addressed several concerns -- diabetic management (see below) and disposition. Plan to hold docetaxel until overall improvement in patient status is seen. Dr. Damon will be in tomorrow (9/6) to  "discuss further treatment with family.   - PT consulted, recommending TCU  - SW consulting for TCU placement  - consider palliative care consult pending conversation with Dr. Damon in the am.     DM type II -- A1C 8.4.  -254.  Managed with metformin 1000 mg at supper. However, does not check his blood sugars at home.  Has been on Prednisone 5 mg BID for sometime which can drive his blood sugars up.    Plan:  - continue home metformin  - added 5 units of NPH daily to cover Prednisone.  Tapering Prednisone to 5 mg daily.  Was started by Oncology (okay with Oncology) to decrease. Consider oral agent but given advanced age and steroid use -- A1C is actually near goal (<8). Will have RN do diabetic teaching and will have outpatient diabetic counseling/education when discharged from TCU.   - hypoglycemia protocol  - Initiate prednisone taper (originally placed on prednisone by oncology for previous cancer treatment) as above.   - Consider additional treatment options to improve at home diabetes management -- next reasonable step would be either SGLT 2 or a GLP-1.  However, given upcoming chemotherapy -- consider insulin coverage, especially if PO status will be variable due to anticipated side effects (e.g., nausea/vomiting/decreased PO).       HTN  HLD  - continue pta amlodipine  - hold statin while inpatient. Resume at discharge.     Anxiety   - continue pta ativan PRN, zoloft and cymbalta         Diet: Regular Diet Adult    DVT Prophylaxis: Enoxaparin (Lovenox) subcutaneous. Padua score 7  Gorman Catheter: Not present  Lines: None     Cardiac Monitoring: None  Code Status: Full Code      Clinically Significant Risk Factors Present on Admission                      # DMII: A1C = 8.4 % (Ref range: <5.7 %) within past 6 months    # Overweight: Estimated body mass index is 27.27 kg/m  as calculated from the following:    Height as of this encounter: 1.702 m (5' 7.01\").    Weight as of this encounter: 79 kg (174 lb " "2.6 oz).              Disposition Plan  pending clinical course.     Expected Discharge Date: 09/06/2023,  3:00 PM              The patient's care was discussed with the Bedside Nurse, Care Coordinator/, Patient, Patient's Family, and Oncologist.  Consultant(s).    MOHAN Lock APRN CNP  Hospitalist Service  [facility]  Securely message with [employer] (more info)  Text page via [employer] Paging/Directory   ________________________________________________________________    \"I was present with the student who participated in the service and in the documentation of the note. I have verified the history and personally performed the physical exam and medical decision-making. I agree with the assessment and plan of care as documented in the note.\"     SANDRA Timmons CNP      Interval History   Assist x2 with GB/walker, alert with some confusion per overnight reports, some 6/10 pain noted early in morning (treated effectively with PRN oxy). Reports no chest pain, shortness of breath, or other symptoms.    Physical Exam   Vital Signs: Temp: 97.5  F (36.4  C) Temp src: Axillary BP: 125/78 Pulse: 90   Resp: 18 SpO2: 97 % O2 Device: None (Room air)    Weight: 174 lbs 2.61 oz    General Appearance: Alert & attentive, interactive & pleasant, NAD  Respiratory: Regular rate & rhythm of breathing, non-labored, CTA bilaterally  Cardiovascular: RRR, normal S1 & S2 without RMCG, PT pulses 2+ bilaterally  GI: Normoactive bowel sounds, abdomen soft & non-tender to palpation  Skin: Warm & dry, no lower extremity edema, redness, or tenderness to palpation      Medical Decision Making       45 MINUTES SPENT BY ME on the date of service doing chart review, history, exam, documentation & further activities per the note.      Data     I have personally reviewed the following data over the past 24 hrs:    TSH: N/A T4: N/A A1C: N/A       Imaging results reviewed over the past 24 hrs: "   Recent Results (from the past 24 hour(s))   CT Chest w/o Contrast    Narrative    EXAM: CT CHEST W/O CONTRAST  LOCATION: [facility]  DATE: 9/4/2023    INDICATION: Metastatic prostate cancer.  COMPARISON: CT chest 03/01/2023 and CT abdomen and pelvis 08/23/2023, and whole-body bone scan 08/23/2023  TECHNIQUE: CT chest without IV contrast. Multiplanar reformats were obtained. Dose reduction techniques were used.  CONTRAST: None.    FINDINGS:   LUNGS AND PLEURA: 3 mm pleural-based pulmonary nodule left lower lobe on image 219 of series 4 is unchanged. 8 mm groundglass density right lung apex on image 72 is also unchanged. No new nodules. No acute infiltrates or effusions. Curvilinear strand of   fibrosis or fibrosis left lower lobe.    MEDIASTINUM/AXILLAE: A few borderline-enlarged paratracheal and subcarinal lymph nodes are unchanged in size. No new adenopathy.    CORONARY ARTERY CALCIFICATION: Moderate.    UPPER ABDOMEN: Enlarged retroperitoneal lymph nodes, incompletely evaluated    MUSCULOSKELETAL: Scattered lytic and blastic lesions throughout the visualized spine as seen previously..      Impression    IMPRESSION:   1.  A few small pulmonary nodules are unchanged. No acute pulmonary disease.  2.  Borderline-enlarged mediastinal nodes are unchanged.  3.  Diffuse skeletal metastasis as seen previously.  4.  Numerous enlarged retroperitoneal lymph nodes. Please refer to prior CT abdomen report for further discussion.

## 2023-09-06 LAB
ALBUMIN SERPL BCG-MCNC: 3.2 G/DL (ref 3.5–5.2)
ALP SERPL-CCNC: 102 U/L (ref 40–129)
ALT SERPL W P-5'-P-CCNC: 22 U/L (ref 0–70)
ANION GAP SERPL CALCULATED.3IONS-SCNC: 10 MMOL/L (ref 7–15)
AST SERPL W P-5'-P-CCNC: 22 U/L (ref 0–45)
BILIRUB SERPL-MCNC: 0.2 MG/DL
BUN SERPL-MCNC: 9.3 MG/DL (ref 8–23)
CALCIUM SERPL-MCNC: 9.3 MG/DL (ref 8.8–10.2)
CHLORIDE SERPL-SCNC: 102 MMOL/L (ref 98–107)
CREAT SERPL-MCNC: 0.46 MG/DL (ref 0.67–1.17)
DEPRECATED HCO3 PLAS-SCNC: 24 MMOL/L (ref 22–29)
ERYTHROCYTE [DISTWIDTH] IN BLOOD BY AUTOMATED COUNT: 13 % (ref 10–15)
GFR SERPL CREATININE-BSD FRML MDRD: >90 ML/MIN/1.73M2
GLUCOSE BLDC GLUCOMTR-MCNC: 164 MG/DL (ref 70–99)
GLUCOSE BLDC GLUCOMTR-MCNC: 167 MG/DL (ref 70–99)
GLUCOSE BLDC GLUCOMTR-MCNC: 186 MG/DL (ref 70–99)
GLUCOSE BLDC GLUCOMTR-MCNC: 217 MG/DL (ref 70–99)
GLUCOSE BLDC GLUCOMTR-MCNC: 280 MG/DL (ref 70–99)
GLUCOSE SERPL-MCNC: 151 MG/DL (ref 70–99)
HCT VFR BLD AUTO: 36.3 % (ref 40–53)
HGB BLD-MCNC: 12.1 G/DL (ref 13.3–17.7)
MAGNESIUM SERPL-MCNC: 1.6 MG/DL (ref 1.7–2.3)
MCH RBC QN AUTO: 30.7 PG (ref 26.5–33)
MCHC RBC AUTO-ENTMCNC: 33.3 G/DL (ref 31.5–36.5)
MCV RBC AUTO: 92 FL (ref 78–100)
PLATELET # BLD AUTO: 168 10E3/UL (ref 150–450)
POTASSIUM SERPL-SCNC: 3.3 MMOL/L (ref 3.4–5.3)
POTASSIUM SERPL-SCNC: 4.9 MMOL/L (ref 3.4–5.3)
PROT SERPL-MCNC: 6.3 G/DL (ref 6.4–8.3)
RBC # BLD AUTO: 3.94 10E6/UL (ref 4.4–5.9)
SODIUM SERPL-SCNC: 136 MMOL/L (ref 136–145)
WBC # BLD AUTO: 5.7 10E3/UL (ref 4–11)

## 2023-09-06 PROCEDURE — 80053 COMPREHEN METABOLIC PANEL: CPT | Performed by: NURSE PRACTITIONER

## 2023-09-06 PROCEDURE — 250N000013 HC RX MED GY IP 250 OP 250 PS 637: Performed by: NURSE PRACTITIONER

## 2023-09-06 PROCEDURE — 36415 COLL VENOUS BLD VENIPUNCTURE: CPT | Performed by: NURSE PRACTITIONER

## 2023-09-06 PROCEDURE — 250N000011 HC RX IP 250 OP 636: Mod: JZ | Performed by: PHYSICIAN ASSISTANT

## 2023-09-06 PROCEDURE — 250N000013 HC RX MED GY IP 250 OP 250 PS 637

## 2023-09-06 PROCEDURE — 99232 SBSQ HOSP IP/OBS MODERATE 35: CPT | Performed by: NURSE PRACTITIONER

## 2023-09-06 PROCEDURE — 83735 ASSAY OF MAGNESIUM: CPT | Performed by: NURSE PRACTITIONER

## 2023-09-06 PROCEDURE — G0378 HOSPITAL OBSERVATION PER HR: HCPCS

## 2023-09-06 PROCEDURE — 82962 GLUCOSE BLOOD TEST: CPT

## 2023-09-06 PROCEDURE — 85027 COMPLETE CBC AUTOMATED: CPT | Performed by: NURSE PRACTITIONER

## 2023-09-06 PROCEDURE — 250N000012 HC RX MED GY IP 250 OP 636 PS 637: Performed by: NURSE PRACTITIONER

## 2023-09-06 PROCEDURE — 96372 THER/PROPH/DIAG INJ SC/IM: CPT | Performed by: PHYSICIAN ASSISTANT

## 2023-09-06 PROCEDURE — 84132 ASSAY OF SERUM POTASSIUM: CPT | Performed by: NURSE PRACTITIONER

## 2023-09-06 RX ORDER — MAGNESIUM OXIDE 400 MG/1
400 TABLET ORAL DAILY
DISCHARGE
Start: 2023-09-07

## 2023-09-06 RX ORDER — AMOXICILLIN 250 MG
1 CAPSULE ORAL 2 TIMES DAILY PRN
DISCHARGE
Start: 2023-09-06

## 2023-09-06 RX ORDER — PREDNISONE 5 MG/1
TABLET ORAL
DISCHARGE
Start: 2023-09-07 | End: 2024-01-01

## 2023-09-06 RX ORDER — POTASSIUM CHLORIDE 1500 MG/1
40 TABLET, EXTENDED RELEASE ORAL ONCE
Status: COMPLETED | OUTPATIENT
Start: 2023-09-06 | End: 2023-09-06

## 2023-09-06 RX ORDER — ONDANSETRON 4 MG/1
4 TABLET, ORALLY DISINTEGRATING ORAL EVERY 6 HOURS PRN
DISCHARGE
Start: 2023-09-06

## 2023-09-06 RX ORDER — ACETAMINOPHEN 325 MG/1
650 TABLET ORAL EVERY 6 HOURS PRN
DISCHARGE
Start: 2023-09-06 | End: 2023-01-01 | Stop reason: DRUGHIGH

## 2023-09-06 RX ORDER — LORAZEPAM 1 MG/1
0.5 TABLET ORAL EVERY 6 HOURS PRN
Qty: 20 TABLET | Refills: 0 | Status: SHIPPED | OUTPATIENT
Start: 2023-09-06 | End: 2024-01-01

## 2023-09-06 RX ORDER — MAGNESIUM OXIDE 400 MG/1
400 TABLET ORAL DAILY
Status: DISCONTINUED | OUTPATIENT
Start: 2023-09-06 | End: 2023-09-07 | Stop reason: HOSPADM

## 2023-09-06 RX ORDER — OXYCODONE HYDROCHLORIDE 5 MG/1
2.5-5 TABLET ORAL EVERY 4 HOURS PRN
Qty: 15 TABLET | Refills: 0 | Status: SHIPPED | OUTPATIENT
Start: 2023-09-06 | End: 2023-09-07

## 2023-09-06 RX ORDER — NICOTINE POLACRILEX 4 MG
15-30 LOZENGE BUCCAL
DISCHARGE
Start: 2023-09-06

## 2023-09-06 RX ORDER — ENOXAPARIN SODIUM 100 MG/ML
40 INJECTION SUBCUTANEOUS EVERY 24 HOURS
DISCHARGE
Start: 2023-09-06 | End: 2023-09-18

## 2023-09-06 RX ADMIN — OXYCODONE HYDROCHLORIDE 5 MG: 5 TABLET ORAL at 15:18

## 2023-09-06 RX ADMIN — DULOXETINE HYDROCHLORIDE 20 MG: 20 CAPSULE, DELAYED RELEASE ORAL at 20:18

## 2023-09-06 RX ADMIN — POTASSIUM CHLORIDE 40 MEQ: 1500 TABLET, EXTENDED RELEASE ORAL at 10:30

## 2023-09-06 RX ADMIN — PREDNISONE 5 MG: 5 TABLET ORAL at 07:58

## 2023-09-06 RX ADMIN — DULOXETINE HYDROCHLORIDE 20 MG: 20 CAPSULE, DELAYED RELEASE ORAL at 07:58

## 2023-09-06 RX ADMIN — INSULIN ASPART 2 UNITS: 100 INJECTION, SOLUTION INTRAVENOUS; SUBCUTANEOUS at 17:39

## 2023-09-06 RX ADMIN — INSULIN ASPART 1 UNITS: 100 INJECTION, SOLUTION INTRAVENOUS; SUBCUTANEOUS at 08:04

## 2023-09-06 RX ADMIN — OXYCODONE HYDROCHLORIDE 5 MG: 5 TABLET ORAL at 06:24

## 2023-09-06 RX ADMIN — INSULIN HUMAN 5 UNITS: 100 INJECTION, SUSPENSION SUBCUTANEOUS at 08:05

## 2023-09-06 RX ADMIN — ACETAMINOPHEN 650 MG: 325 TABLET, FILM COATED ORAL at 18:56

## 2023-09-06 RX ADMIN — ENOXAPARIN SODIUM 40 MG: 40 INJECTION SUBCUTANEOUS at 15:18

## 2023-09-06 RX ADMIN — METFORMIN HYDROCHLORIDE 1000 MG: 500 TABLET, EXTENDED RELEASE ORAL at 17:38

## 2023-09-06 RX ADMIN — SERTRALINE HYDROCHLORIDE 100 MG: 100 TABLET ORAL at 07:58

## 2023-09-06 RX ADMIN — MAGNESIUM OXIDE TAB 400 MG (241.3 MG ELEMENTAL MG) 400 MG: 400 (241.3 MG) TAB at 10:30

## 2023-09-06 RX ADMIN — INSULIN ASPART 3 UNITS: 100 INJECTION, SOLUTION INTRAVENOUS; SUBCUTANEOUS at 14:16

## 2023-09-06 RX ADMIN — ACETAMINOPHEN 650 MG: 325 TABLET, FILM COATED ORAL at 07:58

## 2023-09-06 RX ADMIN — OXYCODONE HYDROCHLORIDE 5 MG: 5 TABLET ORAL at 10:46

## 2023-09-06 RX ADMIN — AMLODIPINE BESYLATE 5 MG: 5 TABLET ORAL at 07:58

## 2023-09-06 ASSESSMENT — ACTIVITIES OF DAILY LIVING (ADL)
ADLS_ACUITY_SCORE: 40
ADLS_ACUITY_SCORE: 40
ADLS_ACUITY_SCORE: 30
ADLS_ACUITY_SCORE: 40
ADLS_ACUITY_SCORE: 40
ADLS_ACUITY_SCORE: 30
ADLS_ACUITY_SCORE: 40

## 2023-09-06 NOTE — PLAN OF CARE
"PRIMARY DIAGNOSIS: GENERALIZED WEAKNESS    OUTPATIENT/OBSERVATION GOALS TO BE MET BEFORE DISCHARGE  1. Orthostatic performed: N/A    2. Tolerating PO medications: Yes    3. Return to near baseline physical activity: No    4. Cleared for discharge by consultants (if involved): YES    Discharge Planner Nurse   Safe discharge environment identified: NO Barriers to discharge: Yes TCU referral pending Entered by: Patricia Scruggs RN 09/06/2023     Patient is A&O x 4, forgetful. Up with one assist. Current pain level 5/10 prn tylenol and oxycodone given. RN managed potassium & magnesium replaced. PT rec TCU. Plan to discharge [facility] tomorrow [id number] am. Plan of care ongoing.  /77 (BP Location: Left arm)   Pulse 85   Temp 98  F (36.7  C) (Oral)   Resp 16   Ht 1.702 m (5' 7.01\")   Wt 79 kg (174 lb 2.6 oz)   SpO2 95%   BMI 27.27 kg/m      Please review provider order for any additional goals.   Nurse to notify provider when observation goals have been met and patient is ready for discharge.Goal Outcome Evaluation:                        "

## 2023-09-06 NOTE — PLAN OF CARE
PRIMARY DIAGNOSIS: GENERALIZED WEAKNESS/FALL at Home    OUTPATIENT/OBSERVATION GOALS TO BE MET BEFORE DISCHARGE  1. Orthostatic performed: No    2. Tolerating PO medications: Yes    3. Return to near baseline physical activity: No    4. Cleared for discharge by consultants (if involved): No    Discharge Planner Nurse   Safe discharge environment identified: No  Barriers to discharge: Yes       Entered by: Rhys Ramos RN 09/05/2023   for any additional goals.   Nurse to notify provider when observation goals have been met and patient is ready for discharge.

## 2023-09-06 NOTE — PLAN OF CARE
"PRIMARY DIAGNOSIS: GENERALIZED WEAKNESS    OUTPATIENT/OBSERVATION GOALS TO BE MET BEFORE DISCHARGE  1. Orthostatic performed: N/A    2. Tolerating PO medications: Yes    3. Return to near baseline physical activity: No    4. Cleared for discharge by consultants (if involved): YES    Discharge Planner Nurse   Safe discharge environment identified: NO Barriers to discharge: Yes TCU referral pending Entered by: Patricia Scruggs RN 09/06/2023     Patient is A&O x 4, forgetful. Up with one assist. Current pain level 5/10 prn tylenol and oxycodone given. RN managed potassium & magnesium replaced. PT rec TCU, referral pending. Plan of care ongoing.  /77 (BP Location: Left arm)   Pulse 85   Temp 98  F (36.7  C) (Oral)   Resp 16   Ht 1.702 m (5' 7.01\")   Wt 79 kg (174 lb 2.6 oz)   SpO2 95%   BMI 27.27 kg/m      Please review provider order for any additional goals.   Nurse to notify provider when observation goals have been met and patient is ready for discharge.Goal Outcome Evaluation:                        "

## 2023-09-06 NOTE — PLAN OF CARE
PRIMARY DIAGNOSIS: GENERALIZED WEAKNESS/FALL at Home    OUTPATIENT/OBSERVATION GOALS TO BE MET BEFORE DISCHARGE  1. Orthostatic performed: No    2. Tolerating PO medications: Yes    3. Return to near baseline physical activity: No    4. Cleared for discharge by consultants (if involved): No    Discharge Planner Nurse   Safe discharge environment identified: No  Barriers to discharge: Yes       Entered by: Rhys Ramos RN 09/05/2023   for any additional goals.   Nurse to notify provider when observation goals have been met and patient is ready for discharge.      Pt alert and oriented x 4 forgetful at times.VSS on RA.Ax2 GB/W .PRN oxycodone given for 6/10 pain in the lower back .External catheter in placed.possible discharge tomorrow to TCU.AC/HS BG.Reg diet tolerating well.

## 2023-09-06 NOTE — DISCHARGE SUMMARY
"[facility]  Hospitalist Discharge Summary      Date of Admission:  9/3/2023  Date of Discharge:  9/7/2023  Discharging Provider: SANDRA Davalos CNP  Discharge Service: Hospitalist Service    Discharge Diagnoses   See below    Clinically Significant Risk Factors     # DMII: A1C = 8.4 % (Ref range: <5.7 %) within past 6 months    # Overweight: Estimated body mass index is 27.27 kg/m  as calculated from the following:    Height as of this encounter: 1.702 m (5' 7.01\").    Weight as of this encounter: 79 kg (174 lb 2.6 oz).       Follow-ups Needed After Discharge       Unresulted Labs Ordered in the Past 30 Days of this Admission       No orders found from 8/4/2023 to 9/4/2023.        These results will be followed up by     Discharge Disposition   Discharged to rehabilitation facility  Condition at discharge: Stable    Hospital Course   [place] is a 77 year old male with PMHx significant for metastatic prostate cancer, DM type II, HTN and HLP, who was admitted on 9/3/2023 with generalized weakness.     Majority of the history was obtained from the patient's son due to patient's confusion.  Over the last month patient has had increased sleepiness and generalized weakness.  Weakness has significantly worsened since 8/30 resulting in several falls at home and requiring 2 assist.  Family has also noted increased confusion that seems worse over the past couple days.  Poor oral intake due to increased sleepiness.  Patient has been taking oxycodone for approximately 6 months due to low back pain and has increased the dose the last couple days but typically only takes a half a tablet.  Was diagnosed with prostate cancer approximately 6 to 7 years ago, underwent radiation and was stable for several years.  In the last year prostate cancer markers had been increasing and patient has been trialing different treatment options. Recently seen a couple weeks ago at [facility] for follow-up on his " prostate cancer and learned about the metastatic prostate diagnosis.  Plan was to begin chemotherapy on 9/8/23.  Denies any fevers, chills, chest pain, shortness of breath, new cough, abdominal pain, nausea/vomiting. No recent sick contacts.     In the ED, afebrile, slightly hypertensive 157/90, heart rate 95, respirations 18, oxygen 97% on room air.  CMP was notable for creatinine of 0.65, glucose 260.  Lactic acid 1.2.  CBC notable for hemoglobin of 12.  UA unremarkable.  CT head showed no evidence of acute intracranial process, presumed chronic microvascular ischemic changes noted.  MRI of the brain showed no acute intracranial abnormality or metastasis, numerous osseous metastasis with patchy meningeal enhancement underlying the left temporal calvarial lesion, this could represent reactive change versus contiguous patchy meningeal spread of disease. Chest x-ray showed no evidence of acute cardiopulmonary disease.  Thoracic and lumbar MRI showed widespread osseous metastasis without canal compromise or pathologic fracture, 5 mm intradural extramedullary lesion at T9-T10, left supraclavicular and retroperitoneal metastasis adenopathy.     Generalized weakness  Mechanical falls  Confusion  Metastatic prostate cancer  Underwent a fairly significant work-up in the emergency department that was unrevealing.  No obvious infectious etiology.  CT head and MRI imaging negative for acute findings, metastatic osseous lesions noted.    Possibility that his oxycodone at home could be contributing to his increased confusion and weakness.  Although, highly suspicious that his symptoms are related to disease progression of his metastatic prostate cancer.    Primary oncologist is Dr. Chris Damon at [facility]. Unable to find notes in chart about care discussion and treatment plan. Original plan was to start chemo on 9/8 per son.  Oncology initially recommended B12, folate and thyroid function tests and CT chest with  contrast  - TSH normal, PSA elevated at 28.3 (9/3)  - CT chest: No major changes or acute processes (9/4) when allowing for non- contrasted study -- a few small pulmonary nodules unchanged.  No acute pulmonary disease.  Borderline enlarged mediastinal nodes are unchanged.  Diffuse skeletal metastasis as previously noted.    Seen by Dr. Damon's colleague, Dr. Guadalupe, on 9/5 who discussed concerns pertaining to the patient's at home diabetic management and their disposition.  Dr. Damon visited the patient and family this morning (9/6) and discussed next steps.  Per family, the current plan is to enter TCU care to improve overall strength and status before starting Taxotere.     DM type II -- A1C 8.4.  -254.  Managed with metformin 1000 mg at supper. However, does not check his blood sugars at home.  Has been on Prednisone 5 mg BID for sometime which can drive his blood sugars up.  Continued home metformin during admission & added 5 units of NPH daily to cover Prednisone.  Tapering Prednisone to 5 mg daily.  Was started by Oncology (okay with Oncology) to decrease.  Hypoglycemia protocol.  Consider oral agent but given advanced age and steroid use his A1C is actually near goal (<8).     - Will have RN do diabetic teaching and will have outpatient diabetic counseling/education when discharged from TCU.    - Initiated prednisone taper (originally placed on prednisone by oncology for previous cancer treatment) as above.   - Consider additional treatment options to improve at home diabetes management -- next reasonable step would be either SGLT 2 or a GLP-1.  However, given upcoming chemotherapy -- consider insulin coverage, especially if PO status will be variable due to anticipated side effects (e.g., nausea/vomiting/decreased PO).      HTN  HLD  - Continued pta amlodipine  - Held statin while inpatient. Resume at discharge.     Anxiety   - Continued pta ativan PRN, zoloft and cymbalta       Consultations This  Hospital Stay   HEMATOLOGY & ONCOLOGY IP CONSULT  PHYSICAL THERAPY ADULT IP CONSULT  CARE MANAGEMENT / SOCIAL WORK IP CONSULT  PHYSICAL THERAPY ADULT IP CONSULT  OCCUPATIONAL THERAPY ADULT IP CONSULT    Code Status   Full Code    Time Spent on this Encounter   I, SANDRA Timmons CNP, personally saw the patient today and spent greater than 30 minutes discharging this patient.     MOHAN Lock (Huber)  SANDRA Davalos CNP  [facility] OBSERVATION DEPT  [address]  [facility] [address]  Phone: [phone]  ________________________________________________________________  I was present with the student who participated in the service and in the documentation of the note.  I have verified the history and personally performed the physical exam and plan of care as documented in the note.    SANDRA Timmons CNP on 9/7/2023 at 2:33 PM     Physical Exam   Vital Signs: Temp: 98.3  F (36.8  C) Temp src: Axillary BP: 104/64 Pulse: 86   Resp: 18 SpO2: 95 % O2 Device: None (Room air)    Weight: 174 lbs 2.61 oz  General Appearance:  Alert & attentive, interactive & pleasant, NAD  Respiratory: Regular rate & rhythm of breathing, non-labored, CTA bilaterally  Cardiovascular: RRR, normal S1 & S2 without RMCG, DP pulses 2+ bilaterally  GI: Normoactive bowel sounds, abdomen soft & non-tender to palpation  Skin: Warm & dry, no lower extremity edema, redness, or tenderness to palpation         Primary Care Physician   Kalpesh Dey    Discharge Orders      Diabetes Educator Referral      General info for SNF    Length of Stay Estimate: Short Term Care: Estimated # of Days <30  Condition at Discharge: Improving  Level of care:skilled   Rehabilitation Potential: Good  Admission H&P remains valid and up-to-date: Yes  Recent Chemotherapy: yes, but chemotherapy on hold while in TCU.  Will follow up with Oncology once discharged from Tcu.  Use Nursing Home Standing Orders: Yes     [facility]  instructions    Give two-step Mantoux (PPD) Per Facility Policy Yes     Follow Up and recommended labs and tests    Follow up with prison physician.  The following labs/tests are recommended: CBC, BMP, Mg in 1 week (9/13).  Follow up with  in Oncology after discharge from TCU to arrange chemotherapy.     Reason for your hospital stay    Generalized weakness and failure to thrive in the setting of metastatic prostate cancer  T2DM (marginal controlled)  HTN  HLD  Anxiety     Glucose monitor nursing POCT    Before meals,  at bedtime, and PRN.     Activity - Up ad jacquie     Encourage PO fluids     Full Code     Physical Therapy Adult Consult    Evaluate and treat as clinically indicated.    Reason: deconditioning/FTT in the setting of metastatic prostate cancer.     Occupational Therapy Adult Consult    Evaluate and treat as clinically indicated.    Reason:  deconditiong/FTT in the setting of metastatic prostate cancer.     Fall precautions     Diet    Follow this diet upon discharge: Orders Placed This Encounter      Regular Diet Adult       Significant Results and Procedures   Most Recent 3 CBC's:  Recent Labs   Lab Test 09/06/23  0609 09/03/23  1712 03/01/23  1818   WBC 5.7 6.2 9.2   HGB 12.1* 12.0* 12.4*   MCV 92 92 94    187 174     Most Recent 3 BMP's:  Recent Labs   Lab Test 09/07/23  0841 09/07/23  0812 09/07/23  0206 09/06/23  2237 09/06/23  1732 09/06/23  1502 09/06/23  0721 09/06/23  0609 09/04/23  1548 09/03/23  1712 08/23/23  1158 03/01/23  1818   NA  --   --   --   --   --   --   --  136  --  140  --  133*   POTASSIUM 4.1  --   --   --   --  4.9  --  3.3*  --  3.5  --  3.7   CHLORIDE  --   --   --   --   --   --   --  102  --  101  --  97*   CO2  --   --   --   --   --   --   --  24  --  28  --  22   BUN  --   --   --   --   --   --   --  9.3  --  11.4  --  12.9   CR  --   --   --   --   --   --   --  0.46*  --  0.65* 0.5* 0.57*   ANIONGAP  --   --   --   --   --   --   --  10  --  11   --  14   MARKELL  --   --   --   --   --   --   --  9.3  --  9.4  --  9.3   GLC  --  153* 156* 186*   < >  --    < > 151*   < > 260*  --  189*    < > = values in this interval not displayed.     Most Recent Hemoglobin A1c:  Recent Labs   Lab Test 09/03/23  1712   A1C 8.4*     Most Recent Urinalysis:  Recent Labs   Lab Test 09/03/23  1751   COLOR Light Yellow   APPEARANCE Clear   URINEGLC 1000*   URINEBILI Negative   URINEKETONE Negative   SG 1.007   UBLD Negative   URINEPH 5.0   PROTEIN Negative   NITRITE Negative   LEUKEST Negative   RBCU <1   WBCU <1   ,   Results for orders placed or performed during the hospital encounter of 09/03/23   Head CT w/o contrast    Narrative    EXAM: CT HEAD W/O CONTRAST  LOCATION: [facility]  DATE: 9/3/2023    INDICATION: fall, AMS, hx of prostate cancer  COMPARISON: 03/01/2023  TECHNIQUE: Routine CT Head without IV contrast. Multiplanar reformats. Dose reduction techniques were used.    FINDINGS:  INTRACRANIAL CONTENTS: No intracranial hemorrhage, extraaxial collection, or mass effect.  No CT evidence of acute infarct. Mild presumed chronic small vessel ischemic changes. Intracranial atherosclerosis is present. Mild generalized volume loss. No   hydrocephalus.     VISUALIZED ORBITS/SINUSES/MASTOIDS: No intraorbital abnormality. No paranasal sinus mucosal disease. No middle ear or mastoid effusion.    BONES/SOFT TISSUES: No scalp hematoma. No skull fracture.      Impression    IMPRESSION:  1.  No CT evidence for acute intracranial process.  2.  Brain atrophy and presumed chronic microvascular ischemic changes as above.   Chest XR,  PA & LAT    Narrative    EXAM: XR CHEST 2 VIEWS  LOCATION: [facility]  DATE: 9/3/2023    INDICATION: weakness, prostate cancer  COMPARISON: Chest radiograph 06/11/2021      Impression    IMPRESSION: No evidence of acute cardiopulmonary disease.   MR Lumbar Spine w/o & w Contrast    Narrative    EXAM: MR THORACIC  SPINE W/O and W CONTRAST, MR LUMBAR SPINE W/O and W CONTRAST  LOCATION: [facility]  DATE/TIME: 9/3/2023 8:44 PM CDT    INDICATION: Leg weakness, prostate cancer, known spinal metastasis from prostate cancer  COMPARISON:  Bone scan 08/23/2023.  CONTRAST: 7.5 mL Gadavist  TECHNIQUE:   1) Routine Thoracic Spine MRI without and with IV contrast.  2) Routine Lumbar Spine MRI without and with IV contrast.    FINDINGS:  THORACIC SPINE:  Widespread osseous metastases without canal compromise. No findings to suggest an acute compression fracture. Minimal anterolisthesis of T2 on T3 and T3 and T4.  Multilevel spondylitic changes without high-grade canal or foraminal stenosis. No abnormal   cord signal.     5 mm homogeneously enhancing intrathecal lesion posteriorly to the left at T9-T10 (series 12, image 57 and series 11, image 12). No additional abnormal intrathecal enhancement identified.    Left supraclavicular adenopathy.    LUMBAR SPINE:   Nomenclature is based on 5 lumbar type vertebral bodies with L5-S1 defined on image 52 of series 16.  Minimal degenerative anterolisthesis of L4 on L5. Vertebral body heights maintained.   Widespread osseous metastases. Normal distal spinal cord and   cauda equina with conus medullaris at L1. No abnormal intrathecal enhancement.     Prevertebral and dorsal paraspinal soft tissues are unremarkable. Retroperitoneal adenopathy.    Multilevel spondylitic changes without high-grade canal or foraminal stenosis.      Impression    IMPRESSION:  1.  Widespread osseous metastases without canal compromise or pathologic fracture.  2.  5 mm enhancing intradural extramedullary lesion at T9-T10. Primary differential considerations include a peripheral nerve sheath tumor such as a schwannoma versus meningioma. Leptomeningeal metastasis is felt less likely.  3.  Left supraclavicular and retroperitoneal metastatic adenopathy.   MR Thoracic Spine w/o & w Contrast    Narrative     EXAM: MR THORACIC SPINE W/O and W CONTRAST, MR LUMBAR SPINE W/O and W CONTRAST  LOCATION: [facility]  DATE/TIME: 9/3/2023 8:44 PM CDT    INDICATION: Leg weakness, prostate cancer, known spinal metastasis from prostate cancer  COMPARISON:  Bone scan 08/23/2023.  CONTRAST: 7.5 mL Gadavist  TECHNIQUE:   1) Routine Thoracic Spine MRI without and with IV contrast.  2) Routine Lumbar Spine MRI without and with IV contrast.    FINDINGS:  THORACIC SPINE:  Widespread osseous metastases without canal compromise. No findings to suggest an acute compression fracture. Minimal anterolisthesis of T2 on T3 and T3 and T4.  Multilevel spondylitic changes without high-grade canal or foraminal stenosis. No abnormal   cord signal.     5 mm homogeneously enhancing intrathecal lesion posteriorly to the left at T9-T10 (series 12, image 57 and series 11, image 12). No additional abnormal intrathecal enhancement identified.    Left supraclavicular adenopathy.    LUMBAR SPINE:   Nomenclature is based on 5 lumbar type vertebral bodies with L5-S1 defined on image 52 of series 16.  Minimal degenerative anterolisthesis of L4 on L5. Vertebral body heights maintained.   Widespread osseous metastases. Normal distal spinal cord and   cauda equina with conus medullaris at L1. No abnormal intrathecal enhancement.     Prevertebral and dorsal paraspinal soft tissues are unremarkable. Retroperitoneal adenopathy.    Multilevel spondylitic changes without high-grade canal or foraminal stenosis.      Impression    IMPRESSION:  1.  Widespread osseous metastases without canal compromise or pathologic fracture.  2.  5 mm enhancing intradural extramedullary lesion at T9-T10. Primary differential considerations include a peripheral nerve sheath tumor such as a schwannoma versus meningioma. Leptomeningeal metastasis is felt less likely.  3.  Left supraclavicular and retroperitoneal metastatic adenopathy.   MR Brain w/o & w Contrast     Narrative    EXAM: MR BRAIN W/O and W CONTRAST  LOCATION: [facility]  DATE: 9/3/2023    INDICATION:  Confusion, can't walk due to leg weakness, hx of prostate cancer  COMPARISON: Same day thoracic and lumbar MRI  CONTRAST: 7.5mL Gadavist  TECHNIQUE: Routine multiplanar multisequence head MRI without and with intravenous contrast.    FINDINGS:  INTRACRANIAL CONTENTS: No acute or subacute infarct. No mass, acute hemorrhage, or extra-axial fluid collections. Scattered nonspecific foci of T2/FLAIR hyperintense signal in the cerebral white matter. Moderate generalized cerebral atrophy. No   hydrocephalus. Normal position of the cerebellar tonsils. Pachymeningeal enhancement along the left convexity underlying the left temporal osseous metastasis.    SELLA: No abnormality accounting for technique.    OSSEOUS STRUCTURES/SOFT TISSUES:  Numerous osseous metastases, specifically involving the left temporal bone. The major intracranial vascular flow voids are maintained.     ORBITS: No abnormality accounting for technique.     SINUSES/MASTOIDS: No paranasal sinus mucosal disease. No middle ear or mastoid effusion.       Impression    IMPRESSION:  1.  No acute intracranial abnormality or parenchymal metastasis.  2.  Numerous osseous metastases with pachymeningeal enhancement underlying the left temporal calvarial lesion. This could represent reactive change versus contiguous pachymeningeal spread of disease.   CT Chest w/o Contrast    Narrative    EXAM: CT CHEST W/O CONTRAST  LOCATION: [facility]  DATE: 9/4/2023    INDICATION: Metastatic prostate cancer.  COMPARISON: CT chest 03/01/2023 and CT abdomen and pelvis 08/23/2023, and whole-body bone scan 08/23/2023  TECHNIQUE: CT chest without IV contrast. Multiplanar reformats were obtained. Dose reduction techniques were used.  CONTRAST: None.    FINDINGS:   LUNGS AND PLEURA: 3 mm pleural-based pulmonary nodule left lower lobe on image  219 of series 4 is unchanged. 8 mm groundglass density right lung apex on image 72 is also unchanged. No new nodules. No acute infiltrates or effusions. Curvilinear strand of   fibrosis or fibrosis left lower lobe.    MEDIASTINUM/AXILLAE: A few borderline-enlarged paratracheal and subcarinal lymph nodes are unchanged in size. No new adenopathy.    CORONARY ARTERY CALCIFICATION: Moderate.    UPPER ABDOMEN: Enlarged retroperitoneal lymph nodes, incompletely evaluated    MUSCULOSKELETAL: Scattered lytic and blastic lesions throughout the visualized spine as seen previously..      Impression    IMPRESSION:   1.  A few small pulmonary nodules are unchanged. No acute pulmonary disease.  2.  Borderline-enlarged mediastinal nodes are unchanged.  3.  Diffuse skeletal metastasis as seen previously.  4.  Numerous enlarged retroperitoneal lymph nodes. Please refer to prior CT abdomen report for further discussion.         Discharge Medications   Discharge Medication List as of 9/7/2023 10:52 AM        START taking these medications    Details   acetaminophen (TYLENOL) 325 MG tablet Take 2 tablets (650 mg) by mouth every 6 hours as needed for mild pain or other (and adjunct with moderate or severe pain or per patient request), Transitional      enoxaparin ANTICOAGULANT (LOVENOX) 40 MG/0.4ML syringe Inject 0.4 mLs (40 mg) Subcutaneous every 24 hours, Transitional      glucose 40 % (400 mg/mL) gel Take 15-30 g by mouth every 15 minutes as needed for low blood sugar (for blood sugar <60 mg/dl)Transitional      !! insulin aspart (NOVOLOG PEN) 100 UNIT/ML pen Inject 1-7 Units Subcutaneous 3 times daily (before meals) Correction Scale - MEDIUM INSULIN RESISTANCE DOSING     Do Not give Correction Insulin if Pre-Meal BG less than 140.   For Pre-Meal  - 189 give 1 unit.   For Pre-Meal  - 239 give 2 un its.   For Pre-Meal  - 289 give 3 units.   For Pre-Meal  - 339 give 4 units.   For Pre-Meal - 399 give 5  units.   For Pre-Meal -449 give 6 units  For Pre-Meal BG greater than or equal to 450 give 7 units.   To be given with prandi al insulin, and based on pre-meal blood glucose.    Notify provider if glucose greater than or equal to 350 mg/dL after administration of correction dose.  If given at mealtime, administer within 30 minutes of start of meal., Disp-15 mL, Transitional      !! insulin aspart (NOVOLOG PEN) 100 UNIT/ML pen Inject 1-5 Units Subcutaneous At Bedtime MEDIUM INSULIN RESISTANCE DOSING    Do Not give Bedtime Correction Insulin if BG less than  200.   For  - 249 give 1 units.   For  - 299 give 2 units.   For  - 349 give 3 units.   For  -399  give 4 units.   For BG greater than or equal to 400 give 5 units.  Notify provider if glucose greater than or equal to 350 mg/dL after administration of correction dose.  If given at mealtime, administer within 30 minutes of start of meal., Disp-15 mL,  Transitional      insulin  UNIT/ML injection Inject 5 Units Subcutaneous every morning (before breakfast), Disp-15 mL, TransitionalReevaluate need once prednisone taper completed.      magnesium oxide (MAG-OX) 400 MG tablet Take 1 tablet (400 mg) by mouth daily, Transitional      melatonin 1 MG TABS tablet Take 3 tablets (3 mg) by mouth nightly as needed for sleep, Transitional      ondansetron (ZOFRAN ODT) 4 MG ODT tab Take 1 tablet (4 mg) by mouth every 6 hours as needed for nausea or vomiting, Transitional      senna-docusate (SENOKOT-S/PERICOLACE) 8.6-50 MG tablet Take 1 tablet by mouth 2 times daily as needed for constipation, Transitional       !! - Potential duplicate medications found. Please discuss with provider.        CONTINUE these medications which have CHANGED    Details   LORazepam (ATIVAN) 1 MG tablet Take 0.5 tablets (0.5 mg) by mouth every 6 hours as needed for anxiety, nausea or pain, Disp-20 tablet, R-0, Local Print      predniSONE (DELTASONE) 5 MG tablet Take  1 tablet a day for the next 7 days then 1 tablet every other day for 7 days then off., TransitionalWeaning off Prednisone.      oxyCODONE (ROXICODONE) 5 MG tablet Take 0.5-1 tablets (2.5-5 mg) by mouth every 4 hours as needed (pain 4-6/10 = 2.5 mg and pain 7-10/10 = 5 mg), Disp-15 tablet, R-0, Local Print           CONTINUE these medications which have NOT CHANGED    Details   amLODIPine (NORVASC) 5 MG tablet Take 1 tablet (5 mg) by mouth daily, Disp-30 tablet, R-0, Local Print      diphenhydrAMINE-zinc acetate (BENADRYL) 1-0.1 % external cream Apply topically 3 times daily as needed for itchingHistorical      DULoxetine (CYMBALTA) 20 MG capsule Take 20 mg by mouth 2 times daily, Historical      metFORMIN (GLUCOPHAGE XR) 500 MG 24 hr tablet Take 1,000 mg by mouth daily (with dinner), Historical      rosuvastatin (CRESTOR) 20 MG tablet Take 20 mg by mouth daily, Historical      sertraline (ZOLOFT) 100 MG tablet Take 100 mg by mouth daily, Historical           Allergies   No Known Allergies

## 2023-09-06 NOTE — PROGRESS NOTES
[facility]    Medicine Progress Note - Hospitalist Service    Date of Admission:  9/3/2023    Assessment & Plan   Migue Beach is a 77 year old male with PMHx significant for metastatic prostate cancer, DM type II, HTN and HLP, who was admitted on 9/3/2023 with generalized weakness.     Majority of the history was obtained from the patient's son due to patient's confusion.  Over the last month patient has had increased sleepiness and generalized weakness.  Weakness has significantly worsened since 8/30 resulting in several falls at home and requiring 2 assist.  Family has also noted increased confusion that seems worse over the past couple days.  Poor oral intake due to increased sleepiness.  Patient has been taking oxycodone for approximately 6 months due to low back pain and has increased the dose the last couple days but typically only takes a half a tablet.  Was diagnosed with prostate cancer approximately 6 to 7 years ago, underwent radiation and was stable for several years.  In the last year prostate cancer markers had been increasing and patient has been trialing different treatment options. Recently seen a couple weeks ago at [facility] for follow-up on his prostate cancer and learned about the metastatic prostate diagnosis.  Plan was to begin chemotherapy on 9/8/23.  Denies any fevers, chills, chest pain, shortness of breath, new cough, abdominal pain, nausea/vomiting. No recent sick contacts.     In the ED, afebrile, slightly hypertensive 157/90, heart rate 95, respirations 18, oxygen 97% on room air.  CMP was notable for creatinine of 0.65, glucose 260.  Lactic acid 1.2.  CBC notable for hemoglobin of 12.  UA unremarkable.  CT head showed no evidence of acute intracranial process, presumed chronic microvascular ischemic changes noted.  MRI of the brain showed no acute intracranial abnormality or metastasis, numerous osseous metastasis with patchy meningeal enhancement underlying  the left temporal calvarial lesion, this could represent reactive change versus contiguous patchy meningeal spread of disease. Chest x-ray showed no evidence of acute cardiopulmonary disease.  Thoracic and lumbar MRI showed widespread osseous metastasis without canal compromise or pathologic fracture, 5 mm intradural extramedullary lesion at T9-T10, left supraclavicular and retroperitoneal metastasis adenopathy.     Generalized weakness  Mechanical falls  Confusion  Metastatic prostate cancer  Underwent a fairly significant work-up in the emergency department that was unrevealing.  No obvious infectious etiology.  CT head and MRI imaging negative for acute findings, metastatic osseous lesions noted.    Possibility that his oxycodone at home could be contributing to his increased confusion and weakness.  Although, highly suspicious that his symptoms are related to disease progression of his metastatic prostate cancer.     Primary oncologist is Dr. Chris Damon at [facility]. Unable to find notes in chart about care discussion and treatment plan. Original plan was to start chemo on 9/8 per son.  Oncology initially recommended B12, folate and thyroid function tests and CT chest with contrast  - TSH normal, PSA elevated at 28.3 (9/3)  - CT chest: No major changes or acute processes (9/4) when allowing for non- contrasted study -- a few small pulmonary nodules unchanged.  No acute pulmonary disease.  Borderline enlarged mediastinal nodes are unchanged.  Diffuse skeletal metastasis as previously noted.     Seen by Dr. Damon's colleague, Dr. Guadalupe, on 9/5 who discussed concerns pertaining to the patient's at home diabetic management and their disposition.  Dr. Damon visited the patient and family this morning (9/6) and discussed next steps.  Per family, the current plan is to enter TCU care to improve overall strength and status before starting docetaxel for chemotherapy.   - Expected admission date to TCU is  tomorrow 9/7 based on bed availability. Medically stable for discharge.      DM type II -- A1C 8.4.  -254.  Managed with metformin 1000 mg at supper. However, does not check his blood sugars at home.  Has been on Prednisone 5 mg BID for sometime which can drive his blood sugars up.  Continued home metformin during admission & added 5 units of NPH daily to cover Prednisone.  Tapering Prednisone to 5 mg daily.  Was started by Oncology (ulises with Oncology) to decrease.  Hypoglycemia protocol.  Consider oral agent but given advanced age and steroid use his A1C is actually near goal (<8).      - Will have RN do diabetic teaching and will have outpatient diabetic counseling/education when discharged from TCU.    - Initiated prednisone taper (originally placed on prednisone by oncology for previous cancer treatment) as above.   - Consider additional treatment options to improve at home diabetes management -- next reasonable step would be either SGLT 2 or a GLP-1.  However, given upcoming chemotherapy -- consider insulin coverage, especially if PO status will be variable due to anticipated side effects (e.g., nausea/vomiting/decreased PO).  Continue NPH for now.      HTN  HLD  - Continue pta amlodipine  - Held statin while hospitalized. Resume at discharge.     Anxiety   - Continue pta ativan PRN, zoloft and cymbalta        Diet: Regular Diet Adult  Diet    DVT Prophylaxis: Enoxaparin (Lovenox) SQ  Gorman Catheter: Not present  Lines: None     Cardiac Monitoring: None  Code Status: Full Code      Clinically Significant Risk Factors Present on Admission        # Hypokalemia: Lowest K = 3.3 mmol/L in last 2 days, will replace as needed     # Hypomagnesemia: Lowest Mg = 1.6 mg/dL in last 2 days, will replace as needed   # Hypoalbuminemia: Lowest albumin = 3.2 g/dL at 9/6/2023  6:09 AM, will monitor as appropriate         # DMII: A1C = 8.4 % (Ref range: <5.7 %) within past 6 months    # Overweight: Estimated body mass  "index is 27.27 kg/m  as calculated from the following:    Height as of this encounter: 1.702 m (5' 7.01\").    Weight as of this encounter: 79 kg (174 lb 2.6 oz).              Disposition Plan  per clinical course.  TCU 9/7/23.     Expected Discharge Date: 09/06/2023, 12:00 PM              The patient's care was discussed with the Bedside Nurse, Care Coordinator/, Patient, and Patient's Family.    MOHAN Lock APRN CNP  Hospitalist Service  [facility]  Securely message with [employer] (more info)  Text page via [facility] Paging/Directory   ________________________________________________________________  \"I was present with the student who participated in the service and in the documentation of the note. I have verified the history and personally performed the physical exam and medical decision-making. I agree with the assessment and plan of care as documented in the note.\"     SANDRA Timmons CNP      Interval History   Patient feeling well. Was set for discharge to TCU today at [facility], but was recently informed that the facility could no longer accept him for today. Other referrals pending. Plan on possible discharge to [facility] tomorrow pending bed availability at that time.    Physical Exam   Vital Signs: Temp: 97.4  F (36.3  C) Temp src: Oral BP: 112/72 Pulse: 81   Resp: 16 SpO2: 98 % O2 Device: None (Room air)    Weight: 174 lbs 2.61 oz    General Appearance:  Alert & attentive, interactive & pleasant, NAD  Respiratory: Regular rate & rhythm of breathing, non-labored, CTA bilaterally  Cardiovascular: RRR, normal S1 & S2 without RMCG, DP pulses 2+ bilaterally  GI: Normoactive bowel sounds, abdomen soft & non-tender to palpation  Skin: Warm & dry, no lower extremity edema, redness, or tenderness to palpation      Medical Decision Making       45 MINUTES SPENT BY ME on the date of service doing chart review, history, exam, documentation & further activities per the " note.      Data     I have personally reviewed the following data over the past 24 hrs:    5.7  \   12.1 (L)   / 168     136 102 9.3 /  164 (H)   3.3 (L) 24 0.46 (L) \     ALT: 22 AST: 22 AP: 102 TBILI: 0.2   ALB: 3.2 (L) TOT PROTEIN: 6.3 (L) LIPASE: N/A       Imaging results reviewed over the past 24 hrs:   No results found for this or any previous visit (from the past 24 hour(s)).

## 2023-09-06 NOTE — PROGRESS NOTES
Care Management Discharge Note    Discharge Date: 09/07/2023       Discharge Disposition: Skilled Nursing Facility    Discharge Services: None    Discharge DME: None    Discharge Transportation:      Private pay costs discussed: Not applicable    Does the patient's insurance plan have a 3 day qualifying hospital stay waiver?  Yes     PAS Confirmation Code: [id number]  Patient/family educated on Medicare website which has current facility and service quality ratings: yes    Education Provided on the Discharge Plan:  TCU services   Persons Notified of Discharge Plans: TCU, provider, wife, patient   Patient/Family in Agreement with the Plan: yes    Handoff Referral Completed: No    Additional Information:   updated  patient that he will discharge tomorrow and the ride is around [id number]. Patient gave  permission to update wife.     Updated TCU.     SINGH Ribeiro, LGSW  Emergency Room   Please contact the  on the floor in which the patient is staying for any questions or concerns

## 2023-09-06 NOTE — PLAN OF CARE
"PRIMARY DIAGNOSIS: GENERALIZED WEAKNESS    OUTPATIENT/OBSERVATION GOALS TO BE MET BEFORE DISCHARGE  1. Orthostatic performed: N/A    2. Tolerating PO medications: Yes    3. Return to near baseline physical activity: No    4. Cleared for discharge by consultants (if involved): No    Discharge Planner Nurse   Safe discharge environment identified: Yes  Barriers to discharge: Yes       Entered by: Patricia Scruggs RN 09/06/2023   /72   Pulse 81   Temp 97.4  F (36.3  C) (Oral)   Resp 16   Ht 1.702 m (5' 7.01\")   Wt 79 kg (174 lb 2.6 oz)   SpO2 98%   BMI 27.27 kg/m      Please review provider order for any additional goals.   Nurse to notify provider when observation goals have been met and patient is ready for discharge.Goal Outcome Evaluation:                        "

## 2023-09-06 NOTE — PLAN OF CARE
"PRIMARY DIAGNOSIS: GENERALIZED WEAKNESS    OUTPATIENT/OBSERVATION GOALS TO BE MET BEFORE DISCHARGE  /80 (BP Location: Right arm)   Pulse 89   Temp 98.5  F (36.9  C) (Oral)   Resp 18   Ht 1.702 m (5' 7.01\")   Wt 79 kg (174 lb 2.6 oz)   SpO2 96%   BMI 27.27 kg/m     1. Orthostatic performed: No    2. Tolerating PO medications: Yes    3. Return to near baseline physical activity: No      Discharge Planner Nurse   Safe discharge environment identified: Yes  Barriers to discharge: Yes       Entered by: Cody Woodson RN    Pt A&O with some confusion. VSS on RA. Pt denied any pain or discomfort. External catheter in place. Will continue with POC.    Please review provider order for any additional goals.   Nurse to notify provider when observation goals have been met and patient is ready for discharge.  "

## 2023-09-06 NOTE — PLAN OF CARE
"PRIMARY DIAGNOSIS: GENERALIZED WEAKNESS    OUTPATIENT/OBSERVATION GOALS TO BE MET BEFORE DISCHARGE  /77 (BP Location: Right arm)   Pulse 82   Temp 97.9  F (36.6  C) (Oral)   Resp 16   Ht 1.702 m (5' 7.01\")   Wt 79 kg (174 lb 2.6 oz)   SpO2 95%   BMI 27.27 kg/m     1. Orthostatic performed: No    2. Tolerating PO medications: Yes    3. Return to near baseline physical activity: No      Discharge Planner Nurse   Safe discharge environment identified: Yes  Barriers to discharge: Yes       Entered by: Cody Woodson, RN    Pt A&O with some confusion. VSS on RA. Pt denied any pain or discomfort. External catheter in place. Plan- meeting with primary oncologist in am. [facility]   Please review provider order for any additional goals.   Nurse to notify provider when observation goals have been met and patient is ready for discharge.  "

## 2023-09-06 NOTE — PROGRESS NOTES
Care Management Follow Up    Length of Stay (days): 0    Expected Discharge Date: 09/06/2023     Concerns to be Addressed: discharge planning       Patient plan of care discussed at interdisciplinary rounds: Yes    Anticipated Discharge Disposition: Skilled Nursing Facility     Anticipated Discharge Services: None  Anticipated Discharge DME: None    Patient/family educated on Medicare website which has current facility and service quality ratings: yes  Education Provided on the Discharge Plan:    Patient/Family in Agreement with the Plan: yes    Referrals Placed by CM/SW:  TCU  Private pay costs discussed: transportation costs    Additional Information:  [place] [facility] can no longer take patient today. Patient can admit to [place] [facility] tomorrow. Patient is medically ready to discharge today and is OBS.     Referrals pending at [facility] and [facility]. [facility] referral sent and is reviewing.     Will need to update SLL and  BCBS auth.     Updated provider.     SINGH Ribeiro, LGSW  Emergency Room   Please contact the SW on the floor in which the patient is staying for any questions or concerns

## 2023-09-06 NOTE — PROGRESS NOTES
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  Tentative plan is to start Taxotere chemotherapy later this week  Altered mental status, improved, back to baseline, likely not prostate cancer related  Weakness, drastic decline in LE strength over the past week.  Mobility was not an issue at all a week ago.  Deconditioning vs. Cancer related.  No spinal cord involvement.  There is pachymeningeal enhancement underlying the left temporal calvarial lesion.  Leptomeningeal disease cannot be excluded but unlikely.     Plan  -Glad to see his mental status is back to baseline and appetite is good.  No new pain.  Will see if his lower body strength improves over the next 1-2 weeks  - Agree with discharge to TCU  - Will tentatively delay initiation of Taxotere to 2 weeks from now.     5 diabetes mellitus  -Poorly controlled.  -Patient was taking metformin but not checking blood sugars does not have a primary care physician managing diabetes.  Plan   -Not sure if this had an impact on his altered mental status, fatigue, and weakness.  Blood sugar is now under much better control.  Defer to hospitalist for management.     Chris Damon M.D.  [facility]  [phone]    Subjective:    Arash's mental status has returned to baseline.  He does not report any new pain.  Appetite is good.  Still weak.  Required 2 person assist to get out of bed yesterday.      Labs:  All labs reviewed    CBC  Recent Labs   Lab 09/06/23  0609 09/03/23  1712   WBC 5.7 6.2   HGB 12.1* 12.0*   MCV 92 92    187       CMP  Recent Labs   Lab 09/06/23  1732 09/06/23  1502 09/06/23  1207 09/06/23  0721 09/06/23  0609 09/04/23  1548 09/03/23  1712   NA  --   --   --   --  136  --  140   POTASSIUM  --  4.9  --   --  3.3*  --  3.5   CHLORIDE  --   --   --   --  102  --  101   CO2  --    --   --   --  24  --  28   ANIONGAP  --   --   --   --  10  --  11   *  --  280* 164* 151*   < > 260*   BUN  --   --   --   --  9.3  --  11.4   CR  --   --   --   --  0.46*  --  0.65*   GFRESTIMATED  --   --   --   --  >90  --  >90   MARKELL  --   --   --   --  9.3  --  9.4   MAG  --   --   --   --  1.6*  --   --    PROTTOTAL  --   --   --   --  6.3*  --  6.7   ALBUMIN  --   --   --   --  3.2*  --  3.7   BILITOTAL  --   --   --   --  0.2  --  0.4   ALKPHOS  --   --   --   --  102  --  110   AST  --   --   --   --  22  --  19   ALT  --   --   --   --  22  --  19    < > = values in this interval not displayed.       INRNo lab results found in last 7 days.    Blood CultureNo results for input(s): CULT in the last 168 hours.      Chris Damon MD  [facility]  9/6/2023 6:23 PM

## 2023-09-07 ENCOUNTER — LAB REQUISITION (OUTPATIENT)
Dept: LAB | Facility: CLINIC | Age: 78
End: 2023-09-07
Payer: COMMERCIAL

## 2023-09-07 VITALS
SYSTOLIC BLOOD PRESSURE: 104 MMHG | HEIGHT: 67 IN | DIASTOLIC BLOOD PRESSURE: 64 MMHG | HEART RATE: 86 BPM | RESPIRATION RATE: 18 BRPM | WEIGHT: 174.16 LBS | TEMPERATURE: 98.3 F | BODY MASS INDEX: 27.34 KG/M2 | OXYGEN SATURATION: 95 %

## 2023-09-07 DIAGNOSIS — C79.51 PROSTATE CANCER METASTATIC TO BONE (H): ICD-10-CM

## 2023-09-07 DIAGNOSIS — U07.1 COVID-19: ICD-10-CM

## 2023-09-07 DIAGNOSIS — Z11.1 ENCOUNTER FOR SCREENING FOR RESPIRATORY TUBERCULOSIS: ICD-10-CM

## 2023-09-07 DIAGNOSIS — C61 PROSTATE CANCER METASTATIC TO BONE (H): ICD-10-CM

## 2023-09-07 LAB
GLUCOSE BLDC GLUCOMTR-MCNC: 153 MG/DL (ref 70–99)
GLUCOSE BLDC GLUCOMTR-MCNC: 156 MG/DL (ref 70–99)
MAGNESIUM SERPL-MCNC: 1.5 MG/DL (ref 1.7–2.3)
POTASSIUM SERPL-SCNC: 4.1 MMOL/L (ref 3.4–5.3)

## 2023-09-07 PROCEDURE — 82962 GLUCOSE BLOOD TEST: CPT

## 2023-09-07 PROCEDURE — 250N000012 HC RX MED GY IP 250 OP 636 PS 637: Performed by: NURSE PRACTITIONER

## 2023-09-07 PROCEDURE — 83735 ASSAY OF MAGNESIUM: CPT | Performed by: NURSE PRACTITIONER

## 2023-09-07 PROCEDURE — G0378 HOSPITAL OBSERVATION PER HR: HCPCS

## 2023-09-07 PROCEDURE — 84132 ASSAY OF SERUM POTASSIUM: CPT | Performed by: NURSE PRACTITIONER

## 2023-09-07 PROCEDURE — 87635 SARS-COV-2 COVID-19 AMP PRB: CPT

## 2023-09-07 PROCEDURE — 250N000013 HC RX MED GY IP 250 OP 250 PS 637: Performed by: NURSE PRACTITIONER

## 2023-09-07 PROCEDURE — 250N000013 HC RX MED GY IP 250 OP 250 PS 637

## 2023-09-07 PROCEDURE — 99239 HOSP IP/OBS DSCHRG MGMT >30: CPT | Performed by: NURSE PRACTITIONER

## 2023-09-07 PROCEDURE — 36415 COLL VENOUS BLD VENIPUNCTURE: CPT | Performed by: NURSE PRACTITIONER

## 2023-09-07 RX ORDER — OXYCODONE HYDROCHLORIDE 5 MG/1
5 TABLET ORAL ONCE
Qty: 1 TABLET | Refills: 0 | Status: SHIPPED | OUTPATIENT
Start: 2023-09-07 | End: 2023-09-08

## 2023-09-07 RX ADMIN — INSULIN HUMAN 5 UNITS: 100 INJECTION, SUSPENSION SUBCUTANEOUS at 08:17

## 2023-09-07 RX ADMIN — MAGNESIUM OXIDE TAB 400 MG (241.3 MG ELEMENTAL MG) 400 MG: 400 (241.3 MG) TAB at 07:36

## 2023-09-07 RX ADMIN — OXYCODONE HYDROCHLORIDE 5 MG: 5 TABLET ORAL at 02:30

## 2023-09-07 RX ADMIN — DULOXETINE HYDROCHLORIDE 20 MG: 20 CAPSULE, DELAYED RELEASE ORAL at 07:36

## 2023-09-07 RX ADMIN — INSULIN ASPART 1 UNITS: 100 INJECTION, SOLUTION INTRAVENOUS; SUBCUTANEOUS at 08:14

## 2023-09-07 RX ADMIN — PREDNISONE 5 MG: 5 TABLET ORAL at 07:36

## 2023-09-07 RX ADMIN — SERTRALINE HYDROCHLORIDE 100 MG: 100 TABLET ORAL at 07:36

## 2023-09-07 RX ADMIN — OXYCODONE HYDROCHLORIDE 5 MG: 5 TABLET ORAL at 07:37

## 2023-09-07 RX ADMIN — AMLODIPINE BESYLATE 5 MG: 5 TABLET ORAL at 07:36

## 2023-09-07 ASSESSMENT — ACTIVITIES OF DAILY LIVING (ADL)
ADLS_ACUITY_SCORE: 32
ADLS_ACUITY_SCORE: 31
ADLS_ACUITY_SCORE: 32
ADLS_ACUITY_SCORE: 30
ADLS_ACUITY_SCORE: 32
ADLS_ACUITY_SCORE: 31

## 2023-09-07 NOTE — PLAN OF CARE
"PRIMARY DIAGNOSIS: GENERALIZED WEAKNESS    OUTPATIENT/OBSERVATION GOALS TO BE MET BEFORE DISCHARGE  1. Orthostatic performed: N/A    2. Tolerating PO medications: Yes    3. Return to near baseline physical activity: YES    4. Cleared for discharge by consultants (if involved): YES    Discharge Planner Nurse   Safe discharge environment identified: YES  Barriers to discharge: NO    Entered by: Ashley Orr RN 09/06/2023     /63 (BP Location: Left arm)   Pulse 88   Temp 97.5  F (36.4  C) (Oral)   Resp 16   Ht 1.702 m (5' 7.01\")   Wt 79 kg (174 lb 2.6 oz)   SpO2 95%   BMI 27.27 kg/m      Please review provider order for any additional goals.   Nurse to notify provider when observation goals have been met and patient is ready for discharge.Goal Outcome Evaluation:                  "

## 2023-09-07 NOTE — PLAN OF CARE
Physical Therapy Discharge Summary     Reason for therapy discharge:    Discharged to transitional care facility.     Progress towards therapy goal(s). See goals on Care Plan in [facility] electronic health record for goal details.  Goals not met.  Barriers to achieving goals:  discharge to TCU   Therapy recommendation(s):    Continued therapy is recommended.  Rationale/Recommendations:  Pt is below baseline with functional mobility and strength and would benefit from continued PT to progress skills.

## 2023-09-07 NOTE — PLAN OF CARE
"PRIMARY DIAGNOSIS: GENERALIZED WEAKNESS    OUTPATIENT/OBSERVATION GOALS TO BE MET BEFORE DISCHARGE  1. Orthostatic performed: N/A    2. Tolerating PO medications: Yes    3. Return to near baseline physical activity: No    4. Cleared for discharge by consultants (if involved): No    Discharge Planner Nurse   Safe discharge environment identified: Yes  Barriers to discharge: Yes       Entered by: Patricia Scruggs RN 09/07/2023       /64 (BP Location: Left arm)   Pulse 86   Temp 98.3  F (36.8  C) (Axillary)   Resp 18   Ht 1.702 m (5' 7.01\")   Wt 79 kg (174 lb 2.6 oz)   SpO2 95%   BMI 27.27 kg/m    Patient A&O x4. Up with one assist using walker/Gb. Tolerating diet. Pain 5/10 prn oxycodone given. Plan to discharge today [facility] via [facility]  transfort 10:45. Plan of care ongoing.   Please review provider order for any additional goals.   Nurse to notify provider when observation goals have been met and patient is ready for discharge.Goal Outcome Evaluation:                        "

## 2023-09-07 NOTE — PROGRESS NOTES
New patient in TCU, awaiting oxycodone refill. Nursing requesting to pull from [facility].    Orders  Migue Beach  1945     Oxycodone 5 mg one time today now, ok to pull from [facility]      SANDRA Harry CNP on 9/7/2023 at 2:03 PM

## 2023-09-07 NOTE — PROGRESS NOTES
Care Management Discharge Note    Discharge Date: 09/07/2023       Discharge Disposition: Skilled Nursing Facility    Discharge Services: None    Discharge DME: None    Discharge Transportation:      Private pay costs discussed: transportation costs and insurance costs out of pocket expenses    Does the patient's insurance plan have a 3 day qualifying hospital stay waiver?  Yes   Will the waiver be used for post-acute placement? Yes    PAS Confirmation Code: [id number]  Patient/family educated on Medicare website which has current facility and service quality ratings: yes    Education Provided on the Discharge Plan:    Persons Notified of Discharge Plans: Patient, provider, wife   Patient/Family in Agreement with the Plan: yes    Handoff Referral Completed: No    Additional Information:  Faxed scripts to [facility] and updated them on ride time. Will send orders when they are in.     W/C ride coming around 1045.     SINGH Ribeiro, LGSW  Emergency Room   Please contact the SW on the floor in which the patient is staying for any questions or concerns

## 2023-09-07 NOTE — PLAN OF CARE
"PRIMARY DIAGNOSIS: GENERALIZED WEAKNESS    OUTPATIENT/OBSERVATION GOALS TO BE MET BEFORE DISCHARGE  1. Orthostatic performed: N/A    2. Tolerating PO medications: Yes    3. Return to near baseline physical activity: YES    4. Cleared for discharge by consultants (if involved): YES    Discharge Planner Nurse   Safe discharge environment identified: YES  Barriers to discharge: NO    Entered by: Ashley Orr RN 09/07/2023   Pt alert & oriented with some confusion.Rates pain 6/10. Oxycodone administered, VSS, Assist x 1 with mobility. Bgs 186  & 156 this shift..Medically ready for discharge. Expected discharge to [facility] TCU  today between 4433-6433 hrs via [facility] transportation.     /75 (BP Location: Right arm)   Pulse 87   Temp 98  F (36.7  C) (Oral)   Resp 20   Ht 1.702 m (5' 7.01\")   Wt 79 kg (174 lb 2.6 oz)   SpO2 97%   BMI 27.27 kg/m      Please review provider order for any additional goals.   Nurse to notify provider when observation goals have been met and patient is ready for discharge.Goal Outcome Evaluation:                  "

## 2023-09-07 NOTE — PLAN OF CARE
"PRIMARY DIAGNOSIS: GENERALIZED WEAKNESS    OUTPATIENT/OBSERVATION GOALS TO BE MET BEFORE DISCHARGE  1. Orthostatic performed: N/A    2. Tolerating PO medications: Yes    3. Return to near baseline physical activity: YES    4. Cleared for discharge by consultants (if involved): YES    Discharge Planner Nurse   Safe discharge environment identified: YES  Barriers to discharge: NO    Entered by: Ashley Orr RN 09/07/2023   Pt alert & oriented with some confusion.Rates pain 1-2/10. VSS, Assist x 1 with mobility. Bgs 186 @ Bedtime.Medically ready for discharge. Expected discharge to [facility] TCU later today..     /84 (BP Location: Right arm)   Pulse 82   Temp 98.4  F (36.9  C) (Oral)   Resp 18   Ht 1.702 m (5' 7.01\")   Wt 79 kg (174 lb 2.6 oz)   SpO2 97%   BMI 27.27 kg/m      Please review provider order for any additional goals.   Nurse to notify provider when observation goals have been met and patient is ready for discharge.Goal Outcome Evaluation:                  "

## 2023-09-08 ENCOUNTER — TRANSITIONAL CARE UNIT VISIT (OUTPATIENT)
Dept: GERIATRICS | Facility: CLINIC | Age: 78
End: 2023-09-08
Payer: COMMERCIAL

## 2023-09-08 VITALS
SYSTOLIC BLOOD PRESSURE: 92 MMHG | OXYGEN SATURATION: 97 % | TEMPERATURE: 98.2 F | BODY MASS INDEX: 27.15 KG/M2 | WEIGHT: 173 LBS | HEART RATE: 100 BPM | HEIGHT: 67 IN | DIASTOLIC BLOOD PRESSURE: 59 MMHG | RESPIRATION RATE: 18 BRPM

## 2023-09-08 DIAGNOSIS — R41.89 COGNITIVE IMPAIRMENT: ICD-10-CM

## 2023-09-08 DIAGNOSIS — E78.5 DYSLIPIDEMIA: ICD-10-CM

## 2023-09-08 DIAGNOSIS — E11.69 TYPE 2 DIABETES MELLITUS WITH OTHER SPECIFIED COMPLICATION, WITHOUT LONG-TERM CURRENT USE OF INSULIN (H): ICD-10-CM

## 2023-09-08 DIAGNOSIS — F32.A DEPRESSION, UNSPECIFIED DEPRESSION TYPE: ICD-10-CM

## 2023-09-08 DIAGNOSIS — C79.51 PROSTATE CANCER METASTATIC TO BONE (H): Primary | ICD-10-CM

## 2023-09-08 DIAGNOSIS — D64.9 ANEMIA, UNSPECIFIED TYPE: ICD-10-CM

## 2023-09-08 DIAGNOSIS — R53.81 PHYSICAL DECONDITIONING: ICD-10-CM

## 2023-09-08 DIAGNOSIS — I10 BENIGN ESSENTIAL HYPERTENSION: ICD-10-CM
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
## 2023-09-08 DIAGNOSIS — F41.9 ANXIETY: ICD-10-CM

## 2023-09-08 DIAGNOSIS — C61 PROSTATE CANCER METASTATIC TO BONE (H): Primary | ICD-10-CM

## 2023-09-08 DIAGNOSIS — K59.01 SLOW TRANSIT CONSTIPATION: ICD-10-CM

## 2023-09-08 LAB — SARS-COV-2 RNA RESP QL NAA+PROBE: NEGATIVE

## 2023-09-08 PROCEDURE — P9603 ONE-WAY ALLOW PRORATED MILES: HCPCS

## 2023-09-08 PROCEDURE — 99310 SBSQ NF CARE HIGH MDM 45: CPT

## 2023-09-08 PROCEDURE — 36415 COLL VENOUS BLD VENIPUNCTURE: CPT

## 2023-09-08 PROCEDURE — 86481 TB AG RESPONSE T-CELL SUSP: CPT

## 2023-09-08 RX ORDER — OXYCODONE HYDROCHLORIDE 5 MG/1
TABLET ORAL
Qty: 20 TABLET | Refills: 0 | Status: SHIPPED | OUTPATIENT
Start: 2023-09-08 | End: 2023-01-01 | Stop reason: DRUGHIGH

## 2023-09-08 RX ORDER — AMOXICILLIN 250 MG
2 CAPSULE ORAL DAILY
COMMUNITY

## 2023-09-08 NOTE — LETTER
9/8/2023        RE: Migue Beach  [address]  [place] [address]        [facility] GERIATRICS    PRIMARY CARE PROVIDER AND CLINIC:  Kalpesh Dey MD, [address] / [place] [address]  Chief Complaint   Patient presents with     Hospital F/U      [place] Medical Record Number:  [id number]  Place of Service where encounter took place:  [facility]  ([facility]) [[id number]]    Migue Beach  is a 77 year old  (1945), admitted to the above facility from  [facility]. Hospital stay 9/3/23 through 9/7/23.    By chart review, patient presented to the ED with confusion.  Family reported increased fatigue and weakness over the last month culminating in an several falls 8/30.  He has a history of prostate cancer with metastasis on opiates for chronic back pain.  In ED, work-up remarkable for glucose 216, creatinine 0.65, white 0.2, hemoglobin 12.  UA was unremarkable.  CT demonstrated presumed chronic microvascular changes, no acute intracranial process.  MRI of the brain with numerous osseous metastasis and patchy meningeal enhancement underlying the left parietal calvarial lesion, reactive versus meningeal spread of disease.  Chest x-ray without acute cardiopulmonary findings.  Thoracic and lumbar MRI demonstrated widespread osseous metastasis, 5 mm intradural extramedullary lesion at T9-10 and left supraclavicular and retroperitoneal metastasis.  He was seen by oncology and therapy team, recommended TCU to improve strength before starting an alternative regimen.  Prednisone was decreased to 5 mg daily.  A1c was elevated at 8.4 and he was started on NPH.  Of note discharge summary was not available at the time of visit.    HPI:    Seen today in his room in U, wife Blanca present.  He does not recall history leading up to his hospitalization, somewhat limited historian, reports his oncologist newly started him on metformin.  Currently feeling well,  notes ongoing pain in his back, oxycodone is effective and he has been constipated and receiving senna.  He is denying chest pain, shortness of breath, changes in bowel or bladder habits, fever/chills.  Nursing reports systolic blood pressure in 90s this morning, patient was asymptomatic.  Also reporting urinary urgency.    CODE STATUS/ADVANCE DIRECTIVES DISCUSSION:  Full Code  CPR/Full code   ALLERGIES: No Known Allergies   PAST MEDICAL HISTORY:   Past Medical History:   Diagnosis Date     Depressive disorder     anxiety     Hypertension       PAST SURGICAL HISTORY:   has a past surgical history that includes colonoscopy.  FAMILY HISTORY: family history is not on file.  SOCIAL HISTORY:   reports that he has quit smoking. He does not have any smokeless tobacco history on file. He reports current alcohol use. He reports that he does not use drugs.  Patient's living condition: lives with spouse    Post Discharge Medication Reconciliation Status:   MED REC REQUIRED  Post Medication Reconciliation Status:  Discharge medications reconciled and changed, see notes/orders       Current Outpatient Medications   Medication Sig     acetaminophen (TYLENOL) 325 MG tablet Take 2 tablets (650 mg) by mouth every 6 hours as needed for mild pain or other (and adjunct with moderate or severe pain or per patient request)     amLODIPine (NORVASC) 5 MG tablet Take 1 tablet (5 mg) by mouth daily     diphenhydrAMINE-zinc acetate (BENADRYL) 1-0.1 % external cream Apply topically 3 times daily as needed for itching     DULoxetine (CYMBALTA) 20 MG capsule Take 20 mg by mouth 2 times daily     enoxaparin ANTICOAGULANT (LOVENOX) 40 MG/0.4ML syringe Inject 0.4 mLs (40 mg) Subcutaneous every 24 hours     glucose 40 % (400 mg/mL) gel Take 15-30 g by mouth every 15 minutes as needed for low blood sugar (for blood sugar <60 mg/dl)     insulin aspart (NOVOLOG PEN) 100 UNIT/ML pen Inject 1-7 Units Subcutaneous 3 times daily (before meals) Correction  Scale - MEDIUM INSULIN RESISTANCE DOSING     Do Not give Correction Insulin if Pre-Meal BG less than 140.   For Pre-Meal  - 189 give 1 unit.   For Pre-Meal  - 239 give 2 units.   For Pre-Meal  - 289 give 3 units.   For Pre-Meal  - 339 give 4 units.   For Pre-Meal - 399 give 5 units.   For Pre-Meal -449 give 6 units  For Pre-Meal BG greater than or equal to 450 give 7 units.   To be given with prandial insulin, and based on pre-meal blood glucose.    Notify provider if glucose greater than or equal to 350 mg/dL after administration of correction dose.  If given at mealtime, administer within 30 minutes of start of meal.     insulin aspart (NOVOLOG PEN) 100 UNIT/ML pen Inject 1-5 Units Subcutaneous At Bedtime MEDIUM INSULIN RESISTANCE DOSING    Do Not give Bedtime Correction Insulin if BG less than  200.   For  - 249 give 1 units.   For  - 299 give 2 units.   For  - 349 give 3 units.   For  -399 give 4 units.   For BG greater than or equal to 400 give 5 units.  Notify provider if glucose greater than or equal to 350 mg/dL after administration of correction dose.  If given at mealtime, administer within 30 minutes of start of meal.     insulin  UNIT/ML injection Inject 5 Units Subcutaneous every morning (before breakfast)     LORazepam (ATIVAN) 1 MG tablet Take 0.5 tablets (0.5 mg) by mouth every 6 hours as needed for anxiety, nausea or pain     magnesium oxide (MAG-OX) 400 MG tablet Take 1 tablet (400 mg) by mouth daily     melatonin 1 MG TABS tablet Take 3 tablets (3 mg) by mouth nightly as needed for sleep     metFORMIN (GLUCOPHAGE XR) 500 MG 24 hr tablet Take 1,000 mg by mouth daily (with dinner)     ondansetron (ZOFRAN ODT) 4 MG ODT tab Take 1 tablet (4 mg) by mouth every 6 hours as needed for nausea or vomiting     predniSONE (DELTASONE) 5 MG tablet Take 1 tablet a day for the next 7 days then 1 tablet every other day for 7 days then off.      "rosuvastatin (CRESTOR) 20 MG tablet Take 20 mg by mouth daily     senna-docusate (SENOKOT-S/PERICOLACE) 8.6-50 MG tablet Take 1 tablet by mouth 2 times daily as needed for constipation     sertraline (ZOLOFT) 100 MG tablet Take 100 mg by mouth daily     No current facility-administered medications for this visit.       ROS:  Limited secondary to cognitive impairment but today pt reports the above    Vitals:  BP 92/59   Pulse 100   Temp 98.2  F (36.8  C)   Resp 18   Ht 1.702 m (5' 7\")   Wt 78.5 kg (173 lb)   SpO2 97%   BMI 27.10 kg/m    Exam:  GENERAL APPEARANCE:  Alert, in no distress, appears healthy  RESP:  respiratory effort and palpation of chest normal, lungs clear to auscultation , no respiratory distress  CV:  Palpation and auscultation of heart done , regular rate and rhythm, no murmur, rub, or gallop, no edema  ABDOMEN:  normal bowel sounds, soft, nontender, no hepatosplenomegaly or other masses  M/S:   Gait and station abnormal transfers with assist  SKIN:  Inspection of skin and subcutaneous tissue baseline, Palpation of skin and subcutaneous tissue baseline  NEURO:   Moves all extremities freely, follows simple commands  PSYCH:  insight and judgement impaired, memory impaired , affect and mood normal, oriented x2-3    Lab/Diagnostic data:  Labs done in SNF are in [place] [facility]. Please refer to them using [employer] Everywhere. and Recent labs in EPIC reviewed by me today.     ASSESSMENT/PLAN:    (C61,  C79.51) Prostate cancer metastatic to bone (H)  (primary encounter diagnosis)  (R53.81) Physical deconditioning  (W19.XXXD) Fall, subsequent encounter  Comment: Chronic prostate cancer with widespread metastasis to the spine and skull, specifically left temporal bone.  Recent progression while on Zytiga/prednisone.  Progressive deconditioning and physical decline resulting in several falls outpatient, CT of the head without acute findings.  In TCU for rehab with plan to start Taxotere in " approximately 2 weeks  Plan: Continue prednisone 5 mg daily, PT/OT, follow-up with oncology per recommendations.  As needed Tylenol and oxycodone for pain.    (E11.69) Type 2 diabetes mellitus with other specified complication, without long-term current use of insulin (H)  Comment: Chronic, A1c 8.4.  Started on NPH with PTA metformin.  There was consideration of SGLT2 or GLP-1 but given pending chemotherapy opted for insulin which can be more easily held in the setting of poor oral intakes.  Prednisone decreased 10>5 mg daily.   Plan: Monitor blood glucose 4 times daily, metformin 1000 mg daily, NPH 5 units daily with aspart sliding scale.     (I10) Benign essential hypertension  Comment: Chronic, mildly hypertensive on hospital admission, readings in TCU normal low.  Will stop PTA amlodipine, avoid hypotension with risk for falls. Consider AM cortisol with prednisone taper?  BP Readings from Last 3 Encounters:   09/08/23 92/59   09/07/23 104/64   03/01/23 135/86   Plan: Discontinue amlodipine, monitor BP off of antihypertensives with adjustments as needed.  BMP 9/11    (E78.5) Dyslipidemia  Comment: Chronic  Plan: Continue rosuvastatin    (F41.9) Anxiety  (F32.A) Depression, unspecified depression type  Comment: Chronic, mood okay  Plan: Continue sertraline 100 mg daily, Cymbalta 20 mg twice daily, melatonin for sleep, ativan PRN for anxiety, monitor mood, behaviors, sleep habits    (R41.89) Cognitive impairment  Comment: Slums 9/30 today with OT, scored lower than anticipated based on exam.  Wife Blanca acts as an independent historian today  Plan: SNF for assist with ADLs, medication management, meals, activities.  OT for formal discharge recommendations    (D64.9) Anemia, unspecified type  Comment: Chronic, mild  Plan: CBC 9/11, monitor hemoglobin periodically    (K59.01) Slow transit constipation  Comment: Chronic, opiate use and immobility contributing  Plan: Start senna S1 tab daily, continue twice daily as  needed    DVT prophylaxis: Discharged on lovenox. Continue for now, monitor CBC, can discontinue when more mobile    Orders:  1.  CBC, BMP 9/11  2.  Discontinue amlodipine    Total time spent with patient visit at the skilled nursing facility was 65 minutes including patient visit, review of past records, and visit with patient contact, reviewed management with nursing facility staff.     Electronically signed by:  SANDRA Harry CNP                   Sincerely,        SANDRA Harry CNP

## 2023-09-08 NOTE — PROGRESS NOTES
[facility] GERIATRICS    PRIMARY CARE PROVIDER AND CLINIC:  Kalpesh Dey MD, [address] / [place] [id number]  Chief Complaint   Patient presents with    Hospital F/U      [place] Medical Record Number:  [id number]  Place of Service where encounter took place:  [facility]  ([facility]) [[id number]]    Migue Beach  is a 77 year old  (1945), admitted to the above facility from  [facility]. Hospital stay 9/3/23 through 9/7/23.    By chart review, patient presented to the ED with confusion.  Family reported increased fatigue and weakness over the last month culminating in an several falls 8/30.  He has a history of prostate cancer with metastasis on opiates for chronic back pain.  In ED, work-up remarkable for glucose 216, creatinine 0.65, white 0.2, hemoglobin 12.  UA was unremarkable.  CT demonstrated presumed chronic microvascular changes, no acute intracranial process.  MRI of the brain with numerous osseous metastasis and patchy meningeal enhancement underlying the left parietal calvarial lesion, reactive versus meningeal spread of disease.  Chest x-ray without acute cardiopulmonary findings.  Thoracic and lumbar MRI demonstrated widespread osseous metastasis, 5 mm intradural extramedullary lesion at T9-10 and left supraclavicular and retroperitoneal metastasis.  He was seen by oncology and therapy team, recommended TCU to improve strength before starting an alternative regimen.  Prednisone was decreased to 5 mg daily.  A1c was elevated at 8.4 and he was started on NPH.  Of note discharge summary was not available at the time of visit.    HPI:    Seen today in his room in U, wife Blanca present.  He does not recall history leading up to his hospitalization, somewhat limited historian, reports his oncologist newly started him on metformin.  Currently feeling well, notes ongoing pain in his back, oxycodone is effective and he has been constipated and  receiving senna.  He is denying chest pain, shortness of breath, changes in bowel or bladder habits, fever/chills.  Nursing reports systolic blood pressure in 90s this morning, patient was asymptomatic.  Also reporting urinary urgency.    CODE STATUS/ADVANCE DIRECTIVES DISCUSSION:  Full Code  CPR/Full code   ALLERGIES: No Known Allergies   PAST MEDICAL HISTORY:   Past Medical History:   Diagnosis Date    Depressive disorder     anxiety    Hypertension       PAST SURGICAL HISTORY:   has a past surgical history that includes colonoscopy.  FAMILY HISTORY: family history is not on file.  SOCIAL HISTORY:   reports that he has quit smoking. He does not have any smokeless tobacco history on file. He reports current alcohol use. He reports that he does not use drugs.  Patient's living condition: lives with spouse    Post Discharge Medication Reconciliation Status:   MED REC REQUIRED  Post Medication Reconciliation Status:  Discharge medications reconciled and changed, see notes/orders       Current Outpatient Medications   Medication Sig    acetaminophen (TYLENOL) 325 MG tablet Take 2 tablets (650 mg) by mouth every 6 hours as needed for mild pain or other (and adjunct with moderate or severe pain or per patient request)    amLODIPine (NORVASC) 5 MG tablet Take 1 tablet (5 mg) by mouth daily    diphenhydrAMINE-zinc acetate (BENADRYL) 1-0.1 % external cream Apply topically 3 times daily as needed for itching    DULoxetine (CYMBALTA) 20 MG capsule Take 20 mg by mouth 2 times daily    enoxaparin ANTICOAGULANT (LOVENOX) 40 MG/0.4ML syringe Inject 0.4 mLs (40 mg) Subcutaneous every 24 hours    glucose 40 % (400 mg/mL) gel Take 15-30 g by mouth every 15 minutes as needed for low blood sugar (for blood sugar <60 mg/dl)    insulin aspart (NOVOLOG PEN) 100 UNIT/ML pen Inject 1-7 Units Subcutaneous 3 times daily (before meals) Correction Scale - MEDIUM INSULIN RESISTANCE DOSING     Do Not give Correction Insulin if Pre-Meal BG less  than 140.   For Pre-Meal  - 189 give 1 unit.   For Pre-Meal  - 239 give 2 units.   For Pre-Meal  - 289 give 3 units.   For Pre-Meal  - 339 give 4 units.   For Pre-Meal - 399 give 5 units.   For Pre-Meal -449 give 6 units  For Pre-Meal BG greater than or equal to 450 give 7 units.   To be given with prandial insulin, and based on pre-meal blood glucose.    Notify provider if glucose greater than or equal to 350 mg/dL after administration of correction dose.  If given at mealtime, administer within 30 minutes of start of meal.    insulin aspart (NOVOLOG PEN) 100 UNIT/ML pen Inject 1-5 Units Subcutaneous At Bedtime MEDIUM INSULIN RESISTANCE DOSING    Do Not give Bedtime Correction Insulin if BG less than  200.   For  - 249 give 1 units.   For  - 299 give 2 units.   For  - 349 give 3 units.   For  -399 give 4 units.   For BG greater than or equal to 400 give 5 units.  Notify provider if glucose greater than or equal to 350 mg/dL after administration of correction dose.  If given at mealtime, administer within 30 minutes of start of meal.    insulin  UNIT/ML injection Inject 5 Units Subcutaneous every morning (before breakfast)    LORazepam (ATIVAN) 1 MG tablet Take 0.5 tablets (0.5 mg) by mouth every 6 hours as needed for anxiety, nausea or pain    magnesium oxide (MAG-OX) 400 MG tablet Take 1 tablet (400 mg) by mouth daily    melatonin 1 MG TABS tablet Take 3 tablets (3 mg) by mouth nightly as needed for sleep    metFORMIN (GLUCOPHAGE XR) 500 MG 24 hr tablet Take 1,000 mg by mouth daily (with dinner)    ondansetron (ZOFRAN ODT) 4 MG ODT tab Take 1 tablet (4 mg) by mouth every 6 hours as needed for nausea or vomiting    predniSONE (DELTASONE) 5 MG tablet Take 1 tablet a day for the next 7 days then 1 tablet every other day for 7 days then off.    rosuvastatin (CRESTOR) 20 MG tablet Take 20 mg by mouth daily    senna-docusate (SENOKOT-S/PERICOLACE) 8.6-50  "MG tablet Take 1 tablet by mouth 2 times daily as needed for constipation    sertraline (ZOLOFT) 100 MG tablet Take 100 mg by mouth daily     No current facility-administered medications for this visit.       ROS:  Limited secondary to cognitive impairment but today pt reports the above    Vitals:  BP 92/59   Pulse 100   Temp 98.2  F (36.8  C)   Resp 18   Ht 1.702 m (5' 7\")   Wt 78.5 kg (173 lb)   SpO2 97%   BMI 27.10 kg/m    Exam:  GENERAL APPEARANCE:  Alert, in no distress, appears healthy  RESP:  respiratory effort and palpation of chest normal, lungs clear to auscultation , no respiratory distress  CV:  Palpation and auscultation of heart done , regular rate and rhythm, no murmur, rub, or gallop, no edema  ABDOMEN:  normal bowel sounds, soft, nontender, no hepatosplenomegaly or other masses  M/S:   Gait and station abnormal transfers with assist  SKIN:  Inspection of skin and subcutaneous tissue baseline, Palpation of skin and subcutaneous tissue baseline  NEURO:   Moves all extremities freely, follows simple commands  PSYCH:  insight and judgement impaired, memory impaired , affect and mood normal, oriented x2-3    Lab/Diagnostic data:  Labs done in SNF are in [place] [employer]. Please refer to them using [employer] Everywhere. and Recent labs in [facility] reviewed by me today.     ASSESSMENT/PLAN:    (C61,  C79.51) Prostate cancer metastatic to bone (H)  (primary encounter diagnosis)  (R53.81) Physical deconditioning  (W19.XXXD) Fall, subsequent encounter  Comment: Chronic prostate cancer with widespread metastasis to the spine and skull, specifically left temporal bone.  Recent progression while on Zytiga/prednisone.  Progressive deconditioning and physical decline resulting in several falls outpatient, CT of the head without acute findings.  In TCU for rehab with plan to start Taxotere in approximately 2 weeks  Plan: Continue prednisone 5 mg daily, PT/OT, follow-up with oncology per recommendations.  As needed " Tylenol and oxycodone for pain.    (E11.69) Type 2 diabetes mellitus with other specified complication, without long-term current use of insulin (H)  Comment: Chronic, A1c 8.4.  Started on NPH with PTA metformin.  There was consideration of SGLT2 or GLP-1 but given pending chemotherapy opted for insulin which can be more easily held in the setting of poor oral intakes.  Prednisone decreased 10>5 mg daily.   Plan: Monitor blood glucose 4 times daily, metformin 1000 mg daily, NPH 5 units daily with aspart sliding scale.     (I10) Benign essential hypertension  Comment: Chronic, mildly hypertensive on hospital admission, readings in TCU normal low.  Will stop PTA amlodipine, avoid hypotension with risk for falls. Consider AM cortisol with prednisone taper?  BP Readings from Last 3 Encounters:   09/08/23 92/59   09/07/23 104/64   03/01/23 135/86   Plan: Discontinue amlodipine, monitor BP off of antihypertensives with adjustments as needed.  BMP 9/11    (E78.5) Dyslipidemia  Comment: Chronic  Plan: Continue rosuvastatin    (F41.9) Anxiety  (F32.A) Depression, unspecified depression type  Comment: Chronic, mood okay  Plan: Continue sertraline 100 mg daily, Cymbalta 20 mg twice daily, melatonin for sleep, ativan PRN for anxiety, monitor mood, behaviors, sleep habits    (R41.89) Cognitive impairment  Comment: Slums 9/30 today with OT, scored lower than anticipated based on exam.  Wife Blanca acts as an independent historian today  Plan: SNF for assist with ADLs, medication management, meals, activities.  OT for formal discharge recommendations    (D64.9) Anemia, unspecified type  Comment: Chronic, mild  Plan: CBC 9/11, monitor hemoglobin periodically    (K59.01) Slow transit constipation  Comment: Chronic, opiate use and immobility contributing  Plan: Start senna S1 tab daily, continue twice daily as needed    DVT prophylaxis: Discharged on lovenox. Continue for now, monitor CBC, can discontinue when more  mobile    Orders:  1.  CBC, BMP 9/11  2.  Discontinue amlodipine    Total time spent with patient visit at the skilled nursing facility was 65 minutes including patient visit, review of past records, and visit with patient contact, reviewed management with nursing facility staff.     Electronically signed by:  SANDRA Harry CNP

## 2023-09-08 NOTE — PROGRESS NOTES
[facility]  INITIAL VISIT NOTE  September 11, 2023    PRIMARY CARE PROVIDER AND CLINIC:  Kalpesh Dey [address] / [place] [id number]    CHIEF COMPLAINT:  Hospital follow-up/Initial visit    HPI:    Migue Beach is a 77 year old  (1945) male who was seen at [facility] on September 11, 2023 for an initial visit.     Medical history is notable for metastatic prostate cancer, hypertension, dyslipidemia, DM type II, depression, and anxiety.    Summary of hospital course:  Patient was hospitalized at [facility] from September 3 through September 7, 2023 for generalized weakness and confusion.  EKG showed normal sinus rhythm and RBBB.  Initial work-up was significant for normal electrolytes, creatinine 0.65, lactic acid 1.2, TSH 1.16, B12 427, hemoglobin 12, normal AST/ALT/alkaline phosphatase, white count 6.2, and unremarkable UA.  Screening for COVID-19 was negative.  CT head showed no acute intracranial pathology.  Brain MRI was negative for acute intracranial abnormality or parenchymal metastasis.  There were numerous osseous metastases with pachymeningeal enhancement underlying the left temporal calvarial lesion.  CT chest with contrast showed a few small pulmonary nodules, unchanged, diffuse skeletal metastasis, and numerous enlarged retroperitoneal lymph nodes.  MRI thoracolumbar spine demonstrated widespread osseous metastasis without canal compromise or pathologic fracture as well as 5 mm enhancing intradural extramedullary lesion at T9-T10, with a differential including peripheral nerve sheath tumor such as schwannoma versus meningioma.  It was felt that his symptoms were related to progression of metastatic prostate cancer and possibility of oxycodone contributing to his confusion and weakness.  Insulin was started for poorly controlled diabetes and he was started on enoxaparin for DVT prophylaxis.  TCU was recommended per  therapies.    Patient is admitted to this facility for medical management, nursing care, and rehab.     Of note, history was obtained from patient, facility RN, and extensive review of the chart.    Today's visit:  Patient was seen in his room, while sitting in a chair.  He appears comfortable.  He states that he is getting stronger with therapy.  He had a bowel movement yesterday.  He does endorse slow urine flow.  He complains of left flank/pelvic pain, rating at 7 out of 10.  He denies fever, chills, chest pain, palpitation, dyspnea, nausea, vomiting, or abdominal pain.      CODE STATUS:   CPR/Full code     PAST MEDICAL HISTORY:   Metastatic prostate cancer  Hypertension  RBBB  Dyslipidemia  DM type II  Depression  Anxiety    Past Medical History:   Diagnosis Date     Depressive disorder     anxiety     Hypertension        PAST SURGICAL HISTORY:   Past Surgical History:   Procedure Laterality Date     COLONOSCOPY         FAMILY HISTORY:   Father  of heart attack at age 59.  Mother  of natural causes at old age.  Brother  of dementia.    SOCIAL HISTORY:  Social History     Tobacco Use     Smoking status: Former     Smokeless tobacco: Not on file   Substance Use Topics     Alcohol use: Yes     Comment: 5- weekend       MEDICATIONS:  Current Outpatient Medications   Medication Sig Dispense Refill     acetaminophen (TYLENOL) 325 MG tablet Take 2 tablets (650 mg) by mouth every 6 hours as needed for mild pain or other (and adjunct with moderate or severe pain or per patient request)       amLODIPine (NORVASC) 5 MG tablet Take 1 tablet (5 mg) by mouth daily 30 tablet 0     diphenhydrAMINE-zinc acetate (BENADRYL) 1-0.1 % external cream Apply topically 3 times daily as needed for itching       DULoxetine (CYMBALTA) 20 MG capsule Take 20 mg by mouth 2 times daily       enoxaparin ANTICOAGULANT (LOVENOX) 40 MG/0.4ML syringe Inject 0.4 mLs (40 mg) Subcutaneous every 24 hours       glucose 40 % (400 mg/mL) gel  Take 15-30 g by mouth every 15 minutes as needed for low blood sugar (for blood sugar <60 mg/dl)       insulin aspart (NOVOLOG PEN) 100 UNIT/ML pen Inject 1-7 Units Subcutaneous 3 times daily (before meals) Correction Scale - MEDIUM INSULIN RESISTANCE DOSING     Do Not give Correction Insulin if Pre-Meal BG less than 140.   For Pre-Meal  - 189 give 1 unit.   For Pre-Meal  - 239 give 2 units.   For Pre-Meal  - 289 give 3 units.   For Pre-Meal  - 339 give 4 units.   For Pre-Meal - 399 give 5 units.   For Pre-Meal -449 give 6 units  For Pre-Meal BG greater than or equal to 450 give 7 units.   To be given with prandial insulin, and based on pre-meal blood glucose.    Notify provider if glucose greater than or equal to 350 mg/dL after administration of correction dose.  If given at mealtime, administer within 30 minutes of start of meal. 15 mL      insulin aspart (NOVOLOG PEN) 100 UNIT/ML pen Inject 1-5 Units Subcutaneous At Bedtime MEDIUM INSULIN RESISTANCE DOSING    Do Not give Bedtime Correction Insulin if BG less than  200.   For  - 249 give 1 units.   For  - 299 give 2 units.   For  - 349 give 3 units.   For  -399 give 4 units.   For BG greater than or equal to 400 give 5 units.  Notify provider if glucose greater than or equal to 350 mg/dL after administration of correction dose.  If given at mealtime, administer within 30 minutes of start of meal. 15 mL      insulin  UNIT/ML injection Inject 5 Units Subcutaneous every morning (before breakfast) 15 mL      LORazepam (ATIVAN) 1 MG tablet Take 0.5 tablets (0.5 mg) by mouth every 6 hours as needed for anxiety, nausea or pain 20 tablet 0     magnesium oxide (MAG-OX) 400 MG tablet Take 1 tablet (400 mg) by mouth daily       melatonin 1 MG TABS tablet Take 3 tablets (3 mg) by mouth nightly as needed for sleep       metFORMIN (GLUCOPHAGE XR) 500 MG 24 hr tablet Take 1,000 mg by mouth daily (with dinner)    "    ondansetron (ZOFRAN ODT) 4 MG ODT tab Take 1 tablet (4 mg) by mouth every 6 hours as needed for nausea or vomiting       predniSONE (DELTASONE) 5 MG tablet Take 1 tablet a day for the next 7 days then 1 tablet every other day for 7 days then off.       rosuvastatin (CRESTOR) 20 MG tablet Take 20 mg by mouth daily       senna-docusate (SENOKOT-S/PERICOLACE) 8.6-50 MG tablet Take 1 tablet by mouth 2 times daily as needed for constipation       sertraline (ZOLOFT) 100 MG tablet Take 100 mg by mouth daily         MED REC REQUIRED  Post Medication Reconciliation Status: medication reconcilation previously completed during another office visit      ALLERGIES:  No Known Allergies    ROS:  10 point ROS were negative other than the symptoms noted above in the HPI.    PHYSICAL EXAM:  Vital signs were reviewed in the chart.  Vital Signs: /69   Pulse 91   Temp 98.3  F (36.8  C)   Resp 18   Ht 1.702 m (5' 7\")   Wt 78.8 kg (173 lb 12.8 oz)   SpO2 94%   BMI 27.22 kg/m    General: Comfortable and in no acute distress  HEENT: No conjunctival pallor, no scleral icterus or injection, moist oral mucosa  Cardiovascular: Normal S1, S2, RRR  Respiratory: Lungs clear to auscultation bilaterally  GI: Abdomen soft, non-tender, non-distended, +BS  Extremities: No significant LE edema  Neuro: CX II-XII grossly intact; ROM in all four extremities grossly intact  Psych: Alert and oriented x 2-3; normal affect  Skin: No acute rash    LABORATORY/IMAGING DATA:  All relevant labs and imaging data in [facility] and/or [facility] Everywhere were personally reviewed today.      Most Recent 3 CBC's:  Recent Labs   Lab Test 09/11/23  0747 09/06/23  0609 09/03/23  1712   WBC 5.0 5.7 6.2   HGB 11.6* 12.1* 12.0*   MCV 94 92 92    168 187     Most Recent 3 BMP's:  Recent Labs   Lab Test 09/11/23  0747 09/07/23  0841 09/07/23  0812 09/07/23  0206 09/06/23  1732 09/06/23  1502 09/06/23  0721 09/06/23  0609 09/04/23  1548 09/03/23  1712     -- "   --   --   --   --   --  136  --  140   POTASSIUM 4.5 4.1  --   --   --  4.9  --  3.3*  --  3.5   CHLORIDE 101  --   --   --   --   --   --  102  --  101   CO2 30*  --   --   --   --   --   --  24  --  28   BUN 10.1  --   --   --   --   --   --  9.3  --  11.4   CR 0.64*  --   --   --   --   --   --  0.46*  --  0.65*   ANIONGAP 9  --   --   --   --   --   --  10  --  11   MARKELL 9.7  --   --   --   --   --   --  9.3  --  9.4   *  --  153* 156*   < >  --    < > 151*   < > 260*    < > = values in this interval not displayed.     Most Recent 2 LFT's:Recent Labs   Lab Test 09/06/23  0609 09/03/23  1712   AST 22 19   ALT 22 19   ALKPHOS 102 110   BILITOTAL 0.2 0.4         ASSESSMENT/PLAN:  Generalized muscle weakness,  Physical deconditioning.  Work-up negative for an infectious source or brain metastasis.  Suspected increased weakness due to progression of underlying metastatic prostate cancer with oxycodone contributing to his confusion.  He is getting stronger with therapies.  Plan:  PT/OT evaluation and therapy  Cognitive evaluation per OT for safe discharge planning  Staff to assist with daily care and mobility    Cognitive impairment  SLUMS 9/30 on September 8.  On today's examination, he is oriented x2-3.  Plan:   Standard delirium precautions  Staff to assist with daily care, mobility, medication management, and meals  OT for formal discharge recommendations    Metastatic prostate cancer.  Cancer was first diagnosed in May 2016; treated with radiation and androgen deprivation therapy.  Progression of disease on Zytiga/prednisone.  Patient now has widespread osseous metastases involving calvarium, scapula, ribs, sternum, spine, and pelvis.  Plan:  Continue pain management with acetaminophen and oxycodone as ordered  Taper and discontinue prednisone as ordered  (prednisone 5 mg daily for 7 days, then 5 mg every other day for 7 days, then stop; last dose on September 24)  Follow-up with oncology as directed with  plan for initiating chemotherapy with Taxotere    Hypertension.  BTA amlodipine was discontinued on September 8 due to hypotension.  Blood pressure is now fairly controlled.  Plan:  Monitor blood pressure    Dyslipidemia.    Plan:  Continue rosuvastatin 20 mg daily    DM type II.  Last hemoglobin A1c 8.4% on September 3, 2023.  Started on insulin NPH and sliding scale in the hospital for poorly controlled diabetes.  Blood glucose levels are in the range of 154-277.  Plan:  Continue metformin 1000 mg daily  Increase insulin NPH from 5 units to 6 units subcu every morning  Continue sinus NovoLog  Monitor blood glucose    Anemia.  Mild.  Today's CBC with hemoglobin 11.6 and MCV 94.  Plan:  Recheck CBC on September 18  Monitor for signs or symptoms of blood loss    Depression,  Anxiety,  Insomnia.  Plan:  Continue sertraline 100 daily  Continue duloxetine 20 mg twice daily  Continue lorazepam 0.5 mg every PRN for anxiety  Continue melatonin 3 mg at bedtime as needed  Monitor symptoms  Refer to ACP PRN     DVT prophylaxis.  Plan  Continue enoxaparin 40 mg subcu daily         Orders written by provider at facility:  CBC on September 18, DX: Anemia  Increase insulin NPH to 6 units subcu every morning; hold for blood glucose less than 100, DX: DM type II    Recommendation by provider at facility:  Monitor CBC intermittently while patient stays on enoxaparin        Disclaimer: This note may contain text created using speech-recognition software and may contain unintended word substitutions.      Electronically signed by:  Laz Lewis MD

## 2023-09-09 ENCOUNTER — LAB REQUISITION (OUTPATIENT)
Dept: LAB | Facility: CLINIC | Age: 78
End: 2023-09-09
Payer: COMMERCIAL

## 2023-09-09 DIAGNOSIS — U07.1 COVID-19: ICD-10-CM

## 2023-09-09 PROCEDURE — 87635 SARS-COV-2 COVID-19 AMP PRB: CPT | Performed by: NURSE PRACTITIONER

## 2023-09-10 ENCOUNTER — LAB REQUISITION (OUTPATIENT)
Dept: LAB | Facility: CLINIC | Age: 78
End: 2023-09-10
Payer: COMMERCIAL

## 2023-09-10 DIAGNOSIS — E78.5 HYPERLIPIDEMIA, UNSPECIFIED: ICD-10-CM

## 2023-09-10 DIAGNOSIS — D64.9 ANEMIA, UNSPECIFIED: ICD-10-CM

## 2023-09-10 LAB
GAMMA INTERFERON BACKGROUND BLD IA-ACNC: 0.02 IU/ML
M TB IFN-G BLD-IMP: NEGATIVE
M TB IFN-G CD4+ BCKGRND COR BLD-ACNC: 7.14 IU/ML
MITOGEN IGNF BCKGRD COR BLD-ACNC: 0 IU/ML
MITOGEN IGNF BCKGRD COR BLD-ACNC: 0.01 IU/ML
QUANTIFERON MITOGEN: 7.16 IU/ML
QUANTIFERON NIL TUBE: 0.02 IU/ML
QUANTIFERON TB1 TUBE: 0.03 IU/ML
QUANTIFERON TB2 TUBE: 0.02
SARS-COV-2 RNA RESP QL NAA+PROBE: NEGATIVE

## 2023-09-11 ENCOUNTER — LAB REQUISITION (OUTPATIENT)
Dept: LAB | Facility: CLINIC | Age: 78
End: 2023-09-11
Payer: COMMERCIAL

## 2023-09-11 ENCOUNTER — TRANSITIONAL CARE UNIT VISIT (OUTPATIENT)
Dept: GERIATRICS | Facility: CLINIC | Age: 78
End: 2023-09-11
Payer: COMMERCIAL

## 2023-09-11 VITALS
BODY MASS INDEX: 27.28 KG/M2 | RESPIRATION RATE: 18 BRPM | OXYGEN SATURATION: 94 % | TEMPERATURE: 98.3 F | DIASTOLIC BLOOD PRESSURE: 69 MMHG | WEIGHT: 173.8 LBS | HEIGHT: 67 IN | HEART RATE: 91 BPM | SYSTOLIC BLOOD PRESSURE: 122 MMHG

## 2023-09-11 DIAGNOSIS — F41.9 ANXIETY: ICD-10-CM

## 2023-09-11 DIAGNOSIS — M62.81 MUSCLE WEAKNESS (GENERALIZED): Primary | ICD-10-CM

## 2023-09-11 DIAGNOSIS — E78.5 DYSLIPIDEMIA: ICD-10-CM

## 2023-09-11 DIAGNOSIS — C61 PROSTATE CANCER METASTATIC TO BONE (H): ICD-10-CM

## 2023-09-11 DIAGNOSIS — I10 ESSENTIAL HYPERTENSION: ICD-10-CM

## 2023-09-11 DIAGNOSIS — E11.65 POORLY CONTROLLED DIABETES MELLITUS (H): ICD-10-CM

## 2023-09-11 DIAGNOSIS — U07.1 COVID-19: ICD-10-CM

## 2023-09-11 DIAGNOSIS — C79.51 PROSTATE CANCER METASTATIC TO BONE (H): ICD-10-CM

## 2023-09-11 DIAGNOSIS — R41.89 COGNITIVE IMPAIRMENT: ICD-10-CM

## 2023-09-11 DIAGNOSIS — R53.81 PHYSICAL DECONDITIONING: ICD-10-CM

## 2023-09-11 DIAGNOSIS — F32.A DEPRESSION, UNSPECIFIED DEPRESSION TYPE: ICD-10-CM

## 2023-09-11 DIAGNOSIS — D64.9 ANEMIA, UNSPECIFIED TYPE: ICD-10-CM

## 2023-09-11 LAB
ANION GAP SERPL CALCULATED.3IONS-SCNC: 9 MMOL/L (ref 7–15)
BUN SERPL-MCNC: 10.1 MG/DL (ref 8–23)
CALCIUM SERPL-MCNC: 9.7 MG/DL (ref 8.8–10.2)
CHLORIDE SERPL-SCNC: 101 MMOL/L (ref 98–107)
CREAT SERPL-MCNC: 0.64 MG/DL (ref 0.67–1.17)
DEPRECATED HCO3 PLAS-SCNC: 30 MMOL/L (ref 22–29)
EGFRCR SERPLBLD CKD-EPI 2021: >90 ML/MIN/1.73M2
ERYTHROCYTE [DISTWIDTH] IN BLOOD BY AUTOMATED COUNT: 13.1 % (ref 10–15)
GLUCOSE SERPL-MCNC: 135 MG/DL (ref 70–99)
HCT VFR BLD AUTO: 35.5 % (ref 40–53)
HGB BLD-MCNC: 11.6 G/DL (ref 13.3–17.7)
MCH RBC QN AUTO: 30.8 PG (ref 26.5–33)
MCHC RBC AUTO-ENTMCNC: 32.7 G/DL (ref 31.5–36.5)
MCV RBC AUTO: 94 FL (ref 78–100)
PLATELET # BLD AUTO: 165 10E3/UL (ref 150–450)
POTASSIUM SERPL-SCNC: 4.5 MMOL/L (ref 3.4–5.3)
RBC # BLD AUTO: 3.77 10E6/UL (ref 4.4–5.9)
SODIUM SERPL-SCNC: 140 MMOL/L (ref 136–145)
WBC # BLD AUTO: 5 10E3/UL (ref 4–11)

## 2023-09-11 PROCEDURE — 99306 1ST NF CARE HIGH MDM 50: CPT | Performed by: INTERNAL MEDICINE

## 2023-09-11 PROCEDURE — 85027 COMPLETE CBC AUTOMATED: CPT

## 2023-09-11 PROCEDURE — P9604 ONE-WAY ALLOW PRORATED TRIP: HCPCS

## 2023-09-11 PROCEDURE — 36415 COLL VENOUS BLD VENIPUNCTURE: CPT

## 2023-09-11 PROCEDURE — 82310 ASSAY OF CALCIUM: CPT

## 2023-09-11 PROCEDURE — 87635 SARS-COV-2 COVID-19 AMP PRB: CPT

## 2023-09-11 NOTE — LETTER
9/11/2023        RE: Migue Beach  [address]  [place] [address]        [facility]  INITIAL VISIT NOTE  September 11, 2023    PRIMARY CARE PROVIDER AND CLINIC:  Kalpesh Dey [address] / [place] [id number]    CHIEF COMPLAINT:  Hospital follow-up/Initial visit    HPI:    Migue Beach is a 77 year old  (1945) male who was seen at [facility] on September 11, 2023 for an initial visit.     Medical history is notable for metastatic prostate cancer, hypertension, dyslipidemia, DM type II, depression, and anxiety.    Summary of hospital course:  Patient was hospitalized at [facility] from September 3 through September 7, 2023 for generalized weakness and confusion.  EKG showed normal sinus rhythm and RBBB.  Initial work-up was significant for normal electrolytes, creatinine 0.65, lactic acid 1.2, TSH 1.16, B12 427, hemoglobin 12, normal AST/ALT/alkaline phosphatase, white count 6.2, and unremarkable UA.  Screening for COVID-19 was negative.  CT head showed no acute intracranial pathology.  Brain MRI was negative for acute intracranial abnormality or parenchymal metastasis.  There were numerous osseous metastases with pachymeningeal enhancement underlying the left temporal calvarial lesion.  CT chest with contrast showed a few small pulmonary nodules, unchanged, diffuse skeletal metastasis, and numerous enlarged retroperitoneal lymph nodes.  MRI thoracolumbar spine demonstrated widespread osseous metastasis without canal compromise or pathologic fracture as well as 5 mm enhancing intradural extramedullary lesion at T9-T10, with a differential including peripheral nerve sheath tumor such as schwannoma versus meningioma.  It was felt that his symptoms were related to progression of metastatic prostate cancer and possibility of oxycodone contributing to his confusion and weakness.  Insulin was started for poorly controlled diabetes and  he was started on enoxaparin for DVT prophylaxis.  TCU was recommended per therapies.    Patient is admitted to this facility for medical management, nursing care, and rehab.     Of note, history was obtained from patient, facility RN, and extensive review of the chart.    Today's visit:  Patient was seen in his room, while sitting in a chair.  He appears comfortable.  He states that he is getting stronger with therapy.  He had a bowel movement yesterday.  He does endorse slow urine flow.  He complains of left flank/pelvic pain, rating at 7 out of 10.  He denies fever, chills, chest pain, palpitation, dyspnea, nausea, vomiting, or abdominal pain.      CODE STATUS:   CPR/Full code     PAST MEDICAL HISTORY:   Metastatic prostate cancer  Hypertension  RBBB  Dyslipidemia  DM type II  Depression  Anxiety    Past Medical History:   Diagnosis Date     Depressive disorder     anxiety     Hypertension        PAST SURGICAL HISTORY:   Past Surgical History:   Procedure Laterality Date     COLONOSCOPY         FAMILY HISTORY:   Father  of heart attack at age 59.  Mother  of natural causes at old age.  Brother  of dementia.    SOCIAL HISTORY:  Social History     Tobacco Use     Smoking status: Former     Smokeless tobacco: Not on file   Substance Use Topics     Alcohol use: Yes     Comment: 5- weekend       MEDICATIONS:  Current Outpatient Medications   Medication Sig Dispense Refill     acetaminophen (TYLENOL) 325 MG tablet Take 2 tablets (650 mg) by mouth every 6 hours as needed for mild pain or other (and adjunct with moderate or severe pain or per patient request)       amLODIPine (NORVASC) 5 MG tablet Take 1 tablet (5 mg) by mouth daily 30 tablet 0     diphenhydrAMINE-zinc acetate (BENADRYL) 1-0.1 % external cream Apply topically 3 times daily as needed for itching       DULoxetine (CYMBALTA) 20 MG capsule Take 20 mg by mouth 2 times daily       enoxaparin ANTICOAGULANT (LOVENOX) 40 MG/0.4ML syringe Inject 0.4  mLs (40 mg) Subcutaneous every 24 hours       glucose 40 % (400 mg/mL) gel Take 15-30 g by mouth every 15 minutes as needed for low blood sugar (for blood sugar <60 mg/dl)       insulin aspart (NOVOLOG PEN) 100 UNIT/ML pen Inject 1-7 Units Subcutaneous 3 times daily (before meals) Correction Scale - MEDIUM INSULIN RESISTANCE DOSING     Do Not give Correction Insulin if Pre-Meal BG less than 140.   For Pre-Meal  - 189 give 1 unit.   For Pre-Meal  - 239 give 2 units.   For Pre-Meal  - 289 give 3 units.   For Pre-Meal  - 339 give 4 units.   For Pre-Meal - 399 give 5 units.   For Pre-Meal -449 give 6 units  For Pre-Meal BG greater than or equal to 450 give 7 units.   To be given with prandial insulin, and based on pre-meal blood glucose.    Notify provider if glucose greater than or equal to 350 mg/dL after administration of correction dose.  If given at mealtime, administer within 30 minutes of start of meal. 15 mL      insulin aspart (NOVOLOG PEN) 100 UNIT/ML pen Inject 1-5 Units Subcutaneous At Bedtime MEDIUM INSULIN RESISTANCE DOSING    Do Not give Bedtime Correction Insulin if BG less than  200.   For  - 249 give 1 units.   For  - 299 give 2 units.   For  - 349 give 3 units.   For  -399 give 4 units.   For BG greater than or equal to 400 give 5 units.  Notify provider if glucose greater than or equal to 350 mg/dL after administration of correction dose.  If given at mealtime, administer within 30 minutes of start of meal. 15 mL      insulin  UNIT/ML injection Inject 5 Units Subcutaneous every morning (before breakfast) 15 mL      LORazepam (ATIVAN) 1 MG tablet Take 0.5 tablets (0.5 mg) by mouth every 6 hours as needed for anxiety, nausea or pain 20 tablet 0     magnesium oxide (MAG-OX) 400 MG tablet Take 1 tablet (400 mg) by mouth daily       melatonin 1 MG TABS tablet Take 3 tablets (3 mg) by mouth nightly as needed for sleep       metFORMIN  "(GLUCOPHAGE XR) 500 MG 24 hr tablet Take 1,000 mg by mouth daily (with dinner)       ondansetron (ZOFRAN ODT) 4 MG ODT tab Take 1 tablet (4 mg) by mouth every 6 hours as needed for nausea or vomiting       predniSONE (DELTASONE) 5 MG tablet Take 1 tablet a day for the next 7 days then 1 tablet every other day for 7 days then off.       rosuvastatin (CRESTOR) 20 MG tablet Take 20 mg by mouth daily       senna-docusate (SENOKOT-S/PERICOLACE) 8.6-50 MG tablet Take 1 tablet by mouth 2 times daily as needed for constipation       sertraline (ZOLOFT) 100 MG tablet Take 100 mg by mouth daily         MED REC REQUIRED  Post Medication Reconciliation Status: medication reconcilation previously completed during another office visit      ALLERGIES:  No Known Allergies    ROS:  10 point ROS were negative other than the symptoms noted above in the HPI.    PHYSICAL EXAM:  Vital signs were reviewed in the chart.  Vital Signs: /69   Pulse 91   Temp 98.3  F (36.8  C)   Resp 18   Ht 1.702 m (5' 7\")   Wt 78.8 kg (173 lb 12.8 oz)   SpO2 94%   BMI 27.22 kg/m    General: Comfortable and in no acute distress  HEENT: No conjunctival pallor, no scleral icterus or injection, moist oral mucosa  Cardiovascular: Normal S1, S2, RRR  Respiratory: Lungs clear to auscultation bilaterally  GI: Abdomen soft, non-tender, non-distended, +BS  Extremities: No significant LE edema  Neuro: CX II-XII grossly intact; ROM in all four extremities grossly intact  Psych: Alert and oriented x 2-3; normal affect  Skin: No acute rash    LABORATORY/IMAGING DATA:  All relevant labs and imaging data in [facility] and/or [facility] Everywhere were personally reviewed today.      Most Recent 3 CBC's:  Recent Labs   Lab Test 09/11/23  0747 09/06/23  0609 09/03/23  1712   WBC 5.0 5.7 6.2   HGB 11.6* 12.1* 12.0*   MCV 94 92 92    168 187     Most Recent 3 BMP's:  Recent Labs   Lab Test 09/11/23  0747 09/07/23  0841 09/07/23  0812 09/07/23  0206 09/06/23  1732 " 09/06/23  1502 09/06/23  0721 09/06/23  0609 09/04/23  1548 09/03/23  1712     --   --   --   --   --   --  136  --  140   POTASSIUM 4.5 4.1  --   --   --  4.9  --  3.3*  --  3.5   CHLORIDE 101  --   --   --   --   --   --  102  --  101   CO2 30*  --   --   --   --   --   --  24  --  28   BUN 10.1  --   --   --   --   --   --  9.3  --  11.4   CR 0.64*  --   --   --   --   --   --  0.46*  --  0.65*   ANIONGAP 9  --   --   --   --   --   --  10  --  11   MARKELL 9.7  --   --   --   --   --   --  9.3  --  9.4   *  --  153* 156*   < >  --    < > 151*   < > 260*    < > = values in this interval not displayed.     Most Recent 2 LFT's:Recent Labs   Lab Test 09/06/23  0609 09/03/23  1712   AST 22 19   ALT 22 19   ALKPHOS 102 110   BILITOTAL 0.2 0.4         ASSESSMENT/PLAN:  Generalized muscle weakness,  Physical deconditioning.  Work-up negative for an infectious source or brain metastasis.  Suspected increased weakness due to progression of underlying metastatic prostate cancer with oxycodone contributing to his confusion.  He is getting stronger with therapies.  Plan:  PT/OT evaluation and therapy  Cognitive evaluation per OT for safe discharge planning  Staff to assist with daily care and mobility    Cognitive impairment  SLUMS 9/30 on September 8.  On today's examination, he is oriented x2-3.  Plan:   Standard delirium precautions  Staff to assist with daily care, mobility, medication management, and meals  OT for formal discharge recommendations    Metastatic prostate cancer.  Cancer was first diagnosed in May 2016; treated with radiation and androgen deprivation therapy.  Progression of disease on Zytiga/prednisone.  Patient now has widespread osseous metastases involving calvarium, scapula, ribs, sternum, spine, and pelvis.  Plan:  Continue pain management with acetaminophen and oxycodone as ordered  Taper and discontinue prednisone as ordered  (prednisone 5 mg daily for 7 days, then 5 mg every other day for  7 days, then stop; last dose on September 24)  Follow-up with oncology as directed with plan for initiating chemotherapy with Taxotere    Hypertension.  BTA amlodipine was discontinued on September 8 due to hypotension.  Blood pressure is now fairly controlled.  Plan:  Monitor blood pressure    Dyslipidemia.    Plan:  Continue rosuvastatin 20 mg daily    DM type II.  Last hemoglobin A1c 8.4% on September 3, 2023.  Started on insulin NPH and sliding scale in the hospital for poorly controlled diabetes.  Blood glucose levels are in the range of 154-277.  Plan:  Continue metformin 1000 mg daily  Increase insulin NPH from 5 units to 6 units subcu every morning  Continue sinus NovoLog  Monitor blood glucose    Anemia.  Mild.  Today's CBC with hemoglobin 11.6 and MCV 94.  Plan:  Recheck CBC on September 18  Monitor for signs or symptoms of blood loss    Depression,  Anxiety,  Insomnia.  Plan:  Continue sertraline 100 daily  Continue duloxetine 20 mg twice daily  Continue lorazepam 0.5 mg every PRN for anxiety  Continue melatonin 3 mg at bedtime as needed  Monitor symptoms  Refer to ACP PRN     DVT prophylaxis.  Plan  Continue enoxaparin 40 mg subcu daily         Orders written by provider at facility:  CBC on September 18, DX: Anemia  Increase insulin NPH to 6 units subcu every morning; hold for blood glucose less than 100, DX: DM type II    Recommendation by provider at facility:  Monitor CBC intermittently while patient stays on enoxaparin        Disclaimer: This note may contain text created using speech-recognition software and may contain unintended word substitutions.      Electronically signed by:  Laz Lewis MD                            Sincerely,        Laz Lewis MD

## 2023-09-11 NOTE — PATIENT INSTRUCTIONS
Orders for Migue Beach (1945), MR# [id number]:    1) CBC on September 18, DX: Anemia  2) Increase insulin NPH to 6 units subcu every morning; hold for blood glucose less than 100, DX: DM type II      Laz Lewis MD  [facility]

## 2023-09-12 LAB — SARS-COV-2 RNA RESP QL NAA+PROBE: NEGATIVE

## 2023-09-17 ENCOUNTER — LAB REQUISITION (OUTPATIENT)
Dept: LAB | Facility: CLINIC | Age: 78
End: 2023-09-17
Payer: COMMERCIAL

## 2023-09-17 DIAGNOSIS — D64.9 ANEMIA, UNSPECIFIED: ICD-10-CM

## 2023-09-18 ENCOUNTER — TRANSITIONAL CARE UNIT VISIT (OUTPATIENT)
Dept: GERIATRICS | Facility: CLINIC | Age: 78
End: 2023-09-18
Payer: COMMERCIAL

## 2023-09-18 VITALS
DIASTOLIC BLOOD PRESSURE: 65 MMHG | HEIGHT: 67 IN | WEIGHT: 173 LBS | HEART RATE: 96 BPM | OXYGEN SATURATION: 97 % | BODY MASS INDEX: 27.15 KG/M2 | SYSTOLIC BLOOD PRESSURE: 107 MMHG | TEMPERATURE: 97.6 F | RESPIRATION RATE: 16 BRPM

## 2023-09-18 DIAGNOSIS — R41.89 COGNITIVE IMPAIRMENT: ICD-10-CM

## 2023-09-18 DIAGNOSIS — C79.51 PROSTATE CANCER METASTATIC TO BONE (H): Primary | ICD-10-CM

## 2023-09-18 DIAGNOSIS — I10 ESSENTIAL HYPERTENSION: ICD-10-CM
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
## 2023-09-18 DIAGNOSIS — C61 PROSTATE CANCER METASTATIC TO BONE (H): Primary | ICD-10-CM

## 2023-09-18 DIAGNOSIS — F41.9 ANXIETY: ICD-10-CM

## 2023-09-18 DIAGNOSIS — K59.01 SLOW TRANSIT CONSTIPATION: ICD-10-CM

## 2023-09-18 DIAGNOSIS — E78.5 DYSLIPIDEMIA: ICD-10-CM

## 2023-09-18 DIAGNOSIS — R52 PAIN: Primary | ICD-10-CM

## 2023-09-18 DIAGNOSIS — E11.69 TYPE 2 DIABETES MELLITUS WITH OTHER SPECIFIED COMPLICATION, WITHOUT LONG-TERM CURRENT USE OF INSULIN (H): ICD-10-CM

## 2023-09-18 DIAGNOSIS — D64.9 ANEMIA, UNSPECIFIED TYPE: ICD-10-CM

## 2023-09-18 DIAGNOSIS — R53.81 PHYSICAL DECONDITIONING: ICD-10-CM

## 2023-09-18 DIAGNOSIS — F32.A DEPRESSION, UNSPECIFIED DEPRESSION TYPE: ICD-10-CM

## 2023-09-18 LAB
ERYTHROCYTE [DISTWIDTH] IN BLOOD BY AUTOMATED COUNT: 13 % (ref 10–15)
HCT VFR BLD AUTO: 34.1 % (ref 40–53)
HGB BLD-MCNC: 11.3 G/DL (ref 13.3–17.7)
MCH RBC QN AUTO: 30.6 PG (ref 26.5–33)
MCHC RBC AUTO-ENTMCNC: 33.1 G/DL (ref 31.5–36.5)
MCV RBC AUTO: 92 FL (ref 78–100)
PLATELET # BLD AUTO: 196 10E3/UL (ref 150–450)
RBC # BLD AUTO: 3.69 10E6/UL (ref 4.4–5.9)
WBC # BLD AUTO: 7.6 10E3/UL (ref 4–11)

## 2023-09-18 PROCEDURE — 85027 COMPLETE CBC AUTOMATED: CPT

## 2023-09-18 PROCEDURE — 99309 SBSQ NF CARE MODERATE MDM 30: CPT

## 2023-09-18 RX ORDER — OXYCODONE HYDROCHLORIDE 5 MG/1
2.5-5 TABLET ORAL EVERY 4 HOURS PRN
COMMUNITY
End: 2023-09-18

## 2023-09-18 RX ORDER — OXYCODONE HYDROCHLORIDE 5 MG/1
2.5-5 TABLET ORAL EVERY 4 HOURS PRN
Qty: 12 TABLET | Refills: 0 | Status: SHIPPED | OUTPATIENT
Start: 2023-09-18 | End: 2023-01-01

## 2023-09-18 NOTE — PROGRESS NOTES
"[facility] GERIATRICS    Chief Complaint   Patient presents with    RECHECK     HPI:  Migue Beach is a 77 year old  (1945), who is being seen today for an episodic care visit at: [facility]  ([facility]) [[id number]].     By chart review, patient presented to the ED with confusion.  Family reported increased fatigue and weakness over the last month culminating in an several falls 8/30.  He has a history of prostate cancer with metastasis on opiates for chronic back pain.  In ED, work-up remarkable for glucose 216, creatinine 0.65, white 0.2, hemoglobin 12.  UA was unremarkable.  CT demonstrated presumed chronic microvascular changes, no acute intracranial process.  MRI of the brain with numerous osseous metastasis and patchy meningeal enhancement underlying the left parietal calvarial lesion, reactive versus meningeal spread of disease.  Chest x-ray without acute cardiopulmonary findings.  Thoracic and lumbar MRI demonstrated widespread osseous metastasis, 5 mm intradural extramedullary lesion at T9-10 and left supraclavicular and retroperitoneal metastasis.  He was seen by oncology and therapy team, recommended TCU to improve strength before starting an alternative regimen.  Prednisone was decreased to 5 mg daily.  A1c was elevated at 8.4 and he was started on NPH. He was admitted to the above facility for ongoing rehab, medical management.      Today's concern is: Seen today for routine follow-up in TCU resting abed. He reports ongoing chronic back pain and a mild headache this morning and is taking a rest. He has a \"decent\" appetite. He denies dysuria, notes some urinary urgency. He reports a bowel movement yesterday. He is denying CP, SOB, changes in b/b habits, fever/chills. Nursing report patient is more mobile, up to bathroom frequently with urinary urgency/frequency.     Allergies, and PMH/PSH reviewed in EPIC today.  REVIEW OF SYSTEMS:  Limited secondary to cognitive impairment but today " "pt reports the above    Objective:   /65   Pulse 96   Temp 97.6  F (36.4  C)   Resp 16   Ht 1.702 m (5' 7\")   Wt 78.5 kg (173 lb)   SpO2 97%   BMI 27.10 kg/m    GENERAL APPEARANCE:  Alert, in no distress  RESP:  respiratory effort and palpation of chest normal, lungs clear to auscultation , no respiratory distress  CV:  Palpation and auscultation of heart done , regular rate and rhythm, no murmur, rub, or gallop, peripheral edema 1+ in ble  ABDOMEN:  normal bowel sounds, soft, nontender, no hepatosplenomegaly or other masses  M/S:   Gait and station abnormal transfers with assist  SKIN:  Inspection of skin and subcutaneous tissue baseline, Palpation of skin and subcutaneous tissue baseline  NEURO:   Cranial nerves 2-12 are normal tested and grossly at patient's baseline, robins freely, follows simple commands  PSYCH:  insight and judgement impaired, memory impaired , affect and mood normal    Labs done in SNF are in [facility]. Please refer to them using [employer]/Care Everywhere. and Recent labs in EPIC reviewed by me today.     Assessment/Plan:  (C61,  C79.51) Prostate cancer metastatic to bone (H)  (primary encounter diagnosis)  (R53.81) Physical deconditioning  (W19.XXXD) Fall, subsequent encounter  Comment: Chronic prostate cancer with widespread metastasis to the spine and skull, specifically left temporal bone.  Recent progression while on Zytiga/prednisone.  Progressive deconditioning and physical decline resulting in several falls outpatient, CT of the head without acute findings.  In TCU for rehab with plan to start Taxotere in approximately 2 weeks  -urinary frequency/urgency at baseline per wife Blanca today  Plan: Continue prednisone 5 mg every other day until 9/18, then discontinue. PT/OT, follow-up with oncology per recommendations.  As needed Tylenol and oxycodone for pain.     (E11.69) Type 2 diabetes mellitus with other specified complication, without long-term current use of insulin " (H)  Comment: Chronic, A1c 8.4.  Started on NPH with PTA metformin.  There was consideration of SGLT2 or GLP-1 but given pending chemotherapy opted for insulin which can be more easily held in the setting of poor oral intakes.  Prednisone decreased 10>5 mg daily>now 5 mg every other day.  -recent -240s  Plan: Monitor blood glucose 4 times daily, metformin 1000 mg daily, NPH 5 units daily with aspart sliding scale.      (I10) Essential hypertension  Comment: Chronic, mildly hypertensive on hospital admission, readings in TCU normal low.  Amlodipine discontinued.   BP Readings from Last 3 Encounters:   09/18/23 107/65   09/11/23 122/69   09/08/23 92/59   Plan: Discontinue amlodipine, monitor BP off of antihypertensives with adjustments as needed.       (E78.5) Dyslipidemia  Comment: Chronic  Plan: Continue rosuvastatin     (F41.9) Anxiety  (F32.A) Depression, unspecified depression type  Comment: Chronic, mood okay  Plan: Continue sertraline 100 mg daily, Cymbalta 20 mg twice daily, melatonin for sleep, ativan PRN for anxiety, monitor mood, behaviors, sleep habits     (R41.89) Cognitive impairment  Comment: Slums 9/30 on 9/8. Wife Blanca acts as an independent historian today via telephone.  Plan: SNF for assist with ADLs, medication management, meals, activities.  OT for formal discharge recommendations     (D64.9) Anemia, unspecified type  Comment: Chronic, mild  Plan: CBC 9/11, monitor hemoglobin periodically     (K59.01) Slow transit constipation  Comment: Chronic, opiate use and immobility contributing  Plan: Start senna S1 tab daily, continue twice daily as needed     DVT prophylaxis: Discharged on lovenox. No hx of PE/DVT. Does have cancer which increases risk but verified with wife today no history of the same. He is ambulatory now, at risk for falls, will discontinue, can follow-up with oncology. Management discussed with nursing, MD, and wife Blanca today.        Orders:  Discontinue  lovenox    Electronically signed by: SANDRA Harry CNP

## 2023-09-18 NOTE — LETTER
"    9/18/2023        RE: Migue Beach  [address]  [facility] [address]        [facility] GERIATRICS    Chief Complaint   Patient presents with     RECHECK     HPI:  Migue Beach is a 77 year old  (1945), who is being seen today for an episodic care visit at: [facility]  ([facility]) [[id number]].     By chart review, patient presented to the ED with confusion.  Family reported increased fatigue and weakness over the last month culminating in an several falls 8/30.  He has a history of prostate cancer with metastasis on opiates for chronic back pain.  In ED, work-up remarkable for glucose 216, creatinine 0.65, white 0.2, hemoglobin 12.  UA was unremarkable.  CT demonstrated presumed chronic microvascular changes, no acute intracranial process.  MRI of the brain with numerous osseous metastasis and patchy meningeal enhancement underlying the left parietal calvarial lesion, reactive versus meningeal spread of disease.  Chest x-ray without acute cardiopulmonary findings.  Thoracic and lumbar MRI demonstrated widespread osseous metastasis, 5 mm intradural extramedullary lesion at T9-10 and left supraclavicular and retroperitoneal metastasis.  He was seen by oncology and therapy team, recommended TCU to improve strength before starting an alternative regimen.  Prednisone was decreased to 5 mg daily.  A1c was elevated at 8.4 and he was started on NPH. He was admitted to the above facility for ongoing rehab, medical management.      Today's concern is: Seen today for routine follow-up in TCU resting abed. He reports ongoing chronic back pain and a mild headache this morning and is taking a rest. He has a \"decent\" appetite. He denies dysuria, notes some urinary urgency. He reports a bowel movement yesterday. He is denying CP, SOB, changes in b/b habits, fever/chills. Nursing report patient is more mobile, up to bathroom frequently with urinary urgency/frequency.     Allergies, and PMH/PSH " "reviewed in EPIC today.  REVIEW OF SYSTEMS:  Limited secondary to cognitive impairment but today pt reports the above    Objective:   /65   Pulse 96   Temp 97.6  F (36.4  C)   Resp 16   Ht 1.702 m (5' 7\")   Wt 78.5 kg (173 lb)   SpO2 97%   BMI 27.10 kg/m    GENERAL APPEARANCE:  Alert, in no distress  RESP:  respiratory effort and palpation of chest normal, lungs clear to auscultation , no respiratory distress  CV:  Palpation and auscultation of heart done , regular rate and rhythm, no murmur, rub, or gallop, peripheral edema 1+ in ble  ABDOMEN:  normal bowel sounds, soft, nontender, no hepatosplenomegaly or other masses  M/S:   Gait and station abnormal transfers with assist  SKIN:  Inspection of skin and subcutaneous tissue baseline, Palpation of skin and subcutaneous tissue baseline  NEURO:   Cranial nerves 2-12 are normal tested and grossly at patient's baseline, robins freely, follows simple commands  PSYCH:  insight and judgement impaired, memory impaired , affect and mood normal    Labs done in SNF are in [facility]. Please refer to them using [employer]/Care Everywhere. and Recent labs in [facility] reviewed by me today.     Assessment/Plan:  (C61,  C79.51) Prostate cancer metastatic to bone (H)  (primary encounter diagnosis)  (R53.81) Physical deconditioning  (W19.XXXD) Fall, subsequent encounter  Comment: Chronic prostate cancer with widespread metastasis to the spine and skull, specifically left temporal bone.  Recent progression while on Zytiga/prednisone.  Progressive deconditioning and physical decline resulting in several falls outpatient, CT of the head without acute findings.  In TCU for rehab with plan to start Taxotere in approximately 2 weeks  -urinary frequency/urgency at baseline per wife Blanca today  Plan: Continue prednisone 5 mg every other day until 9/18, then discontinue. PT/OT, follow-up with oncology per recommendations.  As needed Tylenol and oxycodone for pain.     (E11.69) Type 2 " diabetes mellitus with other specified complication, without long-term current use of insulin (H)  Comment: Chronic, A1c 8.4.  Started on NPH with PTA metformin.  There was consideration of SGLT2 or GLP-1 but given pending chemotherapy opted for insulin which can be more easily held in the setting of poor oral intakes.  Prednisone decreased 10>5 mg daily>now 5 mg every other day.  -recent -240s  Plan: Monitor blood glucose 4 times daily, metformin 1000 mg daily, NPH 5 units daily with aspart sliding scale.      (I10) Essential hypertension  Comment: Chronic, mildly hypertensive on hospital admission, readings in TCU normal low.  Amlodipine discontinued.   BP Readings from Last 3 Encounters:   09/18/23 107/65   09/11/23 122/69   09/08/23 92/59   Plan: Discontinue amlodipine, monitor BP off of antihypertensives with adjustments as needed.       (E78.5) Dyslipidemia  Comment: Chronic  Plan: Continue rosuvastatin     (F41.9) Anxiety  (F32.A) Depression, unspecified depression type  Comment: Chronic, mood okay  Plan: Continue sertraline 100 mg daily, Cymbalta 20 mg twice daily, melatonin for sleep, ativan PRN for anxiety, monitor mood, behaviors, sleep habits     (R41.89) Cognitive impairment  Comment: Slums 9/30 on 9/8. Wife Blanca acts as an independent historian today via telephone.  Plan: SNF for assist with ADLs, medication management, meals, activities.  OT for formal discharge recommendations     (D64.9) Anemia, unspecified type  Comment: Chronic, mild  Plan: CBC 9/11, monitor hemoglobin periodically     (K59.01) Slow transit constipation  Comment: Chronic, opiate use and immobility contributing  Plan: Start senna S1 tab daily, continue twice daily as needed     DVT prophylaxis: Discharged on lovenox. No hx of PE/DVT. Does have cancer which increases risk but verified with wife today no history of the same. He is ambulatory now, at risk for falls, will discontinue, can follow-up with oncology. Management  discussed with nursing, MD, and wife Blanca today.        Orders:  Discontinue lovenox    Electronically signed by: SANDRA Harry CNP           Sincerely,        SANDRA Harry CNP

## 2023-09-22 PROBLEM — E11.9 DIABETES MELLITUS, TYPE 2 (H): Status: ACTIVE | Noted: 2023-01-01

## 2023-09-22 NOTE — PROGRESS NOTES
[facility] GERIATRICS    Chief Complaint   Patient presents with    RECHECK     HPI:  Migue Beach is a 77 year old  (1945), who is being seen today for an episodic care visit at: [facility]  () [[id number]].     By chart review, patient presented to the ED with confusion.  Family reported increased fatigue and weakness over the last month culminating in an several falls 8/30.  He has a history of prostate cancer with metastasis on opiates for chronic back pain.  In ED, work-up remarkable for glucose 216, creatinine 0.65, white 0.2, hemoglobin 12.  UA was unremarkable.  CT demonstrated presumed chronic microvascular changes, no acute intracranial process.  MRI of the brain with numerous osseous metastasis and patchy meningeal enhancement underlying the left parietal calvarial lesion, reactive versus meningeal spread of disease.  Chest x-ray without acute cardiopulmonary findings.  Thoracic and lumbar MRI demonstrated widespread osseous metastasis, 5 mm intradural extramedullary lesion at T9-10 and left supraclavicular and retroperitoneal metastasis.  He was seen by oncology and therapy team, recommended TCU to improve strength before starting an alternative regimen.  Prednisone was decreased to 5 mg daily.  A1c was elevated at 8.4 and he was started on NPH. He was admitted to the above facility for ongoing rehab, medical management.      Today's concern is: Seen today at nursing/patient request for left rib pain and SOB. Per nursing report, patient more SOB with therapies. CXR was obtained without acute cardiopulmonary findings. Seen today in his room in LTC up to armchair. He reports left sided rib cage pain over the past 4-5 days. He thinks pulling on grab bars in bed and bathroom have caused this and is hoping it is a soft tissue injury. He denies shortness of breath but notes the pain causes him to hold or catch his breath. He is also constipated today and requesting a laxative.  "Declined a suppository per nursing. He is denying CP, cough, changes in bladder habits, fever/chillls.     Allergies, and PMH/PSH reviewed in EPIC today.  REVIEW OF SYSTEMS:  Limited secondary to cognitive impairment but today pt reports the above    Objective:   /77   Pulse 94   Temp 97.2  F (36.2  C)   Resp 18   Ht 1.702 m (5' 7\")   Wt 78.5 kg (173 lb)   SpO2 96%   BMI 27.10 kg/m    GENERAL APPEARANCE:  Alert, in no distress  RESP:  respiratory effort and palpation of chest normal, lungs clear to auscultation , no respiratory distress  CV:  Palpation and auscultation of heart done , regular rate and rhythm, no murmur, rub, or gallop, no edema  ABDOMEN:  normal bowel sounds, soft, nontender, no hepatosplenomegaly or other masses  M/S:   Gait and station abnormal transfers with assist, wheelchair for mobility, left rib cage mildly tender with palpation, reproducible with twisting motion  SKIN:  Inspection of skin and subcutaneous tissue baseline, Palpation of skin and subcutaneous tissue baseline  NEURO:   moves extremities freely, follows commands  PSYCH:  oriented x 1-2    Labs done in SNF are in [facility]. Please refer to them using [employer]/Care Everywhere. and Recent labs in [facility] reviewed by me today.     Assessment/Plan:  (C61,  C79.51) Prostate cancer metastatic to bone (H)  (primary encounter diagnosis)  (R53.81) Physical deconditioning  (W19.XXXD) Fall, subsequent encounter  (R07.81) Rib pain   Comment: Chronic prostate cancer with widespread metastasis to the spine and skull, specifically left temporal bone. Known metastasis to ribs, may be contributing to pain vs soft tissue injury. CXR negative for acute cardiopulmonary process, or osseous abnormality. Recent progression while on Zytiga/prednisone.  Progressive deconditioning and physical decline resulting in several falls outpatient, CT of the head without acute findings.  In TCU for rehab with plan to start Taxotere in approximately 2 " weeks  -adjusting pain regimen, management discussed with nursing facility staff today  Plan: Discontinue tylenol. Start tylenol 1000 mg TID. Add lidocaine 4% topically to left rib cage. Continue oxycodone PRN for pain.   Continue prednisone 5 mg every other day until 9/18, then discontinue. PT/OT, follow-up with oncology per recommendations.      (E11.69) Type 2 diabetes mellitus with other specified complication, without long-term current use of insulin (H)  Comment: Chronic, A1c 8.4.  Started on NPH with PTA metformin.  There was consideration of SGLT2 or GLP-1 but given pending chemotherapy opted for insulin which can be more easily held in the setting of poor oral intakes.  Prednisone decreased 10>5 mg daily>now 5 mg every other day.  -recent BG typically low 200s  Plan: Monitor blood glucose 4 times daily, metformin 1000 mg daily, NPH 5 units daily with aspart sliding scale.      (I10) Essential hypertension  Comment: Chronic, mildly hypertensive on hospital admission, readings in TCU normal low.  Amlodipine discontinued.   BP Readings from Last 3 Encounters:   09/22/23 136/77   09/18/23 107/65   09/11/23 122/69   Plan: Discontinue amlodipine, monitor BP off of antihypertensives with adjustments as needed.       (E78.5) Dyslipidemia  Comment: Chronic  Plan: Continue rosuvastatin     (F41.9) Anxiety  (F32.A) Depression, unspecified depression type  Comment: Chronic, mood okay  Plan: Continue sertraline 100 mg daily, Cymbalta 20 mg twice daily, melatonin for sleep, ativan PRN for anxiety, monitor mood, behaviors, sleep habits     (R41.89) Cognitive impairment  Comment: Slums 9/30 on 9/8.  Plan: SNF for assist with ADLs, medication management, meals, activities.  OT for formal discharge recommendations     (D64.9) Anemia, unspecified type  Comment: Chronic, mild  Plan: CBC 9/11, monitor hemoglobin periodically     (K59.01) Slow transit constipation  Comment: Chronic, opiate use and immobility contributing  -see  on med rec senna was started instead of senna-s as previously ordered, re-ordered  Plan: Stop senna. Start senna S1 tab daily, continue twice daily as needed      Orders:  Discontinue current tylenol orders  Start tylenol 1000 mg TID  Update oxy parameters to 2.5 mg for pain 1-5/10, 5 mg for pain 6-10/10  Discontinue senna  Start senna-s one tab daily, continue PRN  Lidocaine 4$ patch to left rib cage daily, on 12/off 12    Electronically signed by: SANDRA Harry CNP

## 2023-09-22 NOTE — LETTER
9/22/2023        RE: Migue Beach  [address]  [facility] [address]        [facility] GERIATR[facility]    Chief Complaint   Patient presents with     RECHECK     HPI:  Migue Beach is a 77 year old  (1945), who is being seen today for an episodic care visit at: [facility]  () [[id number]].     By chart review, patient presented to the ED with confusion.  Family reported increased fatigue and weakness over the last month culminating in an several falls 8/30.  He has a history of prostate cancer with metastasis on opiates for chronic back pain.  In ED, work-up remarkable for glucose 216, creatinine 0.65, white 0.2, hemoglobin 12.  UA was unremarkable.  CT demonstrated presumed chronic microvascular changes, no acute intracranial process.  MRI of the brain with numerous osseous metastasis and patchy meningeal enhancement underlying the left parietal calvarial lesion, reactive versus meningeal spread of disease.  Chest x-ray without acute cardiopulmonary findings.  Thoracic and lumbar MRI demonstrated widespread osseous metastasis, 5 mm intradural extramedullary lesion at T9-10 and left supraclavicular and retroperitoneal metastasis.  He was seen by oncology and therapy team, recommended TCU to improve strength before starting an alternative regimen.  Prednisone was decreased to 5 mg daily.  A1c was elevated at 8.4 and he was started on NPH. He was admitted to the above facility for ongoing rehab, medical management.      Today's concern is: Seen today at nursing/patient request for left rib pain and SOB. Per nursing report, patient more SOB with therapies. CXR was obtained without acute cardiopulmonary findings. Seen today in his room in LTC up to armchair. He reports left sided rib cage pain over the past 4-5 days. He thinks pulling on grab bars in bed and bathroom have caused this and is hoping it is a soft tissue injury. He denies shortness of breath but notes the pain causes  "him to hold or catch his breath. He is also constipated today and requesting a laxative. Declined a suppository per nursing. He is denying CP, cough, changes in bladder habits, fever/chillls.     Allergies, and PMH/PSH reviewed in EPIC today.  REVIEW OF SYSTEMS:  Limited secondary to cognitive impairment but today pt reports the above    Objective:   /77   Pulse 94   Temp 97.2  F (36.2  C)   Resp 18   Ht 1.702 m (5' 7\")   Wt 78.5 kg (173 lb)   SpO2 96%   BMI 27.10 kg/m    GENERAL APPEARANCE:  Alert, in no distress  RESP:  respiratory effort and palpation of chest normal, lungs clear to auscultation , no respiratory distress  CV:  Palpation and auscultation of heart done , regular rate and rhythm, no murmur, rub, or gallop, no edema  ABDOMEN:  normal bowel sounds, soft, nontender, no hepatosplenomegaly or other masses  M/S:   Gait and station abnormal transfers with assist, wheelchair for mobility, left rib cage mildly tender with palpation, reproducible with twisting motion  SKIN:  Inspection of skin and subcutaneous tissue baseline, Palpation of skin and subcutaneous tissue baseline  NEURO:   moves extremities freely, follows commands  PSYCH:  oriented x 1-2    Labs done in SNF are in [facility]. Please refer to them using [employer] Everywhere. and Recent labs in [facility] reviewed by me today.     Assessment/Plan:  (C61,  C79.51) Prostate cancer metastatic to bone (H)  (primary encounter diagnosis)  (R53.81) Physical deconditioning  (W19.XXXD) Fall, subsequent encounter  (R07.81) Rib pain   Comment: Chronic prostate cancer with widespread metastasis to the spine and skull, specifically left temporal bone. Known metastasis to ribs, may be contributing to pain vs soft tissue injury. CXR negative for acute cardiopulmonary process, or osseous abnormality. Recent progression while on Zytiga/prednisone.  Progressive deconditioning and physical decline resulting in several falls outpatient, CT of the head " without acute findings.  In TCU for rehab with plan to start Taxotere in approximately 2 weeks  -adjusting pain regimen, management discussed with nursing facility staff today  Plan: Discontinue tylenol. Start tylenol 1000 mg TID. Add lidocaine 4% topically to left rib cage. Continue oxycodone PRN for pain.   Continue prednisone 5 mg every other day until 9/18, then discontinue. PT/OT, follow-up with oncology per recommendations.      (E11.69) Type 2 diabetes mellitus with other specified complication, without long-term current use of insulin (H)  Comment: Chronic, A1c 8.4.  Started on NPH with PTA metformin.  There was consideration of SGLT2 or GLP-1 but given pending chemotherapy opted for insulin which can be more easily held in the setting of poor oral intakes.  Prednisone decreased 10>5 mg daily>now 5 mg every other day.  -recent BG typically low 200s  Plan: Monitor blood glucose 4 times daily, metformin 1000 mg daily, NPH 5 units daily with aspart sliding scale.      (I10) Essential hypertension  Comment: Chronic, mildly hypertensive on hospital admission, readings in TCU normal low.  Amlodipine discontinued.   BP Readings from Last 3 Encounters:   09/22/23 136/77   09/18/23 107/65   09/11/23 122/69   Plan: Discontinue amlodipine, monitor BP off of antihypertensives with adjustments as needed.       (E78.5) Dyslipidemia  Comment: Chronic  Plan: Continue rosuvastatin     (F41.9) Anxiety  (F32.A) Depression, unspecified depression type  Comment: Chronic, mood okay  Plan: Continue sertraline 100 mg daily, Cymbalta 20 mg twice daily, melatonin for sleep, ativan PRN for anxiety, monitor mood, behaviors, sleep habits     (R41.89) Cognitive impairment  Comment: Slums 9/30 on 9/8.  Plan: SNF for assist with ADLs, medication management, meals, activities.  OT for formal discharge recommendations     (D64.9) Anemia, unspecified type  Comment: Chronic, mild  Plan: CBC 9/11, monitor hemoglobin periodically     (K59.01)  Slow transit constipation  Comment: Chronic, opiate use and immobility contributing  -see on med rec senna was started instead of senna-s as previously ordered, re-ordered  Plan: Stop senna. Start senna S1 tab daily, continue twice daily as needed      Orders:  Discontinue current tylenol orders  Start tylenol 1000 mg TID  Update oxy parameters to 2.5 mg for pain 1-5/10, 5 mg for pain 6-10/10  Discontinue senna  Start senna-s one tab daily, continue PRN  Lidocaine 4$ patch to left rib cage daily, on 12/off 12    Electronically signed by: SANDRA Harry CNP         Sincerely,        SANDRA Harry CNP

## 2023-09-25 NOTE — LETTER
"    9/25/2023        RE: Migue Beach  [address]  [facility] [id number]        [facility] GERIATR[facility]    Chief Complaint   Patient presents with     Nursing Home Acute     HPI:  Migue Beach is a 77 year old  (1945), who is being seen today for an episodic care visit at: [facility]  ([facility]) [[id number]].        By chart review, patient presented to the ED with confusion.  Family reported increased fatigue and weakness over the last month culminating in an several falls 8/30.  He has a history of prostate cancer with metastasis on opiates for chronic back pain.  In ED, work-up remarkable for glucose 216, creatinine 0.65, white 0.2, hemoglobin 12.  UA was unremarkable.  CT demonstrated presumed chronic microvascular changes, no acute intracranial process.  MRI of the brain with numerous osseous metastasis and patchy meningeal enhancement underlying the left parietal calvarial lesion, reactive versus meningeal spread of disease.  Chest x-ray without acute cardiopulmonary findings.  Thoracic and lumbar MRI demonstrated widespread osseous metastasis, 5 mm intradural extramedullary lesion at T9-10 and left supraclavicular and retroperitoneal metastasis.  He was seen by oncology and therapy team, recommended TCU to improve strength before starting an alternative regimen.  Prednisone was decreased to 5 mg daily.  A1c was elevated at 8.4 and he was started on NPH. He was admitted to the above facility for ongoing rehab, medical management.     Today's concern is: Seen today for follow-up in TCU up to his arm chair. Regarding pain he is still having left rib pain under the arm, concerned xray was taken of \"the wrong side.\" He wishes his wife were here because he is having trouble keeping track of details. He is moving around better today and per nursing they have noted functional improvements in pain. He is hopeful to schedule oxycodone because he struggles to keep track  of timing. He " "notes urinary frequency.  Reports a bowel movement yesterday. He is denying CP, SOB, changes in b/b habits, fever/chills.     Allergies, and PMH/PSH reviewed in EPIC today.  REVIEW OF SYSTEMS:  Limited secondary to cognitive impairment but today pt reports the above    Objective:   /65   Pulse 96   Temp 97.5  F (36.4  C)   Resp 18   Ht 1.702 m (5' 7\")   Wt 78.5 kg (173 lb)   SpO2 96%   BMI 27.10 kg/m    GENERAL APPEARANCE:  Alert, in no distress  RESP:  respiratory effort and palpation of chest normal, lungs clear to auscultation , no respiratory distress  CV:  Palpation and auscultation of heart done , regular rate and rhythm, no murmur, rub, or gallop, no edema  ABDOMEN:  normal bowel sounds, soft, nontender, no hepatosplenomegaly or other masses  M/S:   Gait and station abnormal transfers with assist, wheelchair for mobility, left rib cage non-tender on exam, no ROM limitation by pain  SKIN:  Inspection of skin and subcutaneous tissue baseline, Palpation of skin and subcutaneous tissue baseline, lidocaine patch on left rib cage  NEURO:   robins freely, follows simple commands  PSYCH:  insight and judgement impaired, memory impaired     Labs done in SNF are in [facility]. Please refer to them using [employer] Everywhere. and Recent labs in [facility] reviewed by me today.     Assessment/Plan:  (C61,  C79.51) Prostate cancer metastatic to bone (H)  (primary encounter diagnosis)  (R53.81) Physical deconditioning  (W19.XXXD) Fall, subsequent encounter  (R07.81) Rib pain   Comment: Chronic prostate cancer with widespread metastasis to the spine and skull, specifically left temporal bone. Known metastasis to ribs, may be contributing to pain vs soft tissue injury. CXR negative for acute cardiopulmonary process, or osseous abnormality. Personally reviewed CXR today. Recent disease progression while on Zytiga/prednisone.  Progressive deconditioning and physical decline resulting in several falls outpatient, CT of " the head without acute findings.  In TCU for rehab with plan to start Taxotere Thrusday - nursing report patient has pre-chemo dexamethasone.   -patient reports ongoing pain, seems improved. Independent historian utilized due to cognitive impairment, spoke with patient's wife Blanca who also reports pain is seems improved, as do nursing and therapy today, management discussed on site. Will schedule oxycodone, hold for sedation, naloxone PRN.  -attempted to reach patient's son x3 today, unable to provide an update, wife updated as above.  Plan: Continue tylenol 1000 mg TID. Add lidocaine 4% topically to left rib cage. Start oxycodone 5 mg QID, Continue oxycodone PRN BID for pain. Naloxone available per nursing. PT/OT, follow-up with oncology per recommendations. Dexamethasone per oncology orders, requested nursing clarify orders with oncology today.      (E11.69) Type 2 diabetes mellitus with other specified complication, without long-term current use of insulin (H)  Comment: Chronic, A1c 8.4.  Started on NPH with PTA metformin.  There was consideration of SGLT2 or GLP-1 but given pending chemotherapy opted for insulin which can be more easily held in the setting of poor oral intakes.  Prednisone decreased 10>5 mg daily>now 5 mg every other day.  -recent -250s  Plan: Monitor blood glucose 4 times daily, metformin 1000 mg daily, NPH 5 units daily with aspart sliding scale.      (I10) Essential hypertension  Comment: Chronic, mildly hypertensive on hospital admission, readings in TCU normal low.  Amlodipine discontinued.   BP Readings from Last 3 Encounters:   09/25/23 122/65   09/22/23 136/77   09/18/23 107/65   Plan: monitor BP off of antihypertensives with adjustments as needed.       (E78.5) Dyslipidemia  Comment: Chronic  Plan: Continue rosuvastatin     (F41.9) Anxiety  (F32.A) Depression, unspecified depression type  Comment: Chronic, mood okay. Anxious about his memory, wife updated today.   Plan:  Continue sertraline 100 mg daily, Cymbalta 20 mg twice daily, melatonin for sleep, ativan PRN for anxiety, monitor mood, behaviors, sleep habits     (R41.89) Cognitive impairment  Comment: Slums 9/30 on 9/8.  Plan: SNF for assist with ADLs, medication management, meals, activities.  OT for formal discharge recommendations     (D64.9) Anemia, unspecified type  Comment: Chronic, mild. Hgb stable today  Hemoglobin   Date Value Ref Range Status   09/25/2023 11.3 (L) 13.3 - 17.7 g/dL Final   09/18/2023 11.3 (L) 13.3 - 17.7 g/dL Final   06/21/2017 12.8 (L) 13.3 - 17.7 g/dL Final   04/04/2010 15.1 13.3 - 17.7 g/dL Final   Plan: monitor hemoglobin periodically     (K59.01) Slow transit constipation  Comment: Chronic, opiate use and immobility contributing. Improved on scheduled senna-s  Plan: Continue senna S1 tab daily, continue twice daily as needed    Orders:  Discontinue current oxycodone  Oxycodone 5 mg QID and BID PRN  Naloxone 0.4 mg/m give 0.4 mg QSQ Q2 min PRN for somnolence/respiratory sedation    Electronically signed by: SANDRA Harry CNP             Sincerely,        SANDRA Harry CNP

## 2023-09-25 NOTE — PROGRESS NOTES
"[facility] GERIATRICS    Chief Complaint   Patient presents with    Nursing Home Acute     HPI:  Migue Beach is a 77 year old  (1945), who is being seen today for an episodic care visit at: [facility]  ([facility]) [[id number]].        By chart review, patient presented to the ED with confusion.  Family reported increased fatigue and weakness over the last month culminating in an several falls 8/30.  He has a history of prostate cancer with metastasis on opiates for chronic back pain.  In ED, work-up remarkable for glucose 216, creatinine 0.65, white 0.2, hemoglobin 12.  UA was unremarkable.  CT demonstrated presumed chronic microvascular changes, no acute intracranial process.  MRI of the brain with numerous osseous metastasis and patchy meningeal enhancement underlying the left parietal calvarial lesion, reactive versus meningeal spread of disease.  Chest x-ray without acute cardiopulmonary findings.  Thoracic and lumbar MRI demonstrated widespread osseous metastasis, 5 mm intradural extramedullary lesion at T9-10 and left supraclavicular and retroperitoneal metastasis.  He was seen by oncology and therapy team, recommended TCU to improve strength before starting an alternative regimen.  Prednisone was decreased to 5 mg daily.  A1c was elevated at 8.4 and he was started on NPH. He was admitted to the above facility for ongoing rehab, medical management.     Today's concern is: Seen today for follow-up in TCU up to his arm chair. Regarding pain he is still having left rib pain under the arm, concerned xray was taken of \"the wrong side.\" He wishes his wife were here because he is having trouble keeping track of details. He is moving around better today and per nursing they have noted functional improvements in pain. He is hopeful to schedule oxycodone because he struggles to keep track  of timing. He notes urinary frequency.  Reports a bowel movement yesterday. He is denying CP, SOB, changes in " "b/b habits, fever/chills.     Allergies, and PMH/PSH reviewed in EPIC today.  REVIEW OF SYSTEMS:  Limited secondary to cognitive impairment but today pt reports the above    Objective:   /65   Pulse 96   Temp 97.5  F (36.4  C)   Resp 18   Ht 1.702 m (5' 7\")   Wt 78.5 kg (173 lb)   SpO2 96%   BMI 27.10 kg/m    GENERAL APPEARANCE:  Alert, in no distress  RESP:  respiratory effort and palpation of chest normal, lungs clear to auscultation , no respiratory distress  CV:  Palpation and auscultation of heart done , regular rate and rhythm, no murmur, rub, or gallop, no edema  ABDOMEN:  normal bowel sounds, soft, nontender, no hepatosplenomegaly or other masses  M/S:   Gait and station abnormal transfers with assist, wheelchair for mobility, left rib cage non-tender on exam, no ROM limitation by pain  SKIN:  Inspection of skin and subcutaneous tissue baseline, Palpation of skin and subcutaneous tissue baseline, lidocaine patch on left rib cage  NEURO:   robins freely, follows simple commands  PSYCH:  insight and judgement impaired, memory impaired     Labs done in SNF are in [facility]. Please refer to them using [employer]/Care Everywhere. and Recent labs in [facility] reviewed by me today.     Assessment/Plan:  (C61,  C79.51) Prostate cancer metastatic to bone (H)  (primary encounter diagnosis)  (R53.81) Physical deconditioning  (W19.XXXD) Fall, subsequent encounter  (R07.81) Rib pain   Comment: Chronic prostate cancer with widespread metastasis to the spine and skull, specifically left temporal bone. Known metastasis to ribs, may be contributing to pain vs soft tissue injury. CXR negative for acute cardiopulmonary process, or osseous abnormality. Personally reviewed CXR today. Recent disease progression while on Zytiga/prednisone.  Progressive deconditioning and physical decline resulting in several falls outpatient, CT of the head without acute findings.  In TCU for rehab with plan to start Taxotere Thrusday - nursing " report patient has pre-chemo dexamethasone.   -patient reports ongoing pain, seems improved. Independent historian utilized due to cognitive impairment, spoke with patient's wife Blanca who also reports pain is seems improved, as do nursing and therapy today, management discussed on site. Will schedule oxycodone, hold for sedation, naloxone PRN.  -attempted to reach patient's son x3 today, unable to provide an update, wife updated as above.  Plan: Continue tylenol 1000 mg TID. Add lidocaine 4% topically to left rib cage. Start oxycodone 5 mg QID, Continue oxycodone PRN BID for pain. Naloxone available per nursing. PT/OT, follow-up with oncology per recommendations. Dexamethasone per oncology orders, requested nursing clarify orders with oncology today.      (E11.69) Type 2 diabetes mellitus with other specified complication, without long-term current use of insulin (H)  Comment: Chronic, A1c 8.4.  Started on NPH with PTA metformin.  There was consideration of SGLT2 or GLP-1 but given pending chemotherapy opted for insulin which can be more easily held in the setting of poor oral intakes.  Prednisone decreased 10>5 mg daily>now 5 mg every other day.  -recent -250s  Plan: Monitor blood glucose 4 times daily, metformin 1000 mg daily, NPH 5 units daily with aspart sliding scale.      (I10) Essential hypertension  Comment: Chronic, mildly hypertensive on hospital admission, readings in TCU normal low.  Amlodipine discontinued.   BP Readings from Last 3 Encounters:   09/25/23 122/65   09/22/23 136/77   09/18/23 107/65   Plan: monitor BP off of antihypertensives with adjustments as needed.       (E78.5) Dyslipidemia  Comment: Chronic  Plan: Continue rosuvastatin     (F41.9) Anxiety  (F32.A) Depression, unspecified depression type  Comment: Chronic, mood okay. Anxious about his memory, wife updated today.   Plan: Continue sertraline 100 mg daily, Cymbalta 20 mg twice daily, melatonin for sleep, ativan PRN for  anxiety, monitor mood, behaviors, sleep habits     (R41.89) Cognitive impairment  Comment: Slums 9/30 on 9/8.  Plan: SNF for assist with ADLs, medication management, meals, activities.  OT for formal discharge recommendations     (D64.9) Anemia, unspecified type  Comment: Chronic, mild. Hgb stable today  Hemoglobin   Date Value Ref Range Status   09/25/2023 11.3 (L) 13.3 - 17.7 g/dL Final   09/18/2023 11.3 (L) 13.3 - 17.7 g/dL Final   06/21/2017 12.8 (L) 13.3 - 17.7 g/dL Final   04/04/2010 15.1 13.3 - 17.7 g/dL Final   Plan: monitor hemoglobin periodically     (K59.01) Slow transit constipation  Comment: Chronic, opiate use and immobility contributing. Improved on scheduled senna-s  Plan: Continue senna S1 tab daily, continue twice daily as needed    Orders:  Discontinue current oxycodone  Oxycodone 5 mg QID and BID PRN  Naloxone 0.4 mg/m give 0.4 mg QSQ Q2 min PRN for somnolence/respiratory sedation    Electronically signed by: SANDRA Harry CNP

## 2023-10-02 NOTE — LETTER
"    10/2/2023        RE: Migue Beach  [address]  [facility] [id number]        [facility] GERIATRICS    Chief Complaint   Patient presents with     RECHECK     HPI:  Migue Beach is a 77 year old  (1945), who is being seen today for an episodic care visit at: [facility]  ([facility]) [[id number]].     By chart review, patient presented to the ED with confusion.  Family reported increased fatigue and weakness over the last month culminating in an several falls 8/30.  He has a history of prostate cancer with metastasis on opiates for chronic back pain.  In ED, work-up remarkable for glucose 216, creatinine 0.65, white 0.2, hemoglobin 12.  UA was unremarkable.  CT demonstrated presumed chronic microvascular changes, no acute intracranial process.  MRI of the brain with numerous osseous metastasis and patchy meningeal enhancement underlying the left parietal calvarial lesion, reactive versus meningeal spread of disease.  Chest x-ray without acute cardiopulmonary findings.  Thoracic and lumbar MRI demonstrated widespread osseous metastasis, 5 mm intradural extramedullary lesion at T9-10 and left supraclavicular and retroperitoneal metastasis.  He was seen by oncology and therapy team, recommended TCU to improve strength before starting an alternative regimen.  Prednisone was decreased to 5 mg daily.  A1c was elevated at 8.4 and he was started on NPH. He was admitted to the above facility for ongoing rehab, medical management.      Today's concern is: Seen today for routine follow-up in TCU resting abed. Patient's spouse and son are present at bedside, requesting an alternative mattress. Patient reports his mattress is \"leaking.\" No additional concerns, denying CP, SOB, changes in b/b habits. Nursing reporting patient had some trouble over night with breakthrough pain, currently receives oxy Q4H during the day then PRN over night. He awakened at 2 AM and received PRN oxy but awake again at " "4 AM and too soon for a PRN at that time. He was given house order tylenol. Today, he is requesting oxycodone be given around the clock.     Allergies, and PMH/PSH reviewed in EPIC today.  REVIEW OF SYSTEMS:  Limited secondary to cognitive impairment but today pt reports the above    Objective:   /68   Pulse 98   Temp 97.5  F (36.4  C)   Resp 16   Ht 1.702 m (5' 7\")   Wt 78.5 kg (173 lb)   SpO2 97%   BMI 27.10 kg/m    GENERAL APPEARANCE:  Alert, in no distress  RESP:  respiratory effort and palpation of chest normal, lungs clear to auscultation , no respiratory distress  CV:  Palpation and auscultation of heart done , regular rate and rhythm, no murmur, rub, or gallop, no edema  ABDOMEN:  normal bowel sounds, soft, nontender, no hepatosplenomegaly or other masses  M/S:   Gait and station abnormal walker for mobility  SKIN:  Inspection of skin and subcutaneous tissue baseline, Palpation of skin and subcutaneous tissue baseline  NEURO:   Cranial nerves 2-12 are normal tested and grossly at patient's baseline, robins  PSYCH:  insight and judgement impaired, memory impaired     Labs done in SNF are in [facility]. Please refer to them using [employer]/Care Everywhere. and Recent labs in [facility] reviewed by me today.     Assessment/Plan:  (C61,  C79.51) Prostate cancer metastatic to bone (H)  (primary encounter diagnosis)  (W19.XXXD) Fall, subsequent encounter  (R07.81) Rib pain   Comment: Chronic prostate cancer with widespread metastasis to the spine and skull, specifically left temporal bone. Known metastasis to ribs, may be contributing to pain vs soft tissue injury. CXR negative for acute cardiopulmonary process, or osseous abnormality. Personally reviewed CXR today. Recent disease progression while on Zytiga/prednisone.  Progressive deconditioning and physical decline resulting in several falls outpatient, CT of the head without acute findings.    -pain improved since scheduling oxycodone, will schedule through " the night to avoid breakthrough pain  -Independent historian utilized due to cognitive impairment, spoke with patient's wife Blanca and son.  -Reports of nausea, fatigue since chemotherapy 10/18  Plan: Continue tylenol 1000 mg TID - limit 3 G/day. Add lidocaine 4% topically to left rib cage. Start oxycodone 5 mg Q4H. Naloxone available per nursing. PT/OT, follow-up with oncology per recommendations.      (E11.69) Type 2 diabetes mellitus with other specified complication, without long-term current use of insulin (H)  Comment: Chronic, A1c 8.4.  Started on NPH with PTA metformin.  There was consideration of SGLT2 or GLP-1 but given pending chemotherapy opted for insulin which can be more easily held in the setting of poor oral intakes.  Prednisone decreased 10>5 mg daily>now 5 mg every other day.  -recent -250s  Plan: Monitor blood glucose 4 times daily, metformin 1000 mg daily, NPH 5 units daily with aspart sliding scale.      (I10) Essential hypertension  Comment: Chronic, mildly hypertensive on hospital admission, readings in TCU normal low.  Amlodipine discontinued.   BP Readings from Last 3 Encounters:   10/05/23 110/69   10/02/23 105/68   09/25/23 122/65   Plan: monitor BP off of antihypertensives with adjustments as needed.       (E78.5) Dyslipidemia  Comment: Chronic  Plan: Continue rosuvastatin     (F41.9) Anxiety  (F32.A) Depression, unspecified depression type  Comment: Chronic, mood okay.   Plan: Continue sertraline 100 mg daily, Cymbalta 20 mg twice daily, melatonin for sleep, ativan PRN for anxiety, monitor mood, behaviors, sleep habits     (R41.89) Cognitive impairment  Comment: Slums 9/30 on 9/8.  Plan: SNF for assist with ADLs, medication management, meals, activities.  OT for formal discharge recommendations     (D64.9) Anemia, unspecified type  Comment: Chronic, mild. Hgb stable  Hemoglobin   Date Value Ref Range Status   09/25/2023 11.3 (L) 13.3 - 17.7 g/dL Final   09/18/2023 11.3 (L) 13.3  - 17.7 g/dL Final   06/21/2017 12.8 (L) 13.3 - 17.7 g/dL Final   04/04/2010 15.1 13.3 - 17.7 g/dL Final   Plan: monitor hemoglobin periodically     (K59.01) Slow transit constipation  Comment: Chronic, opiate use and immobility contributing. Improved on scheduled senna-s  Plan: Continue senna S1 tab daily, continue twice daily as needed    (R53.81) Physical deconditioning  Comment: ongoing. Has reached a plateau with therapy, will discharge to [facility] pending placement  Plan: SNF for assist with ADLs, medication management, meals, activities        Electronically signed by: SANDRA Harry CNP         Sincerely,        SANDRA Harry CNP

## 2023-10-02 NOTE — PROGRESS NOTES
"[facility] GERIATRICS    Chief Complaint   Patient presents with    RECHECK     HPI:  Migue Beach is a 77 year old  (1945), who is being seen today for an episodic care visit at: [facility]  ([facility]) [[id number]].     By chart review, patient presented to the ED with confusion.  Family reported increased fatigue and weakness over the last month culminating in an several falls 8/30.  He has a history of prostate cancer with metastasis on opiates for chronic back pain.  In ED, work-up remarkable for glucose 216, creatinine 0.65, white 0.2, hemoglobin 12.  UA was unremarkable.  CT demonstrated presumed chronic microvascular changes, no acute intracranial process.  MRI of the brain with numerous osseous metastasis and patchy meningeal enhancement underlying the left parietal calvarial lesion, reactive versus meningeal spread of disease.  Chest x-ray without acute cardiopulmonary findings.  Thoracic and lumbar MRI demonstrated widespread osseous metastasis, 5 mm intradural extramedullary lesion at T9-10 and left supraclavicular and retroperitoneal metastasis.  He was seen by oncology and therapy team, recommended TCU to improve strength before starting an alternative regimen.  Prednisone was decreased to 5 mg daily.  A1c was elevated at 8.4 and he was started on NPH. He was admitted to the above facility for ongoing rehab, medical management.      Today's concern is: Seen today for routine follow-up in U resting abed. Patient's spouse and son are present at bedside, requesting an alternative mattress. Patient reports his mattress is \"leaking.\" No additional concerns, denying CP, SOB, changes in b/b habits. Nursing reporting patient had some trouble over night with breakthrough pain, currently receives oxy Q4H during the day then PRN over night. He awakened at 2 AM and received PRN oxy but awake again at 4 AM and too soon for a PRN at that time. He was given house order tylenol. Today, he is " "requesting oxycodone be given around the clock.     Allergies, and PMH/PSH reviewed in EPIC today.  REVIEW OF SYSTEMS:  Limited secondary to cognitive impairment but today pt reports the above    Objective:   /68   Pulse 98   Temp 97.5  F (36.4  C)   Resp 16   Ht 1.702 m (5' 7\")   Wt 78.5 kg (173 lb)   SpO2 97%   BMI 27.10 kg/m    GENERAL APPEARANCE:  Alert, in no distress  RESP:  respiratory effort and palpation of chest normal, lungs clear to auscultation , no respiratory distress  CV:  Palpation and auscultation of heart done , regular rate and rhythm, no murmur, rub, or gallop, no edema  ABDOMEN:  normal bowel sounds, soft, nontender, no hepatosplenomegaly or other masses  M/S:   Gait and station abnormal walker for mobility  SKIN:  Inspection of skin and subcutaneous tissue baseline, Palpation of skin and subcutaneous tissue baseline  NEURO:   Cranial nerves 2-12 are normal tested and grossly at patient's baseline, robins  PSYCH:  insight and judgement impaired, memory impaired     Labs done in SNF are in [facility]. Please refer to them using [employer]/Care Everywhere. and Recent labs in [facility] reviewed by me today.     Assessment/Plan:  (C61,  C79.51) Prostate cancer metastatic to bone (H)  (primary encounter diagnosis)  (W19.XXXD) Fall, subsequent encounter  (R07.81) Rib pain   Comment: Chronic prostate cancer with widespread metastasis to the spine and skull, specifically left temporal bone. Known metastasis to ribs, may be contributing to pain vs soft tissue injury. CXR negative for acute cardiopulmonary process, or osseous abnormality. Personally reviewed CXR today. Recent disease progression while on Zytiga/prednisone.  Progressive deconditioning and physical decline resulting in several falls outpatient, CT of the head without acute findings.    -pain improved since scheduling oxycodone, will schedule through the night to avoid breakthrough pain  -Independent historian utilized due to cognitive " impairment, spoke with patient's wife Blanca and son.  -Reports of nausea, fatigue since chemotherapy 10/18  Plan: Continue tylenol 1000 mg TID - limit 3 G/day. Add lidocaine 4% topically to left rib cage. Start oxycodone 5 mg Q4H. Naloxone available per nursing. PT/OT, follow-up with oncology per recommendations.      (E11.69) Type 2 diabetes mellitus with other specified complication, without long-term current use of insulin (H)  Comment: Chronic, A1c 8.4.  Started on NPH with PTA metformin.  There was consideration of SGLT2 or GLP-1 but given pending chemotherapy opted for insulin which can be more easily held in the setting of poor oral intakes.  Prednisone decreased 10>5 mg daily>now 5 mg every other day.  -recent -250s  Plan: Monitor blood glucose 4 times daily, metformin 1000 mg daily, NPH 5 units daily with aspart sliding scale.      (I10) Essential hypertension  Comment: Chronic, mildly hypertensive on hospital admission, readings in TCU normal low.  Amlodipine discontinued.   BP Readings from Last 3 Encounters:   10/05/23 110/69   10/02/23 105/68   09/25/23 122/65   Plan: monitor BP off of antihypertensives with adjustments as needed.       (E78.5) Dyslipidemia  Comment: Chronic  Plan: Continue rosuvastatin     (F41.9) Anxiety  (F32.A) Depression, unspecified depression type  Comment: Chronic, mood okay.   Plan: Continue sertraline 100 mg daily, Cymbalta 20 mg twice daily, melatonin for sleep, ativan PRN for anxiety, monitor mood, behaviors, sleep habits     (R41.89) Cognitive impairment  Comment: Slums 9/30 on 9/8.  Plan: SNF for assist with ADLs, medication management, meals, activities.  OT for formal discharge recommendations     (D64.9) Anemia, unspecified type  Comment: Chronic, mild. Hgb stable  Hemoglobin   Date Value Ref Range Status   09/25/2023 11.3 (L) 13.3 - 17.7 g/dL Final   09/18/2023 11.3 (L) 13.3 - 17.7 g/dL Final   06/21/2017 12.8 (L) 13.3 - 17.7 g/dL Final   04/04/2010 15.1 13.3 -  17.7 g/dL Final   Plan: monitor hemoglobin periodically     (K59.01) Slow transit constipation  Comment: Chronic, opiate use and immobility contributing. Improved on scheduled senna-s  Plan: Continue senna S1 tab daily, continue twice daily as needed    (R53.81) Physical deconditioning  Comment: ongoing. Has reached a plateau with therapy, will discharge to AL pending placement  Plan: SNF for assist with ADLs, medication management, meals, activities        Electronically signed by: SANDRA Harry CNP

## 2023-10-05 NOTE — CONFIDENTIAL NOTE
[facility] Geriatrics Telephone Encounter    Reason for Call: pt c/o ongoing pain, had oxycodone changed to 5mg q4h scheduled on visit 10/2. Reviewed chart, has metastatic prostate CA on chronic opioids for pain. Ordered one-time additional dose of oxycodone 2.5mg for overnight pain, will need to address pain management with primary team.    Electronically signed by Cortez Baez PA-C

## 2023-10-05 NOTE — PROGRESS NOTES
[facility] GERIATRICS    Chief Complaint   Patient presents with    Nursing Home Acute     HPI:  Migue Beach is a 77 year old  (1945), who is being seen today for an episodic care visit at: [facility]  ([facility]) [[id number]].     By chart review, patient presented to the ED with confusion.  Family reported increased fatigue and weakness over the last month culminating in an several falls 8/30.  He has a history of prostate cancer with metastasis on opiates for chronic back pain.  In ED, work-up remarkable for glucose 216, creatinine 0.65, white 0.2, hemoglobin 12.  UA was unremarkable.  CT demonstrated presumed chronic microvascular changes, no acute intracranial process.  MRI of the brain with numerous osseous metastasis and patchy meningeal enhancement underlying the left parietal calvarial lesion, reactive versus meningeal spread of disease.  Chest x-ray without acute cardiopulmonary findings.  Thoracic and lumbar MRI demonstrated widespread osseous metastasis, 5 mm intradural extramedullary lesion at T9-10 and left supraclavicular and retroperitoneal metastasis.  He was seen by oncology and therapy team, recommended U to improve strength before starting an alternative regimen.  Prednisone was decreased to 5 mg daily.  A1c was elevated at 8.4 and he was started on NPH. He was admitted to the above facility for ongoing rehab, medical management.       Today's concern is: Seen today for follow-up in TCU up in his room packing. Patient reports he is practicing being more independent and moving things from the bed and closet into piles on the floor. He notes ongoing back pain, finds oxycodone effective. He is denying CP, SOB, changes in b/b habits, fever/chills, dizziness or light headedness. Discussed management with nursing, pain is better controlled with fewer complaints and improved pain scale rankings. Apparently at request of family on-call provider was called last night and gave orders  "for a one time dose of 2.5 mg oxycodone but this was never given as patient was resting comfortably. Patient has been active about his room bending and twisting and nursing have counseled him to call for assistance with such tasks.     Allergies, and PMH/PSH reviewed in EPIC today.  REVIEW OF SYSTEMS:  4 point ROS including Respiratory, CV, GI and , other than that noted in the HPI,  is negative    Objective:   /69   Pulse 73   Temp 97.2  F (36.2  C)   Resp 18   Ht 1.702 m (5' 7\")   Wt 78.7 kg (173 lb 6.4 oz)   SpO2 94%   BMI 27.16 kg/m    GENERAL APPEARANCE:  Alert, in no distress  RESP:  respiratory effort and palpation of chest normal, lungs clear to auscultation , no respiratory distress  CV:  Palpation and auscultation of heart done , regular rate and rhythm, no murmur, rub, or gallop, no edema  ABDOMEN:  normal bowel sounds, soft, nontender, no hepatosplenomegaly or other masses  M/S:   Gait and station abnormal transfers with assist, walker for mobility  SKIN:  Inspection of skin and subcutaneous tissue baseline, Palpation of skin and subcutaneous tissue baseline  NEURO:   robins freely, follows commands  PSYCH:  insight and judgement impaired, memory impaired     Labs done in SNF are in [facility]. Please refer to them using [employer]/Care Everywhere. and Recent labs in EPIC reviewed by me today.     Assessment/Plan:  (C61,  C79.51) Prostate cancer metastatic to bone (H)  (primary encounter diagnosis)  (W19.XXXD) Fall, subsequent encounter  (R07.81) Rib pain   Comment: Chronic prostate cancer with widespread metastasis to the spine and skull, specifically left temporal bone. Known metastasis to ribs, may be contributing to pain vs soft tissue injury. CXR negative for acute cardiopulmonary process, or osseous abnormality. Personally reviewed CXR today. Recent disease progression while on Zytiga/prednisone.  Progressive deconditioning and physical decline resulting in several falls outpatient, CT of " the head without acute findings.    -pain improved since scheduling oxycodone, reviewed pain levels today, almost always ranked <9, more frequently in mid range 3-6 since scheduling oxycodone and with frequent ratings of 0/10. Family is requesting his dose be increased to 10 mg Q4H, sounds like pain concerns predominantly driven by family and otherwise well controlled per patient/nursing. Concerned a drastic increase in oxy will cause more confusion and falls, hospitalized with the same after increasing his dose at home, especially given functional improvement. No changes today. Nursing called at 11:30 with increased pain, due for scheduled dose, requested they call me back if pain is still elevated on re-assessment. Counseled patient to avoid bending/lifting/twisting, especially without staff supervision  -Reports of nausea, fatigue since chemotherapy 10/18  Plan: Continue tylenol 1000 mg TID - limit 3 G/day. Add lidocaine 4% topically to left rib cage. Continue oxycodone 5 mg Q4H. Naloxone available per nursing. PT/OT, follow-up with oncology per recommendations.      (E11.69) Type 2 diabetes mellitus with other specified complication, without long-term current use of insulin (H)  Comment: Chronic, A1c 8.4.  Started on NPH with PTA metformin.  There was consideration of SGLT2 or GLP-1 but given pending chemotherapy opted for insulin which can be more easily held in the setting of poor oral intakes.  Prednisone decreased 10>5 mg daily>now 5 mg every other day.  -recent -250s  Plan: Monitor blood glucose 4 times daily, metformin 1000 mg daily, NPH 5 units daily with aspart sliding scale.      (I10) Essential hypertension  Comment: Chronic, mildly hypertensive on hospital admission, readings in TCU normal low.  Amlodipine discontinued.       BP Readings from Last 3 Encounters:   10/05/23 110/69   10/02/23 105/68   09/25/23 122/65   Plan: monitor BP off of antihypertensives with adjustments as needed.        (E78.5) Dyslipidemia  Comment: Chronic  Plan: Continue rosuvastatin     (F41.9) Anxiety  (F32.A) Depression, unspecified depression type  Comment: Chronic, mood okay.   Plan: Continue sertraline 100 mg daily, Cymbalta 20 mg twice daily, melatonin for sleep, ativan PRN for anxiety, monitor mood, behaviors, sleep habits     (R41.89) Cognitive impairment  Comment: Slums 9/30 on 9/8.  Plan: SNF for assist with ADLs, medication management, meals, activities.  OT for formal discharge recommendations     (D64.9) Anemia, unspecified type  Comment: Chronic, mild. Hgb stable        Hemoglobin   Date Value Ref Range Status   09/25/2023 11.3 (L) 13.3 - 17.7 g/dL Final   09/18/2023 11.3 (L) 13.3 - 17.7 g/dL Final   06/21/2017 12.8 (L) 13.3 - 17.7 g/dL Final   04/04/2010 15.1 13.3 - 17.7 g/dL Final   Plan: monitor hemoglobin periodically     (K59.01) Slow transit constipation  Comment: Chronic, opiate use and immobility contributing. Improved on scheduled senna-s  Plan: Continue senna S1 tab daily, continue twice daily as needed     (R53.81) Physical deconditioning  Comment: ongoing. Has reached a plateau with therapy, will discharge to AL pending placement  Plan: SNF for assist with ADLs, medication management, meals, activities    Electronically signed by: SANDRA Harry CNP

## 2023-10-05 NOTE — LETTER
10/5/2023        RE: Migue Beach  [address]  [facility] [address]        [facility] GERIATRICS    Chief Complaint   Patient presents with     Nursing Home Acute     HPI:  Migue Beach is a 77 year old  (1945), who is being seen today for an episodic care visit at: [facility]  ([facility]) [[id number]].     By chart review, patient presented to the ED with confusion.  Family reported increased fatigue and weakness over the last month culminating in an several falls 8/30.  He has a history of prostate cancer with metastasis on opiates for chronic back pain.  In ED, work-up remarkable for glucose 216, creatinine 0.65, white 0.2, hemoglobin 12.  UA was unremarkable.  CT demonstrated presumed chronic microvascular changes, no acute intracranial process.  MRI of the brain with numerous osseous metastasis and patchy meningeal enhancement underlying the left parietal calvarial lesion, reactive versus meningeal spread of disease.  Chest x-ray without acute cardiopulmonary findings.  Thoracic and lumbar MRI demonstrated widespread osseous metastasis, 5 mm intradural extramedullary lesion at T9-10 and left supraclavicular and retroperitoneal metastasis.  He was seen by oncology and therapy team, recommended TCU to improve strength before starting an alternative regimen.  Prednisone was decreased to 5 mg daily.  A1c was elevated at 8.4 and he was started on NPH. He was admitted to the above facility for ongoing rehab, medical management.       Today's concern is: Seen today for follow-up in TCU up in his room packing. Patient reports he is practicing being more independent and moving things from the bed and closet into piles on the floor. He notes ongoing back pain, finds oxycodone effective. He is denying CP, SOB, changes in b/b habits, fever/chills, dizziness or light headedness. Discussed management with nursing, pain is better controlled with fewer complaints and improved pain scale  "rankings. Apparently at request of family on-call provider was called last night and gave orders for a one time dose of 2.5 mg oxycodone but this was never given as patient was resting comfortably. Patient has been active about his room bending and twisting and nursing have counseled him to call for assistance with such tasks.     Allergies, and PMH/PSH reviewed in EPIC today.  REVIEW OF SYSTEMS:  4 point ROS including Respiratory, CV, GI and , other than that noted in the HPI,  is negative    Objective:   /69   Pulse 73   Temp 97.2  F (36.2  C)   Resp 18   Ht 1.702 m (5' 7\")   Wt 78.7 kg (173 lb 6.4 oz)   SpO2 94%   BMI 27.16 kg/m    GENERAL APPEARANCE:  Alert, in no distress  RESP:  respiratory effort and palpation of chest normal, lungs clear to auscultation , no respiratory distress  CV:  Palpation and auscultation of heart done , regular rate and rhythm, no murmur, rub, or gallop, no edema  ABDOMEN:  normal bowel sounds, soft, nontender, no hepatosplenomegaly or other masses  M/S:   Gait and station abnormal transfers with assist, walker for mobility  SKIN:  Inspection of skin and subcutaneous tissue baseline, Palpation of skin and subcutaneous tissue baseline  NEURO:   robins freely, follows commands  PSYCH:  insight and judgement impaired, memory impaired     Labs done in SNF are in [facility]. Please refer to them using [employer] Everywhere. and Recent labs in EPIC reviewed by me today.     Assessment/Plan:  (C61,  C79.51) Prostate cancer metastatic to bone (H)  (primary encounter diagnosis)  (W19.XXXD) Fall, subsequent encounter  (R07.81) Rib pain   Comment: Chronic prostate cancer with widespread metastasis to the spine and skull, specifically left temporal bone. Known metastasis to ribs, may be contributing to pain vs soft tissue injury. CXR negative for acute cardiopulmonary process, or osseous abnormality. Personally reviewed CXR today. Recent disease progression while on " Zytiga/prednisone.  Progressive deconditioning and physical decline resulting in several falls outpatient, CT of the head without acute findings.    -pain improved since scheduling oxycodone, reviewed pain levels today, almost always ranked <9, more frequently in mid range 3-6 since scheduling oxycodone and with frequent ratings of 0/10. Family is requesting his dose be increased to 10 mg Q4H, sounds like pain concerns predominantly driven by family and otherwise well controlled per patient/nursing. Concerned a drastic increase in oxy will cause more confusion and falls, hospitalized with the same after increasing his dose at home, especially given functional improvement. No changes today. Nursing called at 11:30 with increased pain, due for scheduled dose, requested they call me back if pain is still elevated on re-assessment. Counseled patient to avoid bending/lifting/twisting, especially without staff supervision  -Reports of nausea, fatigue since chemotherapy 10/18  Plan: Continue tylenol 1000 mg TID - limit 3 G/day. Add lidocaine 4% topically to left rib cage. Continue oxycodone 5 mg Q4H. Naloxone available per nursing. PT/OT, follow-up with oncology per recommendations.      (E11.69) Type 2 diabetes mellitus with other specified complication, without long-term current use of insulin (H)  Comment: Chronic, A1c 8.4.  Started on NPH with PTA metformin.  There was consideration of SGLT2 or GLP-1 but given pending chemotherapy opted for insulin which can be more easily held in the setting of poor oral intakes.  Prednisone decreased 10>5 mg daily>now 5 mg every other day.  -recent -250s  Plan: Monitor blood glucose 4 times daily, metformin 1000 mg daily, NPH 5 units daily with aspart sliding scale.      (I10) Essential hypertension  Comment: Chronic, mildly hypertensive on hospital admission, readings in TCU normal low.  Amlodipine discontinued.       BP Readings from Last 3 Encounters:   10/05/23 110/69    10/02/23 105/68   09/25/23 122/65   Plan: monitor BP off of antihypertensives with adjustments as needed.       (E78.5) Dyslipidemia  Comment: Chronic  Plan: Continue rosuvastatin     (F41.9) Anxiety  (F32.A) Depression, unspecified depression type  Comment: Chronic, mood okay.   Plan: Continue sertraline 100 mg daily, Cymbalta 20 mg twice daily, melatonin for sleep, ativan PRN for anxiety, monitor mood, behaviors, sleep habits     (R41.89) Cognitive impairment  Comment: Slums 9/30 on 9/8.  Plan: SNF for assist with ADLs, medication management, meals, activities.  OT for formal discharge recommendations     (D64.9) Anemia, unspecified type  Comment: Chronic, mild. Hgb stable        Hemoglobin   Date Value Ref Range Status   09/25/2023 11.3 (L) 13.3 - 17.7 g/dL Final   09/18/2023 11.3 (L) 13.3 - 17.7 g/dL Final   06/21/2017 12.8 (L) 13.3 - 17.7 g/dL Final   04/04/2010 15.1 13.3 - 17.7 g/dL Final   Plan: monitor hemoglobin periodically     (K59.01) Slow transit constipation  Comment: Chronic, opiate use and immobility contributing. Improved on scheduled senna-s  Plan: Continue senna S1 tab daily, continue twice daily as needed     (R53.81) Physical deconditioning  Comment: ongoing. Has reached a plateau with therapy, will discharge to AL pending placement  Plan: SNF for assist with ADLs, medication management, meals, activities    Electronically signed by: SANDRA Harry CNP         Sincerely,        SANDRA Harry CNP

## 2023-10-09 NOTE — PROGRESS NOTES
[facility] GERIATRICS DISCHARGE SUMMARY  PATIENT'S NAME: Migue Beach  YOB: 1945  MEDICAL RECORD NUMBER:  [id number]  Place of Service where encounter took place:  [facility]  ([facility]) [[id number]]    PRIMARY CARE PROVIDER AND CLINIC RESPONSIBLE AFTER TRANSFER:   Kalpesh Dey MD, [address] / [place] [address]    Assisted Living:        Transferring providers: SANDRA Harry CNP, Laz Lewis MD  Recent Hospitalization/ED:  [facility] stay 9/3/23 to 9/7/23.  Date of SNF Admission: September 07, 2023  Date of SNF (anticipated) Discharge: October 17, 2023  Discharged to: new assisted living for patient  Cognitive Scores: SLUMS: 09/30  Physical Function:  independent with walker  DME: No new DME needed    CODE STATUS/ADVANCE DIRECTIVES DISCUSSION:  Full Code   ALLERGIES: Patient has no known allergies.    NURSING FACILITY COURSE   By chart review, patient presented to the ED with confusion.  Family reported increased fatigue and weakness over the last month culminating in an several falls 8/30.  He has a history of prostate cancer with metastasis on opiates for chronic back pain.  In ED, work-up remarkable for glucose 216, creatinine 0.65, white 0.2, hemoglobin 12.  UA was unremarkable.  CT demonstrated presumed chronic microvascular changes, no acute intracranial process.  MRI of the brain with numerous osseous metastasis and patchy meningeal enhancement underlying the left parietal calvarial lesion, reactive versus meningeal spread of disease.  Chest x-ray without acute cardiopulmonary findings.  Thoracic and lumbar MRI demonstrated widespread osseous metastasis, 5 mm intradural extramedullary lesion at T9-10 and left supraclavicular and retroperitoneal metastasis.  He was seen by oncology and therapy team, recommended TCU to improve strength before starting an alternative regimen.  Prednisone was decreased to 5 mg daily.  A1c  was elevated at 8.4 and he was started on NPH. He was admitted to the above facility for ongoing rehab, medical management.        In TCU, oxycodone scheduled 5 mg QID, tylenol 100 mg TID and lidocaine patch with improvement. Unable to retain teaching/education on diabetes management due to cognition. Underwent chemotherapy 9/28/23 per [place] [facility] with some ongoing nausea and fatigue. Prednisone was resumed. Seen today for a visit in his room. Per patient/nursing, oncology ordered oxycodone 7.5 mg Q4H PRN, unclear whether this was intended in addition to scheduled dose. Nursing did not approve orders over the weekend out of concern that this dose was excessive in addition to 5 mg Q4H as currently scheduled. Patient would like 7.5 mg but unclear on what frequency and is not sure what he is taking for pain currently. Pain is in his back which is chronic and now extending into left rib cage. He believes his bed and too much independent movement is also contributing to pain. He is denying CP, SOB, changes in b/b habits, fever/chills.    Summary of nursing facility stay:   (C61,  C79.51) Prostate cancer metastatic to bone (H)  (primary encounter diagnosis)  (W19.XXXD) Fall, subsequent encounter  (R07.81) Rib pain   Comment: Chronic prostate cancer with widespread metastasis to the spine and skull, specifically left temporal bone. Known metastasis to ribs, may be contributing to pain vs soft tissue injury. CXR negative for acute cardiopulmonary process, or osseous abnormality. Personally reviewed CXR today. Recent disease progression while on Zytiga/prednisone.  Progressive deconditioning and physical decline resulting in several falls outpatient, CT of the head without acute findings.    -pain worsening again despite interventions, patient suspicious of mattress but suspect mostly related to bony mets. Will increase oxy to 7.5 mg Q4H scheduled.   -Reports of nausea, fatigue since chemotherapy 9/28. Resumed prednisone per  oncology  Plan: Continue tylenol 1000 mg TID - limit 3 G/day. Lidocaine 4% topically to left rib cage. Oxycodone 7.5 mg Q4H scheduled.Non pharmacological interventions, avoid bending, twisting, lifting. Naloxone available per nursing. PT/OT, follow-up with oncology per recommendations.      (E11.69) Type 2 diabetes mellitus with other specified complication, without long-term current use of insulin (H)  Comment: Chronic, A1c 8.4.  Started on NPH with PTA metformin.  There was consideration of SGLT2 or GLP-1 but given pending chemotherapy opted for insulin which can be more easily held in the setting of poor oral intakes.  Prednisone initially weaned, now resumed 5 mg BID per oncology.  -recent BG mainly 100s with occasional elevations up to 250  Plan: Monitor blood glucose 4 times daily, metformin 1000 mg daily, NPH 5 units daily with aspart sliding scale.      (I10) Essential hypertension  Comment: Chronic, mildly hypertensive on hospital admission, readings in TCU normal low.  Amlodipine discontinued.   BP Readings from Last 3 Encounters:   10/09/23 118/74   10/05/23 110/69   10/02/23 105/68   Plan: monitor BP off of antihypertensives with adjustments as needed.       (E78.5) Dyslipidemia  Comment: Chronic  Plan: Continue rosuvastatin     (F41.9) Anxiety  (F32.A) Depression, unspecified depression type  Comment: Chronic, mood okay.   Plan: Continue sertraline 100 mg daily, Cymbalta 20 mg twice daily, melatonin for sleep, ativan PRN for anxiety, monitor mood, behaviors, sleep habits     (R41.89) Cognitive impairment  Comment: Slums 9/30 on 9/8. Very limited insight and STML.  Plan: SNF for assist with ADLs, medication management, meals, activities.  OT for formal discharge recommendations     (D64.9) Anemia, unspecified type  Comment: Chronic, mild. Hgb stable  Hemoglobin   Date Value Ref Range Status   09/25/2023 11.3 (L) 13.3 - 17.7 g/dL Final   09/18/2023 11.3 (L) 13.3 - 17.7 g/dL Final   06/21/2017 12.8 (L)  13.3 - 17.7 g/dL Final   04/04/2010 15.1 13.3 - 17.7 g/dL Final   Plan: monitor hemoglobin periodically     (K59.01) Slow transit constipation  Comment: Chronic, opiate use and immobility contributing. Improved on scheduled senna-s  Plan: Continue senna S1 tab daily, continue twice daily as needed     (R53.81) Physical deconditioning  Comment: ongoing. Has reached a plateau with therapy, will discharge to AL pending placement  Plan: SNF for assist with ADLs, medication management, meals, activities    Discharge Medications:  MED REC REQUIRED  Post Medication Reconciliation Status:  Medication reconciliation previously completed during another office visit       Current Outpatient Medications   Medication Sig Dispense Refill    oxyCODONE (ROXICODONE) 5 MG tablet Take 1.5 tablets (7.5 mg) by mouth every 4 hours 60 tablet 0    acetaminophen (TYLENOL) 500 MG tablet Take 1,000 mg by mouth 3 times daily      diphenhydrAMINE-zinc acetate (BENADRYL) 1-0.1 % external cream Apply topically 3 times daily as needed for itching      DULoxetine (CYMBALTA) 20 MG capsule Take 20 mg by mouth 2 times daily      glucose 40 % (400 mg/mL) gel Take 15-30 g by mouth every 15 minutes as needed for low blood sugar (for blood sugar <60 mg/dl)      insulin aspart (NOVOLOG PEN) 100 UNIT/ML pen Inject 1-7 Units Subcutaneous 3 times daily (before meals) Correction Scale - MEDIUM INSULIN RESISTANCE DOSING     Do Not give Correction Insulin if Pre-Meal BG less than 140.   For Pre-Meal  - 189 give 1 unit.   For Pre-Meal  - 239 give 2 units.   For Pre-Meal  - 289 give 3 units.   For Pre-Meal  - 339 give 4 units.   For Pre-Meal - 399 give 5 units.   For Pre-Meal -449 give 6 units  For Pre-Meal BG greater than or equal to 450 give 7 units.   To be given with prandial insulin, and based on pre-meal blood glucose.    Notify provider if glucose greater than or equal to 350 mg/dL after administration of correction  dose.  If given at mealtime, administer within 30 minutes of start of meal. 15 mL     insulin aspart (NOVOLOG PEN) 100 UNIT/ML pen Inject 1-5 Units Subcutaneous At Bedtime MEDIUM INSULIN RESISTANCE DOSING    Do Not give Bedtime Correction Insulin if BG less than  200.   For  - 249 give 1 units.   For  - 299 give 2 units.   For  - 349 give 3 units.   For  -399 give 4 units.   For BG greater than or equal to 400 give 5 units.  Notify provider if glucose greater than or equal to 350 mg/dL after administration of correction dose.  If given at mealtime, administer within 30 minutes of start of meal. 15 mL     insulin  UNIT/ML vial Inject 6 Units Subcutaneous daily Hold for BG<100      Lidocaine (LIDOCARE) 4 % Patch Place 1 patch onto the skin every 24 hours To prevent lidocaine toxicity, patient should be patch free for 12 hrs daily.      LORazepam (ATIVAN) 1 MG tablet Take 0.5 tablets (0.5 mg) by mouth every 6 hours as needed for anxiety, nausea or pain 20 tablet 0    magnesium oxide (MAG-OX) 400 MG tablet Take 1 tablet (400 mg) by mouth daily      melatonin 1 MG TABS tablet Take 3 tablets (3 mg) by mouth nightly as needed for sleep      metFORMIN (GLUCOPHAGE XR) 500 MG 24 hr tablet Take 1,000 mg by mouth daily (with dinner)      ondansetron (ZOFRAN ODT) 4 MG ODT tab Take 1 tablet (4 mg) by mouth every 6 hours as needed for nausea or vomiting      predniSONE (DELTASONE) 5 MG tablet Take 1 tablet a day for the next 7 days then 1 tablet every other day for 7 days then off.      rosuvastatin (CRESTOR) 20 MG tablet Take 20 mg by mouth daily      senna-docusate (SENOKOT-S/PERICOLACE) 8.6-50 MG tablet Take 1 tablet by mouth daily      senna-docusate (SENOKOT-S/PERICOLACE) 8.6-50 MG tablet Take 1 tablet by mouth 2 times daily as needed for constipation      sertraline (ZOLOFT) 100 MG tablet Take 100 mg by mouth daily          Past Medical History:   Past Medical History:   Diagnosis Date     "Depressive disorder     anxiety    Hypertension      Physical Exam:   Vitals: /74   Pulse 89   Temp 97.6  F (36.4  C)   Resp 18   Ht 1.702 m (5' 7\")   Wt 78.5 kg (173 lb)   SpO2 94%   BMI 27.10 kg/m    BMI: Body mass index is 27.1 kg/m .  GENERAL APPEARANCE:  Alert, in no distress  RESP:  respiratory effort and palpation of chest normal, lungs clear to auscultation , no respiratory distress  CV:  Palpation and auscultation of heart done , regular rate and rhythm, no murmur, rub, or gallop, no edema  ABDOMEN:  normal bowel sounds, soft, nontender, no hepatosplenomegaly or other masses  M/S:   Gait and station abnormal ambulatory with walker  SKIN:  Inspection of skin and subcutaneous tissue baseline, Palpation of skin and subcutaneous tissue baseline  NEURO:   robins spontaneously  PSYCH:  insight and judgement impaired, memory impaired      SNF labs: Labs done in SNF are in [facility]. Please refer to them using [employer]/Care Everywhere. and Recent labs in [facility] reviewed by me today.     DISCHARGE PLAN:  Follow up labs: No labs orders/due  Medical Follow Up:      Follow up with primary care provider in 1-2 weeks  Appointment: lab, office visit, chemo infusion. Thursday, October 19 at 10:45 [place] [facility] [address] [place] [phone] appointment ends at 2. Transportation to be arranged  Discharge Services: Home Care:  Occupational Therapy, Physical Therapy, Registered Nurse, Home Health Aide, and   Discharge Instructions Verbalized to Patient at Discharge:   Monitor blood glucose monitoring 4 times a day. Keep a record and bring it to your next primary provider visit.     TOTAL DISCHARGE TIME:   Greater than 30 minutes  Electronically signed by:  SANDRA Harry [facility]     Home care Face to Face documentation done in [facility] attached to Home care orders for [facility].             "

## 2023-10-09 NOTE — LETTER
10/9/2023        RE: Migue Beach  [address]  [place] [address]        [facility] GERIATRICS DISCHARGE SUMMARY  PATIENT'S NAME: Migue Beach  YOB: 1945  MEDICAL RECORD NUMBER:  [id number]  Place of Service where encounter took place:  [facility]  ([facility]) [[id number]]    PRIMARY CARE PROVIDER AND CLINIC RESPONSIBLE AFTER TRANSFER:   Kalpesh Dey MD, [address] / [place] [address]    Assisted Living:        Transferring providers: SANDRA Harry CNP, Laz Lewis MD  Recent Hospitalization/ED:  [facility] stay 9/3/23 to 9/7/23.  Date of SNF Admission: September 07, 2023  Date of SNF (anticipated) Discharge: October 16, 2023  Discharged to: new assisted living for patient  Cognitive Scores: SLUMS: 09/30  Physical Function:  independent with walker  DME: No new DME needed    CODE STATUS/ADVANCE DIRECTIVES DISCUSSION:  Full Code   ALLERGIES: Patient has no known allergies.    NURSING FACILITY COURSE   By chart review, patient presented to the ED with confusion.  Family reported increased fatigue and weakness over the last month culminating in an several falls 8/30.  He has a history of prostate cancer with metastasis on opiates for chronic back pain.  In ED, work-up remarkable for glucose 216, creatinine 0.65, white 0.2, hemoglobin 12.  UA was unremarkable.  CT demonstrated presumed chronic microvascular changes, no acute intracranial process.  MRI of the brain with numerous osseous metastasis and patchy meningeal enhancement underlying the left parietal calvarial lesion, reactive versus meningeal spread of disease.  Chest x-ray without acute cardiopulmonary findings.  Thoracic and lumbar MRI demonstrated widespread osseous metastasis, 5 mm intradural extramedullary lesion at T9-10 and left supraclavicular and retroperitoneal metastasis.  He was seen by oncology and therapy team, recommended TCU to improve  strength before starting an alternative regimen.  Prednisone was decreased to 5 mg daily.  A1c was elevated at 8.4 and he was started on NPH. He was admitted to the above facility for ongoing rehab, medical management.        In TCU, oxycodone scheduled 5 mg QID, tylenol 100 mg TID and lidocaine patch with improvement. Unable to retain teaching/education on diabetes management due to cognition. Underwent chemotherapy 9/28/23 per [place] [facility] with some ongoing nausea and fatigue. Prednisone was resumed. Seen today for a visit in his room. Per patient/nursing, oncology ordered oxycodone 7.5 mg Q4H PRN, unclear whether this was intended in addition to scheduled dose. Nursing did not approve orders over the weekend out of concern that this dose was excessive in addition to 5 mg Q4H as currently scheduled. Patient would like 7.5 mg but unclear on what frequency and is not sure what he is taking for pain currently. Pain is in his back which is chronic and now extending into left rib cage. He believes his bed and too much independent movement is also contributing to pain. He is denying CP, SOB, changes in b/b habits, fever/chills.    Summary of nursing facility stay:   (C61,  C79.51) Prostate cancer metastatic to bone (H)  (primary encounter diagnosis)  (W19.XXXD) Fall, subsequent encounter  (R07.81) Rib pain   Comment: Chronic prostate cancer with widespread metastasis to the spine and skull, specifically left temporal bone. Known metastasis to ribs, may be contributing to pain vs soft tissue injury. CXR negative for acute cardiopulmonary process, or osseous abnormality. Personally reviewed CXR today. Recent disease progression while on Zytiga/prednisone.  Progressive deconditioning and physical decline resulting in several falls outpatient, CT of the head without acute findings.    -pain worsening again despite interventions, patient suspicious of mattress but suspect mostly related to bony mets. Will increase oxy to 7.5  mg Q4H scheduled.   -Reports of nausea, fatigue since chemotherapy 9/28. Resumed prednisone per oncology  Plan: Continue tylenol 1000 mg TID - limit 3 G/day. Lidocaine 4% topically to left rib cage. Oxycodone 7.5 mg Q4H scheduled.Non pharmacological interventions, avoid bending, twisting, lifting. Naloxone available per nursing. PT/OT, follow-up with oncology per recommendations.      (E11.69) Type 2 diabetes mellitus with other specified complication, without long-term current use of insulin (H)  Comment: Chronic, A1c 8.4.  Started on NPH with PTA metformin.  There was consideration of SGLT2 or GLP-1 but given pending chemotherapy opted for insulin which can be more easily held in the setting of poor oral intakes.  Prednisone initially weaned, now resumed 5 mg BID per oncology.  -recent BG mainly 100s with occasional elevations up to 250  Plan: Monitor blood glucose 4 times daily, metformin 1000 mg daily, NPH 5 units daily with aspart sliding scale.      (I10) Essential hypertension  Comment: Chronic, mildly hypertensive on hospital admission, readings in TCU normal low.  Amlodipine discontinued.   BP Readings from Last 3 Encounters:   10/09/23 118/74   10/05/23 110/69   10/02/23 105/68   Plan: monitor BP off of antihypertensives with adjustments as needed.       (E78.5) Dyslipidemia  Comment: Chronic  Plan: Continue rosuvastatin     (F41.9) Anxiety  (F32.A) Depression, unspecified depression type  Comment: Chronic, mood okay.   Plan: Continue sertraline 100 mg daily, Cymbalta 20 mg twice daily, melatonin for sleep, ativan PRN for anxiety, monitor mood, behaviors, sleep habits     (R41.89) Cognitive impairment  Comment: Slums 9/30 on 9/8. Very limited insight and STML.  Plan: SNF for assist with ADLs, medication management, meals, activities.  OT for formal discharge recommendations     (D64.9) Anemia, unspecified type  Comment: Chronic, mild. Hgb stable  Hemoglobin   Date Value Ref Range Status   09/25/2023 11.3  (L) 13.3 - 17.7 g/dL Final   09/18/2023 11.3 (L) 13.3 - 17.7 g/dL Final   06/21/2017 12.8 (L) 13.3 - 17.7 g/dL Final   04/04/2010 15.1 13.3 - 17.7 g/dL Final   Plan: monitor hemoglobin periodically     (K59.01) Slow transit constipation  Comment: Chronic, opiate use and immobility contributing. Improved on scheduled senna-s  Plan: Continue senna S1 tab daily, continue twice daily as needed     (R53.81) Physical deconditioning  Comment: ongoing. Has reached a plateau with therapy, will discharge to [facility] pending placement  Plan: SNF for assist with ADLs, medication management, meals, activities    Discharge Medications:  MED REC REQUIRED  Post Medication Reconciliation Status:  Medication reconciliation previously completed during another office visit       Current Outpatient Medications   Medication Sig Dispense Refill     oxyCODONE (ROXICODONE) 5 MG tablet Take 1.5 tablets (7.5 mg) by mouth every 4 hours 60 tablet 0     acetaminophen (TYLENOL) 500 MG tablet Take 1,000 mg by mouth 3 times daily       diphenhydrAMINE-zinc acetate (BENADRYL) 1-0.1 % external cream Apply topically 3 times daily as needed for itching       DULoxetine (CYMBALTA) 20 MG capsule Take 20 mg by mouth 2 times daily       glucose 40 % (400 mg/mL) gel Take 15-30 g by mouth every 15 minutes as needed for low blood sugar (for blood sugar <60 mg/dl)       insulin aspart (NOVOLOG PEN) 100 UNIT/ML pen Inject 1-7 Units Subcutaneous 3 times daily (before meals) Correction Scale - MEDIUM INSULIN RESISTANCE DOSING     Do Not give Correction Insulin if Pre-Meal BG less than 140.   For Pre-Meal  - 189 give 1 unit.   For Pre-Meal  - 239 give 2 units.   For Pre-Meal  - 289 give 3 units.   For Pre-Meal  - 339 give 4 units.   For Pre-Meal - 399 give 5 units.   For Pre-Meal -449 give 6 units  For Pre-Meal BG greater than or equal to 450 give 7 units.   To be given with prandial insulin, and based on pre-meal blood glucose.     Notify provider if glucose greater than or equal to 350 mg/dL after administration of correction dose.  If given at mealtime, administer within 30 minutes of start of meal. 15 mL      insulin aspart (NOVOLOG PEN) 100 UNIT/ML pen Inject 1-5 Units Subcutaneous At Bedtime MEDIUM INSULIN RESISTANCE DOSING    Do Not give Bedtime Correction Insulin if BG less than  200.   For  - 249 give 1 units.   For  - 299 give 2 units.   For  - 349 give 3 units.   For  -399 give 4 units.   For BG greater than or equal to 400 give 5 units.  Notify provider if glucose greater than or equal to 350 mg/dL after administration of correction dose.  If given at mealtime, administer within 30 minutes of start of meal. 15 mL      insulin  UNIT/ML vial Inject 6 Units Subcutaneous daily Hold for BG<100       Lidocaine (LIDOCARE) 4 % Patch Place 1 patch onto the skin every 24 hours To prevent lidocaine toxicity, patient should be patch free for 12 hrs daily.       LORazepam (ATIVAN) 1 MG tablet Take 0.5 tablets (0.5 mg) by mouth every 6 hours as needed for anxiety, nausea or pain 20 tablet 0     magnesium oxide (MAG-OX) 400 MG tablet Take 1 tablet (400 mg) by mouth daily       melatonin 1 MG TABS tablet Take 3 tablets (3 mg) by mouth nightly as needed for sleep       metFORMIN (GLUCOPHAGE XR) 500 MG 24 hr tablet Take 1,000 mg by mouth daily (with dinner)       ondansetron (ZOFRAN ODT) 4 MG ODT tab Take 1 tablet (4 mg) by mouth every 6 hours as needed for nausea or vomiting       predniSONE (DELTASONE) 5 MG tablet Take 1 tablet a day for the next 7 days then 1 tablet every other day for 7 days then off.       rosuvastatin (CRESTOR) 20 MG tablet Take 20 mg by mouth daily       senna-docusate (SENOKOT-S/PERICOLACE) 8.6-50 MG tablet Take 1 tablet by mouth daily       senna-docusate (SENOKOT-S/PERICOLACE) 8.6-50 MG tablet Take 1 tablet by mouth 2 times daily as needed for constipation       sertraline (ZOLOFT) 100 MG  "tablet Take 100 mg by mouth daily          Past Medical History:   Past Medical History:   Diagnosis Date     Depressive disorder     anxiety     Hypertension      Physical Exam:   Vitals: /74   Pulse 89   Temp 97.6  F (36.4  C)   Resp 18   Ht 1.702 m (5' 7\")   Wt 78.5 kg (173 lb)   SpO2 94%   BMI 27.10 kg/m    BMI: Body mass index is 27.1 kg/m .  GENERAL APPEARANCE:  Alert, in no distress  RESP:  respiratory effort and palpation of chest normal, lungs clear to auscultation , no respiratory distress  CV:  Palpation and auscultation of heart done , regular rate and rhythm, no murmur, rub, or gallop, no edema  ABDOMEN:  normal bowel sounds, soft, nontender, no hepatosplenomegaly or other masses  M/S:   Gait and station abnormal ambulatory with walker  SKIN:  Inspection of skin and subcutaneous tissue baseline, Palpation of skin and subcutaneous tissue baseline  NEURO:   robins spontaneously  PSYCH:  insight and judgement impaired, memory impaired      SNF labs: Labs done in SNF are in [facility]. Please refer to them using [employer] Everywhere. and Recent labs in [facility] reviewed by me today.     DISCHARGE PLAN:  Follow up labs: No labs orders/due  Medical Follow Up:      Follow up with primary care provider in 1-2 weeks  Appointment: lab, office visit, chemo infusion. Thursday, October 19 at 10:45 [place] [facility] [address] [place] [phone] appointment ends at 2. Transportation to be arranged  Discharge Services: Home Care:  Occupational Therapy, Physical Therapy, Registered Nurse, Home Health Aide, and   Discharge Instructions Verbalized to Patient at Discharge:   Monitor blood glucose monitoring 4 times a day. Keep a record and bring it to your next primary provider visit.     TOTAL DISCHARGE TIME:   Greater than 30 minutes  Electronically signed by:  SANDRA Harry [facility]     Home care Face to Face documentation done in [facility] attached to Home care orders for [facility] home[facility].               Sincerely,        SANDRA Harry CNP

## 2023-10-09 NOTE — PATIENT INSTRUCTIONS
Migue Beach  YOB: 1945                                 SSN: [id number]  [facility] MRN#:[id number]  Medical Center Admission Date: 9/4/23 Discharge Date: 10/17/2023   PHYSICIAN's DISCHARGE SUMMARY / ORDER SHEET  1. Discharge to: Assisted Living  2. Medications:      Current Outpatient Medications   Medication Sig Dispense Refill    acetaminophen (TYLENOL) 500 MG tablet Take 1,000 mg by mouth 3 times daily      diphenhydrAMINE-zinc acetate (BENADRYL) 1-0.1 % external cream Apply topically 3 times daily as needed for itching      DULoxetine (CYMBALTA) 20 MG capsule Take 20 mg by mouth 2 times daily      glucose 40 % (400 mg/mL) gel Take 15-30 g by mouth every 15 minutes as needed for low blood sugar (for blood sugar <60 mg/dl)      insulin aspart (NOVOLOG PEN) 100 UNIT/ML pen Inject 1-7 Units Subcutaneous 3 times daily (before meals) Correction Scale - MEDIUM INSULIN RESISTANCE DOSING     Do Not give Correction Insulin if Pre-Meal BG less than 140.   For Pre-Meal  - 189 give 1 unit.   For Pre-Meal  - 239 give 2 units.   For Pre-Meal  - 289 give 3 units.   For Pre-Meal  - 339 give 4 units.   For Pre-Meal - 399 give 5 units.   For Pre-Meal -449 give 6 units  For Pre-Meal BG greater than or equal to 450 give 7 units.   To be given with prandial insulin, and based on pre-meal blood glucose.    Notify provider if glucose greater than or equal to 350 mg/dL after administration of correction dose.  If given at mealtime, administer within 30 minutes of start of meal. 15 mL     insulin aspart (NOVOLOG PEN) 100 UNIT/ML pen Inject 1-5 Units Subcutaneous At Bedtime MEDIUM INSULIN RESISTANCE DOSING    Do Not give Bedtime Correction Insulin if BG less than  200.   For  - 249 give 1 units.   For  - 299 give 2 units.   For  - 349 give 3 units.   For  -399 give 4 units.   For BG greater than or equal to 400 give 5 units.  Notify provider if glucose greater than or  equal to 350 mg/dL after administration of correction dose.  If given at mealtime, administer within 30 minutes of start of meal. 15 mL     insulin  UNIT/ML vial Inject 6 Units Subcutaneous daily Hold for BG<100      Lidocaine (LIDOCARE) 4 % Patch Place 1 patch onto the skin every 24 hours To prevent lidocaine toxicity, patient should be patch free for 12 hrs daily.      LORazepam (ATIVAN) 1 MG tablet Take 0.5 tablets (0.5 mg) by mouth every 6 hours as needed for anxiety, nausea or pain 20 tablet 0    magnesium oxide (MAG-OX) 400 MG tablet Take 1 tablet (400 mg) by mouth daily      melatonin 1 MG TABS tablet Take 3 tablets (3 mg) by mouth nightly as needed for sleep      metFORMIN (GLUCOPHAGE XR) 500 MG 24 hr tablet Take 1,000 mg by mouth daily (with dinner)      ondansetron (ZOFRAN ODT) 4 MG ODT tab Take 1 tablet (4 mg) by mouth every 6 hours as needed for nausea or vomiting      oxyCODONE (ROXICODONE) 5 MG tablet Take 1.5 tablets (7.5 mg) by mouth every 4 hours 60 tablet 0    predniSONE (DELTASONE) 5 MG tablet Take 1 tablet a day for the next 7 days then 1 tablet every other day for 7 days then off.      rosuvastatin (CRESTOR) 20 MG tablet Take 20 mg by mouth daily      senna-docusate (SENOKOT-S/PERICOLACE) 8.6-50 MG tablet Take 1 tablet by mouth daily      senna-docusate (SENOKOT-S/PERICOLACE) 8.6-50 MG tablet Take 1 tablet by mouth 2 times daily as needed for constipation      sertraline (ZOLOFT) 100 MG tablet Take 100 mg by mouth daily        DISCHARGE PLAN:  Follow up labs: No labs orders/due  Medical Follow Up:                 Follow up with primary care provider in 1-2 weeks  Appointment: lab, office visit, chemo infusion. Thursday, October 19 at 10:45 [place] [facility] [address] [facility] [address] [place] [phone] appointment ends at 2. Transportation to be arranged  Discharge Services: Home Care:  Occupational Therapy, Physical Therapy, Registered Nurse, Home Health Aide, and   Discharge  Instructions Verbalized to Patient at Discharge:   Monitor blood glucose monitoring 4 times a day. Keep a record and bring it to your next primary provider visit.     Discharge patient to home with current medications and treatments  Discharge Home with Home care as above  - Nursing call in 30 days supply of needed meds to pharmacy of choice upon discharge  - please send home with original of these discharge instructions and copy for chart.       ______________________________  Electronically signed:  SANDRA Harry [facility]                   10/9/2023

## 2024-01-01 ENCOUNTER — PATIENT OUTREACH (OUTPATIENT)
Dept: CARE COORDINATION | Facility: CLINIC | Age: 79
End: 2024-01-01
Payer: COMMERCIAL

## 2024-01-01 ENCOUNTER — MEDICAL CORRESPONDENCE (OUTPATIENT)
Dept: SCHEDULING | Facility: CLINIC | Age: 79
End: 2024-01-01
Payer: COMMERCIAL

## 2024-01-01 ENCOUNTER — APPOINTMENT (OUTPATIENT)
Dept: MRI IMAGING | Facility: CLINIC | Age: 79
DRG: 557 | End: 2024-01-01
Attending: HOSPITALIST
Payer: COMMERCIAL

## 2024-01-01 ENCOUNTER — LAB REQUISITION (OUTPATIENT)
Dept: LAB | Facility: CLINIC | Age: 79
End: 2024-01-01
Payer: COMMERCIAL

## 2024-01-01 ENCOUNTER — APPOINTMENT (OUTPATIENT)
Dept: CT IMAGING | Facility: CLINIC | Age: 79
DRG: 557 | End: 2024-01-01
Attending: PHYSICIAN ASSISTANT
Payer: COMMERCIAL

## 2024-01-01 ENCOUNTER — HOSPITAL ENCOUNTER (OUTPATIENT)
Dept: NUCLEAR MEDICINE | Facility: CLINIC | Age: 79
Setting detail: NUCLEAR MEDICINE
Discharge: HOME | End: 2024-06-20
Attending: INTERNAL MEDICINE
Payer: COMMERCIAL

## 2024-01-01 ENCOUNTER — APPOINTMENT (OUTPATIENT)
Dept: PHYSICAL THERAPY | Facility: CLINIC | Age: 79
DRG: 557 | End: 2024-01-01
Payer: COMMERCIAL

## 2024-01-01 ENCOUNTER — APPOINTMENT (OUTPATIENT)
Dept: CT IMAGING | Facility: CLINIC | Age: 79
DRG: 557 | End: 2024-01-01
Attending: EMERGENCY MEDICINE
Payer: COMMERCIAL

## 2024-01-01 ENCOUNTER — HEALTH MAINTENANCE LETTER (OUTPATIENT)
Age: 79
End: 2024-01-01

## 2024-01-01 ENCOUNTER — HOSPITAL ENCOUNTER (OUTPATIENT)
Dept: PET IMAGING | Facility: CLINIC | Age: 79
Setting detail: NUCLEAR MEDICINE
Discharge: HOME | End: 2024-08-20
Attending: INTERNAL MEDICINE | Admitting: INTERNAL MEDICINE
Payer: COMMERCIAL

## 2024-01-01 ENCOUNTER — APPOINTMENT (OUTPATIENT)
Dept: PHYSICAL THERAPY | Facility: CLINIC | Age: 79
DRG: 557 | End: 2024-01-01
Attending: PHYSICIAN ASSISTANT
Payer: COMMERCIAL

## 2024-01-01 ENCOUNTER — HOSPITAL ENCOUNTER (OUTPATIENT)
Dept: CT IMAGING | Facility: CLINIC | Age: 79
Discharge: HOME | End: 2024-03-28
Attending: NURSE PRACTITIONER | Admitting: NURSE PRACTITIONER
Payer: COMMERCIAL

## 2024-01-01 ENCOUNTER — APPOINTMENT (OUTPATIENT)
Dept: CARDIOLOGY | Facility: CLINIC | Age: 79
DRG: 557 | End: 2024-01-01
Attending: PHYSICIAN ASSISTANT
Payer: COMMERCIAL

## 2024-01-01 ENCOUNTER — HOSPITAL ENCOUNTER (OUTPATIENT)
Facility: CLINIC | Age: 79
Discharge: HOME | End: 2024-06-20
Payer: COMMERCIAL

## 2024-01-01 ENCOUNTER — HOSPITAL ENCOUNTER (OUTPATIENT)
Dept: CT IMAGING | Facility: CLINIC | Age: 79
Discharge: HOME | End: 2024-06-20
Attending: INTERNAL MEDICINE
Payer: COMMERCIAL

## 2024-01-01 ENCOUNTER — HOSPITAL ENCOUNTER (INPATIENT)
Facility: CLINIC | Age: 79
LOS: 4 days | Discharge: HOME HEALTH | DRG: 557 | End: 2024-09-10
Attending: EMERGENCY MEDICINE | Admitting: HOSPITALIST
Payer: COMMERCIAL

## 2024-01-01 VITALS
TEMPERATURE: 98.5 F | RESPIRATION RATE: 18 BRPM | DIASTOLIC BLOOD PRESSURE: 71 MMHG | BODY MASS INDEX: 24.69 KG/M2 | OXYGEN SATURATION: 95 % | WEIGHT: 157.63 LBS | HEART RATE: 100 BPM | SYSTOLIC BLOOD PRESSURE: 122 MMHG

## 2024-01-01 DIAGNOSIS — I95.9 HYPOTENSION, UNSPECIFIED HYPOTENSION TYPE: ICD-10-CM

## 2024-01-01 DIAGNOSIS — R59.0 LOCALIZED ENLARGED LYMPH NODES: ICD-10-CM

## 2024-01-01 DIAGNOSIS — R53.1 WEAKNESS: ICD-10-CM

## 2024-01-01 DIAGNOSIS — R50.9 FEVER, UNSPECIFIED: ICD-10-CM

## 2024-01-01 DIAGNOSIS — C61 PRIMARY MALIGNANT NEOPLASM OF PROSTATE (H): ICD-10-CM

## 2024-01-01 DIAGNOSIS — R59.1 LYMPHADENOPATHY: ICD-10-CM

## 2024-01-01 DIAGNOSIS — C61 MALIGNANT NEOPLASM OF PROSTATE (H): ICD-10-CM

## 2024-01-01 DIAGNOSIS — C61 PROSTATE CANCER (H): ICD-10-CM

## 2024-01-01 DIAGNOSIS — R41.0 DISORIENTATION, UNSPECIFIED: ICD-10-CM

## 2024-01-01 DIAGNOSIS — I48.91 NEW ONSET ATRIAL FIBRILLATION (H): ICD-10-CM

## 2024-01-01 DIAGNOSIS — R59.0 CERVICAL ADENOPATHY: ICD-10-CM

## 2024-01-01 DIAGNOSIS — R53.1 GENERALIZED WEAKNESS: ICD-10-CM

## 2024-01-01 LAB
ALBUMIN SERPL BCG-MCNC: 2.9 G/DL (ref 3.5–5.2)
ALBUMIN SERPL BCG-MCNC: 3.2 G/DL (ref 3.5–5.2)
ALBUMIN SERPL BCG-MCNC: 4.2 G/DL (ref 3.5–5.2)
ALBUMIN UR-MCNC: 10 MG/DL
ALBUMIN UR-MCNC: NEGATIVE MG/DL
ALP SERPL-CCNC: 125 U/L (ref 40–150)
ALP SERPL-CCNC: 352 U/L (ref 40–150)
ALP SERPL-CCNC: 433 U/L (ref 40–150)
ALT SERPL W P-5'-P-CCNC: 10 U/L (ref 0–70)
ALT SERPL W P-5'-P-CCNC: 11 U/L (ref 0–70)
ALT SERPL W P-5'-P-CCNC: 19 U/L (ref 0–70)
ANION GAP SERPL CALCULATED.3IONS-SCNC: 11 MMOL/L (ref 7–15)
ANION GAP SERPL CALCULATED.3IONS-SCNC: 11 MMOL/L (ref 7–15)
ANION GAP SERPL CALCULATED.3IONS-SCNC: 12 MMOL/L (ref 7–15)
APPEARANCE UR: CLEAR
APPEARANCE UR: CLEAR
AST SERPL W P-5'-P-CCNC: 162 U/L (ref 0–45)
AST SERPL W P-5'-P-CCNC: 21 U/L (ref 0–45)
AST SERPL W P-5'-P-CCNC: 76 U/L (ref 0–45)
ATRIAL RATE - MUSE: 95 BPM
ATRIAL RATE - MUSE: 98 BPM
BACTERIA UR CULT: NORMAL
BASOPHILS # BLD AUTO: 0 10E3/UL (ref 0–0.2)
BASOPHILS # BLD AUTO: 0 10E3/UL (ref 0–0.2)
BASOPHILS NFR BLD AUTO: 1 %
BASOPHILS NFR BLD AUTO: 1 %
BILIRUB DIRECT SERPL-MCNC: <0.2 MG/DL (ref 0–0.3)
BILIRUB SERPL-MCNC: 0.2 MG/DL
BILIRUB SERPL-MCNC: 0.3 MG/DL
BILIRUB SERPL-MCNC: 0.3 MG/DL
BILIRUB UR QL STRIP: NEGATIVE
BILIRUB UR QL STRIP: NEGATIVE
BUN SERPL-MCNC: 11.3 MG/DL (ref 8–23)
BUN SERPL-MCNC: 13.3 MG/DL (ref 8–23)
BUN SERPL-MCNC: 7.3 MG/DL (ref 8–23)
CALCIUM SERPL-MCNC: 8.2 MG/DL (ref 8.8–10.4)
CALCIUM SERPL-MCNC: 8.4 MG/DL (ref 8.8–10.4)
CALCIUM SERPL-MCNC: 8.9 MG/DL (ref 8.8–10.2)
CHLORIDE SERPL-SCNC: 100 MMOL/L (ref 98–107)
CHLORIDE SERPL-SCNC: 101 MMOL/L (ref 98–107)
CHLORIDE SERPL-SCNC: 102 MMOL/L (ref 98–107)
CK SERPL-CCNC: 1198 U/L (ref 39–308)
CK SERPL-CCNC: 397 U/L (ref 39–308)
CK SERPL-CCNC: 741 U/L (ref 39–308)
CK SERPL-CCNC: 778 U/L (ref 39–308)
COLOR UR AUTO: ABNORMAL
COLOR UR AUTO: YELLOW
CREAT BLD-MCNC: 0.6 MG/DL (ref 0.7–1.3)
CREAT SERPL-MCNC: 0.41 MG/DL (ref 0.67–1.17)
CREAT SERPL-MCNC: 0.57 MG/DL (ref 0.67–1.17)
CREAT SERPL-MCNC: 0.62 MG/DL (ref 0.67–1.17)
DEPRECATED HCO3 PLAS-SCNC: 26 MMOL/L (ref 22–29)
DIASTOLIC BLOOD PRESSURE - MUSE: NORMAL MMHG
DIASTOLIC BLOOD PRESSURE - MUSE: NORMAL MMHG
EGFRCR SERPLBLD CKD-EPI 2021: >60 ML/MIN/1.73M2
EGFRCR SERPLBLD CKD-EPI 2021: >90 ML/MIN/1.73M2
EOSINOPHIL # BLD AUTO: 0 10E3/UL (ref 0–0.7)
EOSINOPHIL # BLD AUTO: 0 10E3/UL (ref 0–0.7)
EOSINOPHIL NFR BLD AUTO: 0 %
EOSINOPHIL NFR BLD AUTO: 1 %
ERYTHROCYTE [DISTWIDTH] IN BLOOD BY AUTOMATED COUNT: 14.8 % (ref 10–15)
ERYTHROCYTE [DISTWIDTH] IN BLOOD BY AUTOMATED COUNT: 15.3 % (ref 10–15)
ERYTHROCYTE [DISTWIDTH] IN BLOOD BY AUTOMATED COUNT: 15.4 % (ref 10–15)
ERYTHROCYTE [DISTWIDTH] IN BLOOD BY AUTOMATED COUNT: 15.7 % (ref 10–15)
ERYTHROCYTE [DISTWIDTH] IN BLOOD BY AUTOMATED COUNT: 15.7 % (ref 10–15)
ERYTHROCYTE [DISTWIDTH] IN BLOOD BY AUTOMATED COUNT: 15.8 % (ref 10–15)
GLUCOSE BLDC GLUCOMTR-MCNC: 102 MG/DL (ref 70–99)
GLUCOSE BLDC GLUCOMTR-MCNC: 145 MG/DL (ref 70–99)
GLUCOSE BLDC GLUCOMTR-MCNC: 145 MG/DL (ref 70–99)
GLUCOSE BLDC GLUCOMTR-MCNC: 149 MG/DL (ref 70–99)
GLUCOSE BLDC GLUCOMTR-MCNC: 153 MG/DL (ref 70–99)
GLUCOSE BLDC GLUCOMTR-MCNC: 163 MG/DL (ref 70–99)
GLUCOSE BLDC GLUCOMTR-MCNC: 163 MG/DL (ref 70–99)
GLUCOSE BLDC GLUCOMTR-MCNC: 165 MG/DL (ref 70–99)
GLUCOSE BLDC GLUCOMTR-MCNC: 168 MG/DL (ref 70–99)
GLUCOSE BLDC GLUCOMTR-MCNC: 176 MG/DL (ref 70–99)
GLUCOSE BLDC GLUCOMTR-MCNC: 180 MG/DL (ref 70–99)
GLUCOSE BLDC GLUCOMTR-MCNC: 181 MG/DL (ref 70–99)
GLUCOSE BLDC GLUCOMTR-MCNC: 184 MG/DL (ref 70–99)
GLUCOSE BLDC GLUCOMTR-MCNC: 189 MG/DL (ref 70–99)
GLUCOSE BLDC GLUCOMTR-MCNC: 190 MG/DL (ref 70–99)
GLUCOSE BLDC GLUCOMTR-MCNC: 191 MG/DL (ref 70–99)
GLUCOSE BLDC GLUCOMTR-MCNC: 193 MG/DL (ref 70–99)
GLUCOSE BLDC GLUCOMTR-MCNC: 201 MG/DL (ref 70–99)
GLUCOSE BLDC GLUCOMTR-MCNC: 213 MG/DL (ref 70–99)
GLUCOSE BLDC GLUCOMTR-MCNC: 213 MG/DL (ref 70–99)
GLUCOSE BLDC GLUCOMTR-MCNC: 218 MG/DL (ref 70–99)
GLUCOSE BLDC GLUCOMTR-MCNC: 236 MG/DL (ref 70–99)
GLUCOSE BLDC GLUCOMTR-MCNC: 266 MG/DL (ref 70–99)
GLUCOSE BLDC GLUCOMTR-MCNC: 291 MG/DL (ref 70–99)
GLUCOSE SERPL-MCNC: 132 MG/DL (ref 70–99)
GLUCOSE SERPL-MCNC: 157 MG/DL (ref 70–99)
GLUCOSE SERPL-MCNC: 187 MG/DL (ref 70–99)
GLUCOSE UR STRIP-MCNC: 150 MG/DL
GLUCOSE UR STRIP-MCNC: 150 MG/DL
HBA1C MFR BLD: 7.6 %
HCO3 SERPL-SCNC: 22 MMOL/L (ref 22–29)
HCO3 SERPL-SCNC: 23 MMOL/L (ref 22–29)
HCT VFR BLD AUTO: 25.7 % (ref 40–53)
HCT VFR BLD AUTO: 26.7 % (ref 40–53)
HCT VFR BLD AUTO: 27 % (ref 40–53)
HCT VFR BLD AUTO: 29.4 % (ref 40–53)
HCT VFR BLD AUTO: 29.7 % (ref 40–53)
HCT VFR BLD AUTO: 37.7 % (ref 40–53)
HGB BLD-MCNC: 12.4 G/DL (ref 13.3–17.7)
HGB BLD-MCNC: 7.9 G/DL (ref 13.3–17.7)
HGB BLD-MCNC: 8.3 G/DL (ref 13.3–17.7)
HGB BLD-MCNC: 8.5 G/DL (ref 13.3–17.7)
HGB BLD-MCNC: 9.3 G/DL (ref 13.3–17.7)
HGB BLD-MCNC: 9.4 G/DL (ref 13.3–17.7)
HGB UR QL STRIP: NEGATIVE
HGB UR QL STRIP: NEGATIVE
HOLD SPECIMEN: NORMAL
HYALINE CASTS: 4 /LPF
IMM GRANULOCYTES # BLD: 0 10E3/UL
IMM GRANULOCYTES # BLD: 0.2 10E3/UL
IMM GRANULOCYTES NFR BLD: 1 %
IMM GRANULOCYTES NFR BLD: 4 %
INTERPRETATION ECG - MUSE: NORMAL
INTERPRETATION ECG - MUSE: NORMAL
KETONES UR STRIP-MCNC: ABNORMAL MG/DL
KETONES UR STRIP-MCNC: NEGATIVE MG/DL
LEUKOCYTE ESTERASE UR QL STRIP: NEGATIVE
LEUKOCYTE ESTERASE UR QL STRIP: NEGATIVE
LVEF ECHO: NORMAL
LYMPHOCYTES # BLD AUTO: 0.7 10E3/UL (ref 0.8–5.3)
LYMPHOCYTES # BLD AUTO: 0.9 10E3/UL (ref 0.8–5.3)
LYMPHOCYTES NFR BLD AUTO: 12 %
LYMPHOCYTES NFR BLD AUTO: 29 %
MAGNESIUM SERPL-MCNC: 1.8 MG/DL (ref 1.7–2.3)
MCH RBC QN AUTO: 27.9 PG (ref 26.5–33)
MCH RBC QN AUTO: 27.9 PG (ref 26.5–33)
MCH RBC QN AUTO: 28.7 PG (ref 26.5–33)
MCH RBC QN AUTO: 28.8 PG (ref 26.5–33)
MCH RBC QN AUTO: 29.1 PG (ref 26.5–33)
MCH RBC QN AUTO: 31.3 PG (ref 26.5–33)
MCHC RBC AUTO-ENTMCNC: 30.7 G/DL (ref 31.5–36.5)
MCHC RBC AUTO-ENTMCNC: 31.1 G/DL (ref 31.5–36.5)
MCHC RBC AUTO-ENTMCNC: 31.5 G/DL (ref 31.5–36.5)
MCHC RBC AUTO-ENTMCNC: 31.6 G/DL (ref 31.5–36.5)
MCHC RBC AUTO-ENTMCNC: 31.6 G/DL (ref 31.5–36.5)
MCHC RBC AUTO-ENTMCNC: 32.9 G/DL (ref 31.5–36.5)
MCV RBC AUTO: 90 FL (ref 78–100)
MCV RBC AUTO: 91 FL (ref 78–100)
MCV RBC AUTO: 93 FL (ref 78–100)
MCV RBC AUTO: 95 FL (ref 78–100)
MONOCYTES # BLD AUTO: 0.3 10E3/UL (ref 0–1.3)
MONOCYTES # BLD AUTO: 0.4 10E3/UL (ref 0–1.3)
MONOCYTES NFR BLD AUTO: 7 %
MONOCYTES NFR BLD AUTO: 8 %
MUCOUS THREADS #/AREA URNS LPF: PRESENT /LPF
NEUTROPHILS # BLD AUTO: 2 10E3/UL (ref 1.6–8.3)
NEUTROPHILS # BLD AUTO: 4.6 10E3/UL (ref 1.6–8.3)
NEUTROPHILS NFR BLD AUTO: 60 %
NEUTROPHILS NFR BLD AUTO: 76 %
NITRATE UR QL: NEGATIVE
NITRATE UR QL: NEGATIVE
NRBC # BLD AUTO: 0 10E3/UL
NRBC # BLD AUTO: 0 10E3/UL
NRBC BLD AUTO-RTO: 0 /100
NRBC BLD AUTO-RTO: 0 /100
P AXIS - MUSE: 45 DEGREES
P AXIS - MUSE: 61 DEGREES
PH UR STRIP: 5.5 [PH] (ref 5–7)
PH UR STRIP: 6.5 [PH] (ref 5–7)
PHOSPHATE SERPL-MCNC: 2.8 MG/DL (ref 2.5–4.5)
PLATELET # BLD AUTO: 138 10E3/UL (ref 150–450)
PLATELET # BLD AUTO: 142 10E3/UL (ref 150–450)
PLATELET # BLD AUTO: 145 10E3/UL (ref 150–450)
PLATELET # BLD AUTO: 157 10E3/UL (ref 150–450)
PLATELET # BLD AUTO: 159 10E3/UL (ref 150–450)
PLATELET # BLD AUTO: 194 10E3/UL (ref 150–450)
POTASSIUM SERPL-SCNC: 3.7 MMOL/L (ref 3.4–5.3)
POTASSIUM SERPL-SCNC: 4.1 MMOL/L (ref 3.4–5.3)
POTASSIUM SERPL-SCNC: 4.2 MMOL/L (ref 3.4–5.3)
PR INTERVAL - MUSE: 144 MS
PR INTERVAL - MUSE: 144 MS
PROT SERPL-MCNC: 6.5 G/DL (ref 6.4–8.3)
PROT SERPL-MCNC: 6.7 G/DL (ref 6.4–8.3)
PROT SERPL-MCNC: 6.7 G/DL (ref 6.4–8.3)
PSA SERPL DL<=0.01 NG/ML-MCNC: 122 NG/ML
PSA SERPL DL<=0.01 NG/ML-MCNC: 122 NG/ML
PSA SERPL DL<=0.01 NG/ML-MCNC: 229 NG/ML
PSA SERPL DL<=0.01 NG/ML-MCNC: 56.6 NG/ML
PTH-INTACT SERPL-MCNC: 31 PG/ML (ref 15–65)
QRS DURATION - MUSE: 114 MS
QRS DURATION - MUSE: 96 MS
QT - MUSE: 368 MS
QT - MUSE: 382 MS
QTC - MUSE: 462 MS
QTC - MUSE: 487 MS
R AXIS - MUSE: 63 DEGREES
R AXIS - MUSE: 82 DEGREES
RBC # BLD AUTO: 2.83 10E6/UL (ref 4.4–5.9)
RBC # BLD AUTO: 2.92 10E6/UL (ref 4.4–5.9)
RBC # BLD AUTO: 2.98 10E6/UL (ref 4.4–5.9)
RBC # BLD AUTO: 3.23 10E6/UL (ref 4.4–5.9)
RBC # BLD AUTO: 3.28 10E6/UL (ref 4.4–5.9)
RBC # BLD AUTO: 3.96 10E6/UL (ref 4.4–5.9)
RBC URINE: 0 /HPF
RBC URINE: <1 /HPF
SODIUM SERPL-SCNC: 134 MMOL/L (ref 135–145)
SODIUM SERPL-SCNC: 134 MMOL/L (ref 135–145)
SODIUM SERPL-SCNC: 140 MMOL/L (ref 135–145)
SP GR UR STRIP: 1.02 (ref 1–1.03)
SP GR UR STRIP: 1.02 (ref 1–1.03)
SYSTOLIC BLOOD PRESSURE - MUSE: NORMAL MMHG
SYSTOLIC BLOOD PRESSURE - MUSE: NORMAL MMHG
T AXIS - MUSE: 46 DEGREES
T AXIS - MUSE: 68 DEGREES
TROPONIN T SERPL HS-MCNC: 17 NG/L
TSH SERPL DL<=0.005 MIU/L-ACNC: 0.65 UIU/ML (ref 0.3–4.2)
TSH SERPL DL<=0.005 MIU/L-ACNC: 1.49 UIU/ML (ref 0.3–4.2)
UROBILINOGEN UR STRIP-MCNC: NORMAL MG/DL
UROBILINOGEN UR STRIP-MCNC: NORMAL MG/DL
VENTRICULAR RATE- MUSE: 95 BPM
VENTRICULAR RATE- MUSE: 98 BPM
VIT D+METAB SERPL-MCNC: 20 NG/ML (ref 20–50)
WBC # BLD AUTO: 3.2 10E3/UL (ref 4–11)
WBC # BLD AUTO: 4.5 10E3/UL (ref 4–11)
WBC # BLD AUTO: 4.5 10E3/UL (ref 4–11)
WBC # BLD AUTO: 4.8 10E3/UL (ref 4–11)
WBC # BLD AUTO: 4.9 10E3/UL (ref 4–11)
WBC # BLD AUTO: 6.1 10E3/UL (ref 4–11)
WBC URINE: 0 /HPF
WBC URINE: 0 /HPF

## 2024-01-01 PROCEDURE — 250N000009 HC RX 250: Performed by: INTERNAL MEDICINE

## 2024-01-01 PROCEDURE — 85014 HEMATOCRIT: CPT | Performed by: HOSPITALIST

## 2024-01-01 PROCEDURE — 120N000001 HC R&B MED SURG/OB

## 2024-01-01 PROCEDURE — 250N000013 HC RX MED GY IP 250 OP 250 PS 637: Performed by: PHYSICIAN ASSISTANT

## 2024-01-01 PROCEDURE — 82248 BILIRUBIN DIRECT: CPT | Performed by: PHYSICIAN ASSISTANT

## 2024-01-01 PROCEDURE — 85004 AUTOMATED DIFF WBC COUNT: CPT | Performed by: EMERGENCY MEDICINE

## 2024-01-01 PROCEDURE — 36415 COLL VENOUS BLD VENIPUNCTURE: CPT | Performed by: HOSPITALIST

## 2024-01-01 PROCEDURE — 36415 COLL VENOUS BLD VENIPUNCTURE: CPT | Mod: ORL | Performed by: NURSE PRACTITIONER

## 2024-01-01 PROCEDURE — 99285 EMERGENCY DEPT VISIT HI MDM: CPT | Mod: 25

## 2024-01-01 PROCEDURE — 250N000012 HC RX MED GY IP 250 OP 636 PS 637: Performed by: PHYSICIAN ASSISTANT

## 2024-01-01 PROCEDURE — 71275 CT ANGIOGRAPHY CHEST: CPT

## 2024-01-01 PROCEDURE — A9503 TC99M MEDRONATE: HCPCS | Performed by: INTERNAL MEDICINE

## 2024-01-01 PROCEDURE — 258N000003 HC RX IP 258 OP 636: Performed by: HOSPITALIST

## 2024-01-01 PROCEDURE — 99233 SBSQ HOSP IP/OBS HIGH 50: CPT | Performed by: HOSPITALIST

## 2024-01-01 PROCEDURE — 83735 ASSAY OF MAGNESIUM: CPT | Performed by: PHYSICIAN ASSISTANT

## 2024-01-01 PROCEDURE — 255N000002 HC RX 255 OP 636: Performed by: HOSPITALIST

## 2024-01-01 PROCEDURE — 97530 THERAPEUTIC ACTIVITIES: CPT | Mod: GP

## 2024-01-01 PROCEDURE — 99222 1ST HOSP IP/OBS MODERATE 55: CPT | Performed by: PHYSICIAN ASSISTANT

## 2024-01-01 PROCEDURE — 71260 CT THORAX DX C+: CPT

## 2024-01-01 PROCEDURE — P9603 ONE-WAY ALLOW PRORATED MILES: HCPCS | Mod: ORL | Performed by: NURSE PRACTITIONER

## 2024-01-01 PROCEDURE — 258N000003 HC RX IP 258 OP 636: Performed by: PHYSICIAN ASSISTANT

## 2024-01-01 PROCEDURE — 250N000009 HC RX 250: Performed by: NURSE PRACTITIONER

## 2024-01-01 PROCEDURE — 99223 1ST HOSP IP/OBS HIGH 75: CPT | Performed by: PHYSICIAN ASSISTANT

## 2024-01-01 PROCEDURE — 82040 ASSAY OF SERUM ALBUMIN: CPT | Performed by: EMERGENCY MEDICINE

## 2024-01-01 PROCEDURE — 36415 COLL VENOUS BLD VENIPUNCTURE: CPT | Performed by: PHYSICIAN ASSISTANT

## 2024-01-01 PROCEDURE — 84484 ASSAY OF TROPONIN QUANT: CPT | Performed by: EMERGENCY MEDICINE

## 2024-01-01 PROCEDURE — 250N000013 HC RX MED GY IP 250 OP 250 PS 637: Performed by: HOSPITALIST

## 2024-01-01 PROCEDURE — G0378 HOSPITAL OBSERVATION PER HR: HCPCS

## 2024-01-01 PROCEDURE — 82962 GLUCOSE BLOOD TEST: CPT

## 2024-01-01 PROCEDURE — 96361 HYDRATE IV INFUSION ADD-ON: CPT

## 2024-01-01 PROCEDURE — 93005 ELECTROCARDIOGRAM TRACING: CPT

## 2024-01-01 PROCEDURE — 84153 ASSAY OF PSA TOTAL: CPT | Performed by: HOSPITALIST

## 2024-01-01 PROCEDURE — 96360 HYDRATION IV INFUSION INIT: CPT | Mod: 59

## 2024-01-01 PROCEDURE — A9585 GADOBUTROL INJECTION: HCPCS | Performed by: HOSPITALIST

## 2024-01-01 PROCEDURE — 255N000002 HC RX 255 OP 636: Performed by: PHYSICIAN ASSISTANT

## 2024-01-01 PROCEDURE — 250N000011 HC RX IP 250 OP 636: Performed by: INTERNAL MEDICINE

## 2024-01-01 PROCEDURE — 82565 ASSAY OF CREATININE: CPT

## 2024-01-01 PROCEDURE — 82550 ASSAY OF CK (CPK): CPT | Performed by: HOSPITALIST

## 2024-01-01 PROCEDURE — 250N000011 HC RX IP 250 OP 636: Performed by: NURSE PRACTITIONER

## 2024-01-01 PROCEDURE — 84443 ASSAY THYROID STIM HORMONE: CPT | Mod: ORL

## 2024-01-01 PROCEDURE — 82310 ASSAY OF CALCIUM: CPT | Performed by: PHYSICIAN ASSISTANT

## 2024-01-01 PROCEDURE — 74177 CT ABD & PELVIS W/CONTRAST: CPT

## 2024-01-01 PROCEDURE — 84100 ASSAY OF PHOSPHORUS: CPT | Performed by: PHYSICIAN ASSISTANT

## 2024-01-01 PROCEDURE — 999N000208 ECHOCARDIOGRAM COMPLETE

## 2024-01-01 PROCEDURE — 84443 ASSAY THYROID STIM HORMONE: CPT | Performed by: PHYSICIAN ASSISTANT

## 2024-01-01 PROCEDURE — 84153 ASSAY OF PSA TOTAL: CPT | Mod: ORL

## 2024-01-01 PROCEDURE — 85025 COMPLETE CBC W/AUTO DIFF WBC: CPT | Mod: ORL | Performed by: NURSE PRACTITIONER

## 2024-01-01 PROCEDURE — 36415 COLL VENOUS BLD VENIPUNCTURE: CPT | Performed by: EMERGENCY MEDICINE

## 2024-01-01 PROCEDURE — G0103 PSA SCREENING: HCPCS | Mod: ORL

## 2024-01-01 PROCEDURE — 78815 PET IMAGE W/CT SKULL-THIGH: CPT | Mod: 26 | Performed by: RADIOLOGY

## 2024-01-01 PROCEDURE — 250N000009 HC RX 250: Performed by: EMERGENCY MEDICINE

## 2024-01-01 PROCEDURE — 82550 ASSAY OF CK (CPK): CPT | Performed by: EMERGENCY MEDICINE

## 2024-01-01 PROCEDURE — 78815 PET IMAGE W/CT SKULL-THIGH: CPT | Mod: PS

## 2024-01-01 PROCEDURE — A9596 HC RX 343: HCPCS | Performed by: INTERNAL MEDICINE

## 2024-01-01 PROCEDURE — 82306 VITAMIN D 25 HYDROXY: CPT | Mod: ORL

## 2024-01-01 PROCEDURE — 343N000001 HC RX 343: Performed by: INTERNAL MEDICINE

## 2024-01-01 PROCEDURE — 81001 URINALYSIS AUTO W/SCOPE: CPT | Mod: ORL

## 2024-01-01 PROCEDURE — 258N000003 HC RX IP 258 OP 636: Performed by: EMERGENCY MEDICINE

## 2024-01-01 PROCEDURE — 250N000011 HC RX IP 250 OP 636: Performed by: EMERGENCY MEDICINE

## 2024-01-01 PROCEDURE — 70553 MRI BRAIN STEM W/O & W/DYE: CPT

## 2024-01-01 PROCEDURE — 78306 BONE IMAGING WHOLE BODY: CPT

## 2024-01-01 PROCEDURE — 99239 HOSP IP/OBS DSCHRG MGMT >30: CPT | Performed by: HOSPITALIST

## 2024-01-01 PROCEDURE — 81001 URINALYSIS AUTO W/SCOPE: CPT

## 2024-01-01 PROCEDURE — 80053 COMPREHEN METABOLIC PANEL: CPT | Mod: ORL | Performed by: NURSE PRACTITIONER

## 2024-01-01 PROCEDURE — 70450 CT HEAD/BRAIN W/O DYE: CPT

## 2024-01-01 PROCEDURE — 85027 COMPLETE CBC AUTOMATED: CPT | Performed by: PHYSICIAN ASSISTANT

## 2024-01-01 PROCEDURE — 36415 COLL VENOUS BLD VENIPUNCTURE: CPT | Mod: ORL

## 2024-01-01 PROCEDURE — C8929 TTE W OR WO FOL WCON,DOPPLER: HCPCS

## 2024-01-01 PROCEDURE — 83970 ASSAY OF PARATHORMONE: CPT | Mod: ORL

## 2024-01-01 PROCEDURE — 83036 HEMOGLOBIN GLYCOSYLATED A1C: CPT | Performed by: PHYSICIAN ASSISTANT

## 2024-01-01 PROCEDURE — 81003 URINALYSIS AUTO W/O SCOPE: CPT | Performed by: EMERGENCY MEDICINE

## 2024-01-01 PROCEDURE — 99232 SBSQ HOSP IP/OBS MODERATE 35: CPT | Performed by: HOSPITALIST

## 2024-01-01 PROCEDURE — 93010 ELECTROCARDIOGRAM REPORT: CPT | Performed by: INTERNAL MEDICINE

## 2024-01-01 PROCEDURE — 250N000013 HC RX MED GY IP 250 OP 250 PS 637: Performed by: EMERGENCY MEDICINE

## 2024-01-01 PROCEDURE — 82550 ASSAY OF CK (CPK): CPT | Performed by: PHYSICIAN ASSISTANT

## 2024-01-01 PROCEDURE — 87086 URINE CULTURE/COLONY COUNT: CPT

## 2024-01-01 PROCEDURE — 99207 PR APP CREDIT; MD BILLING SHARED VISIT: CPT | Performed by: HOSPITALIST

## 2024-01-01 PROCEDURE — 93306 TTE W/DOPPLER COMPLETE: CPT | Mod: 26 | Performed by: INTERNAL MEDICINE

## 2024-01-01 PROCEDURE — 97116 GAIT TRAINING THERAPY: CPT | Mod: GP

## 2024-01-01 PROCEDURE — 85027 COMPLETE CBC AUTOMATED: CPT | Performed by: HOSPITALIST

## 2024-01-01 PROCEDURE — 84153 ASSAY OF PSA TOTAL: CPT | Mod: ORL | Performed by: NURSE PRACTITIONER

## 2024-01-01 PROCEDURE — 97161 PT EVAL LOW COMPLEX 20 MIN: CPT | Mod: GP

## 2024-01-01 PROCEDURE — 87086 URINE CULTURE/COLONY COUNT: CPT | Mod: ORL

## 2024-01-01 RX ORDER — OXYCODONE HYDROCHLORIDE 10 MG/1
10 TABLET ORAL EVERY 4 HOURS PRN
COMMUNITY

## 2024-01-01 RX ORDER — LANOLIN ALCOHOL/MO/W.PET/CERES
3 CREAM (GRAM) TOPICAL AT BEDTIME
Status: DISCONTINUED | OUTPATIENT
Start: 2024-01-01 | End: 2024-01-01 | Stop reason: HOSPADM

## 2024-01-01 RX ORDER — HEPARIN SODIUM,PORCINE 10 UNIT/ML
5-10 VIAL (ML) INTRAVENOUS
Status: DISCONTINUED | OUTPATIENT
Start: 2024-01-01 | End: 2024-01-01 | Stop reason: HOSPADM

## 2024-01-01 RX ORDER — NALOXONE HYDROCHLORIDE 0.4 MG/ML
0.4 INJECTION, SOLUTION INTRAMUSCULAR; INTRAVENOUS; SUBCUTANEOUS
Status: DISCONTINUED | OUTPATIENT
Start: 2024-01-01 | End: 2024-01-01 | Stop reason: HOSPADM

## 2024-01-01 RX ORDER — SERTRALINE HYDROCHLORIDE 100 MG/1
100 TABLET, FILM COATED ORAL EVERY MORNING
Status: DISCONTINUED | OUTPATIENT
Start: 2024-01-01 | End: 2024-01-01 | Stop reason: HOSPADM

## 2024-01-01 RX ORDER — IOPAMIDOL 755 MG/ML
66 INJECTION, SOLUTION INTRAVASCULAR ONCE
Status: COMPLETED | OUTPATIENT
Start: 2024-01-01 | End: 2024-01-01

## 2024-01-01 RX ORDER — HYDROMORPHONE HCL IN WATER/PF 6 MG/30 ML
0.4 PATIENT CONTROLLED ANALGESIA SYRINGE INTRAVENOUS
Status: DISCONTINUED | OUTPATIENT
Start: 2024-01-01 | End: 2024-01-01 | Stop reason: HOSPADM

## 2024-01-01 RX ORDER — LORAZEPAM 0.5 MG/1
0.5 TABLET ORAL 2 TIMES DAILY PRN
COMMUNITY

## 2024-01-01 RX ORDER — BISACODYL 10 MG
10 SUPPOSITORY, RECTAL RECTAL DAILY PRN
Status: DISCONTINUED | OUTPATIENT
Start: 2024-01-01 | End: 2024-01-01 | Stop reason: HOSPADM

## 2024-01-01 RX ORDER — PROCHLORPERAZINE 25 MG
12.5 SUPPOSITORY, RECTAL RECTAL EVERY 12 HOURS PRN
Status: DISCONTINUED | OUTPATIENT
Start: 2024-01-01 | End: 2024-01-01 | Stop reason: HOSPADM

## 2024-01-01 RX ORDER — LORAZEPAM 0.5 MG/1
0.5 TABLET ORAL 2 TIMES DAILY
Status: ON HOLD | COMMUNITY
End: 2024-01-01

## 2024-01-01 RX ORDER — MORPHINE SULFATE 15 MG/1
15 TABLET, FILM COATED, EXTENDED RELEASE ORAL EVERY 12 HOURS
Qty: 15 TABLET | Refills: 0 | Status: SHIPPED | OUTPATIENT
Start: 2024-01-01 | End: 2024-01-01

## 2024-01-01 RX ORDER — NALOXONE HYDROCHLORIDE 0.4 MG/ML
0.2 INJECTION, SOLUTION INTRAMUSCULAR; INTRAVENOUS; SUBCUTANEOUS
Status: DISCONTINUED | OUTPATIENT
Start: 2024-01-01 | End: 2024-01-01 | Stop reason: HOSPADM

## 2024-01-01 RX ORDER — LORAZEPAM 0.5 MG/1
0.5 TABLET ORAL 2 TIMES DAILY PRN
Status: DISCONTINUED | OUTPATIENT
Start: 2024-01-01 | End: 2024-01-01 | Stop reason: HOSPADM

## 2024-01-01 RX ORDER — HYDROMORPHONE HCL IN WATER/PF 6 MG/30 ML
0.2 PATIENT CONTROLLED ANALGESIA SYRINGE INTRAVENOUS
Status: DISCONTINUED | OUTPATIENT
Start: 2024-01-01 | End: 2024-01-01 | Stop reason: HOSPADM

## 2024-01-01 RX ORDER — AMOXICILLIN 250 MG
1 CAPSULE ORAL 2 TIMES DAILY PRN
Status: DISCONTINUED | OUTPATIENT
Start: 2024-01-01 | End: 2024-01-01 | Stop reason: HOSPADM

## 2024-01-01 RX ORDER — LORAZEPAM 0.5 MG/1
0.5 TABLET ORAL 2 TIMES DAILY
Status: DISCONTINUED | OUTPATIENT
Start: 2024-01-01 | End: 2024-01-01 | Stop reason: HOSPADM

## 2024-01-01 RX ORDER — SODIUM CHLORIDE 9 MG/ML
INJECTION, SOLUTION INTRAVENOUS CONTINUOUS
Status: DISCONTINUED | OUTPATIENT
Start: 2024-01-01 | End: 2024-01-01

## 2024-01-01 RX ORDER — TC 99M MEDRONATE 20 MG/10ML
25 INJECTION, POWDER, LYOPHILIZED, FOR SOLUTION INTRAVENOUS ONCE
Status: COMPLETED | OUTPATIENT
Start: 2024-01-01 | End: 2024-01-01

## 2024-01-01 RX ORDER — GADOBUTROL 604.72 MG/ML
7 INJECTION INTRAVENOUS ONCE
Status: COMPLETED | OUTPATIENT
Start: 2024-01-01 | End: 2024-01-01

## 2024-01-01 RX ORDER — AMOXICILLIN 250 MG
2 CAPSULE ORAL DAILY
Status: DISCONTINUED | OUTPATIENT
Start: 2024-01-01 | End: 2024-01-01 | Stop reason: HOSPADM

## 2024-01-01 RX ORDER — MORPHINE SULFATE 15 MG/1
15 TABLET, FILM COATED, EXTENDED RELEASE ORAL DAILY PRN
COMMUNITY

## 2024-01-01 RX ORDER — MORPHINE SULFATE 15 MG/1
15 TABLET, FILM COATED, EXTENDED RELEASE ORAL EVERY 12 HOURS
Qty: 15 TABLET | Refills: 0 | Status: SHIPPED | OUTPATIENT
Start: 2024-01-01

## 2024-01-01 RX ORDER — ACETAMINOPHEN 325 MG/1
650 TABLET ORAL EVERY 4 HOURS PRN
Status: DISCONTINUED | OUTPATIENT
Start: 2024-01-01 | End: 2024-01-01 | Stop reason: HOSPADM

## 2024-01-01 RX ORDER — IOPAMIDOL 755 MG/ML
84 INJECTION, SOLUTION INTRAVASCULAR ONCE
Status: COMPLETED | OUTPATIENT
Start: 2024-01-01 | End: 2024-01-01

## 2024-01-01 RX ORDER — ACETAMINOPHEN 500 MG
1000 TABLET ORAL ONCE
Status: COMPLETED | OUTPATIENT
Start: 2024-01-01 | End: 2024-01-01

## 2024-01-01 RX ORDER — HEPARIN SODIUM (PORCINE) LOCK FLUSH IV SOLN 100 UNIT/ML 100 UNIT/ML
5-10 SOLUTION INTRAVENOUS
Status: DISCONTINUED | OUTPATIENT
Start: 2024-01-01 | End: 2024-01-01 | Stop reason: HOSPADM

## 2024-01-01 RX ORDER — NICOTINE POLACRILEX 4 MG
15-30 LOZENGE BUCCAL
Status: DISCONTINUED | OUTPATIENT
Start: 2024-01-01 | End: 2024-01-01 | Stop reason: HOSPADM

## 2024-01-01 RX ORDER — ACETAMINOPHEN 650 MG/1
650 SUPPOSITORY RECTAL EVERY 4 HOURS PRN
Status: DISCONTINUED | OUTPATIENT
Start: 2024-01-01 | End: 2024-01-01 | Stop reason: HOSPADM

## 2024-01-01 RX ORDER — PREDNISONE 5 MG/1
5 TABLET ORAL 2 TIMES DAILY
Status: DISCONTINUED | OUTPATIENT
Start: 2024-01-01 | End: 2024-01-01 | Stop reason: HOSPADM

## 2024-01-01 RX ORDER — MORPHINE SULFATE 15 MG/1
15 TABLET, FILM COATED, EXTENDED RELEASE ORAL EVERY 12 HOURS SCHEDULED
Status: DISCONTINUED | OUTPATIENT
Start: 2024-01-01 | End: 2024-01-01 | Stop reason: HOSPADM

## 2024-01-01 RX ORDER — LIDOCAINE 40 MG/G
CREAM TOPICAL
Status: DISCONTINUED | OUTPATIENT
Start: 2024-01-01 | End: 2024-01-01 | Stop reason: HOSPADM

## 2024-01-01 RX ORDER — DULOXETIN HYDROCHLORIDE 20 MG/1
20 CAPSULE, DELAYED RELEASE ORAL 2 TIMES DAILY
Status: DISCONTINUED | OUTPATIENT
Start: 2024-01-01 | End: 2024-01-01 | Stop reason: HOSPADM

## 2024-01-01 RX ORDER — PREDNISONE 5 MG/1
5 TABLET ORAL 2 TIMES DAILY
COMMUNITY

## 2024-01-01 RX ORDER — AMOXICILLIN 250 MG
2 CAPSULE ORAL 2 TIMES DAILY PRN
Status: DISCONTINUED | OUTPATIENT
Start: 2024-01-01 | End: 2024-01-01 | Stop reason: HOSPADM

## 2024-01-01 RX ORDER — MORPHINE SULFATE 15 MG/1
15 TABLET, FILM COATED, EXTENDED RELEASE ORAL DAILY PRN
Status: DISCONTINUED | OUTPATIENT
Start: 2024-01-01 | End: 2024-01-01 | Stop reason: HOSPADM

## 2024-01-01 RX ORDER — ACETAMINOPHEN 500 MG
1000 TABLET ORAL 3 TIMES DAILY
Status: DISCONTINUED | OUTPATIENT
Start: 2024-01-01 | End: 2024-01-01 | Stop reason: HOSPADM

## 2024-01-01 RX ORDER — MORPHINE SULFATE 15 MG/1
15 TABLET, FILM COATED, EXTENDED RELEASE ORAL 3 TIMES DAILY
Status: ON HOLD | COMMUNITY
End: 2024-01-01

## 2024-01-01 RX ORDER — HEPARIN SODIUM,PORCINE 10 UNIT/ML
5-10 VIAL (ML) INTRAVENOUS EVERY 24 HOURS
Status: DISCONTINUED | OUTPATIENT
Start: 2024-01-01 | End: 2024-01-01 | Stop reason: HOSPADM

## 2024-01-01 RX ORDER — DEXTROSE MONOHYDRATE 25 G/50ML
25-50 INJECTION, SOLUTION INTRAVENOUS
Status: DISCONTINUED | OUTPATIENT
Start: 2024-01-01 | End: 2024-01-01 | Stop reason: HOSPADM

## 2024-01-01 RX ORDER — POLYETHYLENE GLYCOL 3350 17 G/17G
17 POWDER, FOR SOLUTION ORAL 2 TIMES DAILY PRN
Status: DISCONTINUED | OUTPATIENT
Start: 2024-01-01 | End: 2024-01-01 | Stop reason: HOSPADM

## 2024-01-01 RX ORDER — MORPHINE SULFATE 15 MG/1
15 TABLET, FILM COATED, EXTENDED RELEASE ORAL 3 TIMES DAILY
Status: DISCONTINUED | OUTPATIENT
Start: 2024-01-01 | End: 2024-01-01

## 2024-01-01 RX ORDER — PROCHLORPERAZINE MALEATE 5 MG
5 TABLET ORAL EVERY 6 HOURS PRN
Status: DISCONTINUED | OUTPATIENT
Start: 2024-01-01 | End: 2024-01-01 | Stop reason: HOSPADM

## 2024-01-01 RX ORDER — OXYCODONE HYDROCHLORIDE 5 MG/1
10 TABLET ORAL EVERY 4 HOURS PRN
Status: DISCONTINUED | OUTPATIENT
Start: 2024-01-01 | End: 2024-01-01 | Stop reason: HOSPADM

## 2024-01-01 RX ORDER — POLYETHYLENE GLYCOL 3350 17 G/17G
17 POWDER, FOR SOLUTION ORAL DAILY
Status: DISCONTINUED | OUTPATIENT
Start: 2024-01-01 | End: 2024-01-01 | Stop reason: HOSPADM

## 2024-01-01 RX ADMIN — DULOXETINE HYDROCHLORIDE 20 MG: 20 CAPSULE, DELAYED RELEASE ORAL at 19:14

## 2024-01-01 RX ADMIN — DULOXETINE HYDROCHLORIDE 20 MG: 20 CAPSULE, DELAYED RELEASE ORAL at 08:13

## 2024-01-01 RX ADMIN — LORAZEPAM 0.5 MG: 0.5 TABLET ORAL at 09:17

## 2024-01-01 RX ADMIN — POLYETHYLENE GLYCOL 3350 17 G: 17 POWDER, FOR SOLUTION ORAL at 08:14

## 2024-01-01 RX ADMIN — SENNOSIDES AND DOCUSATE SODIUM 2 TABLET: 50; 8.6 TABLET ORAL at 09:16

## 2024-01-01 RX ADMIN — INSULIN ASPART 2 UNITS: 100 INJECTION, SOLUTION INTRAVENOUS; SUBCUTANEOUS at 17:43

## 2024-01-01 RX ADMIN — GADOBUTROL 7 ML: 604.72 INJECTION INTRAVENOUS at 14:37

## 2024-01-01 RX ADMIN — SENNOSIDES AND DOCUSATE SODIUM 2 TABLET: 50; 8.6 TABLET ORAL at 08:12

## 2024-01-01 RX ADMIN — PREDNISONE 5 MG: 5 TABLET ORAL at 09:13

## 2024-01-01 RX ADMIN — SODIUM CHLORIDE, PRESERVATIVE FREE: 5 INJECTION INTRAVENOUS at 17:25

## 2024-01-01 RX ADMIN — SODIUM CHLORIDE, PRESERVATIVE FREE: 5 INJECTION INTRAVENOUS at 04:23

## 2024-01-01 RX ADMIN — PREDNISONE 5 MG: 5 TABLET ORAL at 21:26

## 2024-01-01 RX ADMIN — SERTRALINE HYDROCHLORIDE 100 MG: 100 TABLET ORAL at 08:57

## 2024-01-01 RX ADMIN — ACETAMINOPHEN 1000 MG: 500 TABLET, FILM COATED ORAL at 08:13

## 2024-01-01 RX ADMIN — IOPAMIDOL 66 ML: 755 INJECTION, SOLUTION INTRAVENOUS at 14:36

## 2024-01-01 RX ADMIN — MELATONIN TAB 3 MG 3 MG: 3 TAB at 00:52

## 2024-01-01 RX ADMIN — MELATONIN TAB 3 MG 3 MG: 3 TAB at 21:11

## 2024-01-01 RX ADMIN — SODIUM CHLORIDE 90 ML: 9 INJECTION, SOLUTION INTRAVENOUS at 14:37

## 2024-01-01 RX ADMIN — SERTRALINE HYDROCHLORIDE 100 MG: 100 TABLET ORAL at 08:17

## 2024-01-01 RX ADMIN — ACETAMINOPHEN 1000 MG: 500 TABLET, FILM COATED ORAL at 08:17

## 2024-01-01 RX ADMIN — INSULIN ASPART 2 UNITS: 100 INJECTION, SOLUTION INTRAVENOUS; SUBCUTANEOUS at 13:00

## 2024-01-01 RX ADMIN — SODIUM CHLORIDE, PRESERVATIVE FREE: 5 INJECTION INTRAVENOUS at 18:13

## 2024-01-01 RX ADMIN — POLYETHYLENE GLYCOL 3350 17 G: 17 POWDER, FOR SOLUTION ORAL at 08:17

## 2024-01-01 RX ADMIN — INSULIN ASPART 2 UNITS: 100 INJECTION, SOLUTION INTRAVENOUS; SUBCUTANEOUS at 17:48

## 2024-01-01 RX ADMIN — SERTRALINE HYDROCHLORIDE 100 MG: 100 TABLET ORAL at 09:14

## 2024-01-01 RX ADMIN — SODIUM CHLORIDE, PRESERVATIVE FREE: 5 INJECTION INTRAVENOUS at 02:22

## 2024-01-01 RX ADMIN — ACETAMINOPHEN 1000 MG: 500 TABLET, FILM COATED ORAL at 19:14

## 2024-01-01 RX ADMIN — TC 99M MEDRONATE 25.6 MILLICURIE: 20 INJECTION, POWDER, LYOPHILIZED, FOR SOLUTION INTRAVENOUS at 10:15

## 2024-01-01 RX ADMIN — ACETAMINOPHEN 1000 MG: 500 TABLET, FILM COATED ORAL at 13:20

## 2024-01-01 RX ADMIN — PREDNISONE 5 MG: 5 TABLET ORAL at 20:24

## 2024-01-01 RX ADMIN — ACETAMINOPHEN 1000 MG: 500 TABLET, FILM COATED ORAL at 14:31

## 2024-01-01 RX ADMIN — INSULIN ASPART 2 UNITS: 100 INJECTION, SOLUTION INTRAVENOUS; SUBCUTANEOUS at 13:13

## 2024-01-01 RX ADMIN — DULOXETINE HYDROCHLORIDE 20 MG: 20 CAPSULE, DELAYED RELEASE ORAL at 20:35

## 2024-01-01 RX ADMIN — ACETAMINOPHEN 1000 MG: 500 TABLET, FILM COATED ORAL at 20:50

## 2024-01-01 RX ADMIN — ACETAMINOPHEN 1000 MG: 500 TABLET, FILM COATED ORAL at 20:24

## 2024-01-01 RX ADMIN — ACETAMINOPHEN 1000 MG: 500 TABLET, FILM COATED ORAL at 18:06

## 2024-01-01 RX ADMIN — OXYCODONE HYDROCHLORIDE 10 MG: 5 TABLET ORAL at 06:33

## 2024-01-01 RX ADMIN — MORPHINE SULFATE 15 MG: 15 TABLET, EXTENDED RELEASE ORAL at 20:24

## 2024-01-01 RX ADMIN — MELATONIN TAB 3 MG 3 MG: 3 TAB at 22:14

## 2024-01-01 RX ADMIN — SENNOSIDES AND DOCUSATE SODIUM 2 TABLET: 50; 8.6 TABLET ORAL at 08:17

## 2024-01-01 RX ADMIN — IOPAMIDOL 84 ML: 755 INJECTION, SOLUTION INTRAVENOUS at 11:05

## 2024-01-01 RX ADMIN — ACETAMINOPHEN 1000 MG: 500 TABLET, FILM COATED ORAL at 20:36

## 2024-01-01 RX ADMIN — MORPHINE SULFATE 15 MG: 15 TABLET, EXTENDED RELEASE ORAL at 08:17

## 2024-01-01 RX ADMIN — PREDNISONE 5 MG: 5 TABLET ORAL at 08:35

## 2024-01-01 RX ADMIN — INSULIN ASPART 4 UNITS: 100 INJECTION, SOLUTION INTRAVENOUS; SUBCUTANEOUS at 13:42

## 2024-01-01 RX ADMIN — ACETAMINOPHEN 1000 MG: 500 TABLET, FILM COATED ORAL at 15:03

## 2024-01-01 RX ADMIN — ACETAMINOPHEN 1000 MG: 500 TABLET, FILM COATED ORAL at 08:34

## 2024-01-01 RX ADMIN — SODIUM CHLORIDE 500 ML: 9 INJECTION, SOLUTION INTRAVENOUS at 11:51

## 2024-01-01 RX ADMIN — KIT FOR THE PREPARATION OF GALLIUM GA 68 GOZETOTIDE INJECTION 5.8 MILLICURIE: KIT INTRAVENOUS at 12:03

## 2024-01-01 RX ADMIN — POLYETHYLENE GLYCOL 3350 17 G: 17 POWDER, FOR SOLUTION ORAL at 08:57

## 2024-01-01 RX ADMIN — MORPHINE SULFATE 15 MG: 15 TABLET, EXTENDED RELEASE ORAL at 20:49

## 2024-01-01 RX ADMIN — DULOXETINE HYDROCHLORIDE 20 MG: 20 CAPSULE, DELAYED RELEASE ORAL at 21:26

## 2024-01-01 RX ADMIN — HUMAN ALBUMIN MICROSPHERES AND PERFLUTREN 3 ML: 10; .22 INJECTION, SOLUTION INTRAVENOUS at 08:40

## 2024-01-01 RX ADMIN — DULOXETINE HYDROCHLORIDE 20 MG: 20 CAPSULE, DELAYED RELEASE ORAL at 09:15

## 2024-01-01 RX ADMIN — MORPHINE SULFATE 15 MG: 15 TABLET, EXTENDED RELEASE ORAL at 20:37

## 2024-01-01 RX ADMIN — OXYCODONE HYDROCHLORIDE 10 MG: 5 TABLET ORAL at 12:59

## 2024-01-01 RX ADMIN — SODIUM CHLORIDE 64 ML: 9 INJECTION, SOLUTION INTRAVENOUS at 11:06

## 2024-01-01 RX ADMIN — DULOXETINE HYDROCHLORIDE 20 MG: 20 CAPSULE, DELAYED RELEASE ORAL at 09:14

## 2024-01-01 RX ADMIN — MORPHINE SULFATE 15 MG: 15 TABLET, EXTENDED RELEASE ORAL at 09:14

## 2024-01-01 RX ADMIN — SENNOSIDES AND DOCUSATE SODIUM 2 TABLET: 50; 8.6 TABLET ORAL at 09:15

## 2024-01-01 RX ADMIN — ACETAMINOPHEN 1000 MG: 500 TABLET, FILM COATED ORAL at 09:14

## 2024-01-01 RX ADMIN — MORPHINE SULFATE 15 MG: 15 TABLET, EXTENDED RELEASE ORAL at 13:21

## 2024-01-01 RX ADMIN — INSULIN ASPART 2 UNITS: 100 INJECTION, SOLUTION INTRAVENOUS; SUBCUTANEOUS at 13:21

## 2024-01-01 RX ADMIN — PREDNISONE 5 MG: 5 TABLET ORAL at 08:13

## 2024-01-01 RX ADMIN — PREDNISONE 5 MG: 5 TABLET ORAL at 09:16

## 2024-01-01 RX ADMIN — IOPAMIDOL 84 ML: 755 INJECTION, SOLUTION INTRAVENOUS at 10:23

## 2024-01-01 RX ADMIN — SENNOSIDES AND DOCUSATE SODIUM 2 TABLET: 50; 8.6 TABLET ORAL at 08:34

## 2024-01-01 RX ADMIN — INSULIN ASPART 1 UNITS: 100 INJECTION, SOLUTION INTRAVENOUS; SUBCUTANEOUS at 09:15

## 2024-01-01 RX ADMIN — SODIUM CHLORIDE 64 ML: 9 INJECTION, SOLUTION INTRAVENOUS at 10:25

## 2024-01-01 RX ADMIN — SERTRALINE HYDROCHLORIDE 100 MG: 100 TABLET ORAL at 09:16

## 2024-01-01 RX ADMIN — INSULIN ASPART 1 UNITS: 100 INJECTION, SOLUTION INTRAVENOUS; SUBCUTANEOUS at 08:21

## 2024-01-01 RX ADMIN — ACETAMINOPHEN 1000 MG: 500 TABLET, FILM COATED ORAL at 13:13

## 2024-01-01 RX ADMIN — PREDNISONE 5 MG: 5 TABLET ORAL at 19:14

## 2024-01-01 RX ADMIN — SODIUM CHLORIDE, PRESERVATIVE FREE: 5 INJECTION INTRAVENOUS at 20:49

## 2024-01-01 RX ADMIN — DULOXETINE HYDROCHLORIDE 20 MG: 20 CAPSULE, DELAYED RELEASE ORAL at 09:16

## 2024-01-01 RX ADMIN — ACETAMINOPHEN 1000 MG: 500 TABLET, FILM COATED ORAL at 09:17

## 2024-01-01 RX ADMIN — PREDNISONE 5 MG: 5 TABLET ORAL at 20:35

## 2024-01-01 RX ADMIN — PREDNISONE 5 MG: 5 TABLET ORAL at 08:17

## 2024-01-01 RX ADMIN — MORPHINE SULFATE 15 MG: 15 TABLET, EXTENDED RELEASE ORAL at 21:26

## 2024-01-01 RX ADMIN — DULOXETINE HYDROCHLORIDE 20 MG: 20 CAPSULE, DELAYED RELEASE ORAL at 20:49

## 2024-01-01 RX ADMIN — MORPHINE SULFATE 15 MG: 15 TABLET, EXTENDED RELEASE ORAL at 19:14

## 2024-01-01 RX ADMIN — SODIUM CHLORIDE, PRESERVATIVE FREE: 5 INJECTION INTRAVENOUS at 12:16

## 2024-01-01 RX ADMIN — SODIUM CHLORIDE 1000 ML: 9 INJECTION, SOLUTION INTRAVENOUS at 12:09

## 2024-01-01 RX ADMIN — MORPHINE SULFATE 15 MG: 15 TABLET, EXTENDED RELEASE ORAL at 09:17

## 2024-01-01 RX ADMIN — PREDNISONE 5 MG: 5 TABLET ORAL at 20:50

## 2024-01-01 RX ADMIN — MELATONIN TAB 3 MG 3 MG: 3 TAB at 22:54

## 2024-01-01 RX ADMIN — INSULIN ASPART 2 UNITS: 100 INJECTION, SOLUTION INTRAVENOUS; SUBCUTANEOUS at 17:25

## 2024-01-01 RX ADMIN — DULOXETINE HYDROCHLORIDE 20 MG: 20 CAPSULE, DELAYED RELEASE ORAL at 08:17

## 2024-01-01 RX ADMIN — MORPHINE SULFATE 15 MG: 15 TABLET, EXTENDED RELEASE ORAL at 08:14

## 2024-01-01 RX ADMIN — SODIUM CHLORIDE, PRESERVATIVE FREE: 5 INJECTION INTRAVENOUS at 23:54

## 2024-01-01 RX ADMIN — SERTRALINE HYDROCHLORIDE 100 MG: 100 TABLET ORAL at 08:13

## 2024-01-01 RX ADMIN — DULOXETINE HYDROCHLORIDE 20 MG: 20 CAPSULE, DELAYED RELEASE ORAL at 20:24

## 2024-01-01 RX ADMIN — INSULIN ASPART 1 UNITS: 100 INJECTION, SOLUTION INTRAVENOUS; SUBCUTANEOUS at 08:18

## 2024-01-01 ASSESSMENT — ACTIVITIES OF DAILY LIVING (ADL)
ADLS_ACUITY_SCORE: 53
ADLS_ACUITY_SCORE: 47
ADLS_ACUITY_SCORE: 47
DRESSING/BATHING: BATHING DIFFICULTY, ASSISTANCE 1 PERSON;DRESSING DIFFICULTY, ASSISTANCE 1 PERSON
ADLS_ACUITY_SCORE: 53
ADLS_ACUITY_SCORE: 53
ADLS_ACUITY_SCORE: 54
ADLS_ACUITY_SCORE: 52
ADLS_ACUITY_SCORE: 38
ADLS_ACUITY_SCORE: 53
ADLS_ACUITY_SCORE: 52
ADLS_ACUITY_SCORE: 38
DRESSING/BATHING_DIFFICULTY: YES
ADLS_ACUITY_SCORE: 53
ADLS_ACUITY_SCORE: 52
ADLS_ACUITY_SCORE: 38
ADLS_ACUITY_SCORE: 55
ADLS_ACUITY_SCORE: 47
ADLS_ACUITY_SCORE: 48
ADLS_ACUITY_SCORE: 47
ADLS_ACUITY_SCORE: 55
ADLS_ACUITY_SCORE: 55
ADLS_ACUITY_SCORE: 50
ADLS_ACUITY_SCORE: 52
ADLS_ACUITY_SCORE: 41
ADLS_ACUITY_SCORE: 53
ADLS_ACUITY_SCORE: 55
ADLS_ACUITY_SCORE: 55
ADLS_ACUITY_SCORE: 52
ADLS_ACUITY_SCORE: 52
ADLS_ACUITY_SCORE: 48
DEPENDENT_IADLS:: TRANSPORTATION
ADLS_ACUITY_SCORE: 54
ADLS_ACUITY_SCORE: 53
ADLS_ACUITY_SCORE: 48
ADLS_ACUITY_SCORE: 47
ADLS_ACUITY_SCORE: 38
ADLS_ACUITY_SCORE: 47
ADLS_ACUITY_SCORE: 53
ADLS_ACUITY_SCORE: 38
ADLS_ACUITY_SCORE: 53
ADLS_ACUITY_SCORE: 52
ADLS_ACUITY_SCORE: 47
ADLS_ACUITY_SCORE: 38
ADLS_ACUITY_SCORE: 53
ADLS_ACUITY_SCORE: 47
ADLS_ACUITY_SCORE: 41
ADLS_ACUITY_SCORE: 53
ADLS_ACUITY_SCORE: 52
ADLS_ACUITY_SCORE: 36
ADLS_ACUITY_SCORE: 48
DOING_ERRANDS_INDEPENDENTLY_DIFFICULTY: YES
ADLS_ACUITY_SCORE: 52
ADLS_ACUITY_SCORE: 52
ADLS_ACUITY_SCORE: 34
ADLS_ACUITY_SCORE: 53
ADLS_ACUITY_SCORE: 52
ADLS_ACUITY_SCORE: 52
ADLS_ACUITY_SCORE: 53
DIFFICULTY_EATING/SWALLOWING: NO
ADLS_ACUITY_SCORE: 52
ADLS_ACUITY_SCORE: 55
ADLS_ACUITY_SCORE: 55
ADLS_ACUITY_SCORE: 52
ADLS_ACUITY_SCORE: 53
ADLS_ACUITY_SCORE: 53
ADLS_ACUITY_SCORE: 55
ADLS_ACUITY_SCORE: 53
ADLS_ACUITY_SCORE: 47
ADLS_ACUITY_SCORE: 53
ADLS_ACUITY_SCORE: 52
ADLS_ACUITY_SCORE: 53
ADLS_ACUITY_SCORE: 54
ADLS_ACUITY_SCORE: 53
ADLS_ACUITY_SCORE: 50
ADLS_ACUITY_SCORE: 47
ADLS_ACUITY_SCORE: 53
WALKING_OR_CLIMBING_STAIRS: AMBULATION DIFFICULTY, ASSISTANCE 1 PERSON;STAIR CLIMBING DIFFICULTY, ASSISTANCE 1 PERSON;TRANSFERRING DIFFICULTY, ASSISTANCE 1 PERSON
ADLS_ACUITY_SCORE: 53
ADLS_ACUITY_SCORE: 48
ADLS_ACUITY_SCORE: 53
CONCENTRATING,_REMEMBERING_OR_MAKING_DECISIONS_DIFFICULTY: NO
ADLS_ACUITY_SCORE: 53
ADLS_ACUITY_SCORE: 53
ADLS_ACUITY_SCORE: 55
ADLS_ACUITY_SCORE: 53
ADLS_ACUITY_SCORE: 51
ADLS_ACUITY_SCORE: 38
WALKING_OR_CLIMBING_STAIRS_DIFFICULTY: YES
ADLS_ACUITY_SCORE: 56
ADLS_ACUITY_SCORE: 53
ADLS_ACUITY_SCORE: 36
ADLS_ACUITY_SCORE: 53
ADLS_ACUITY_SCORE: 52
ADLS_ACUITY_SCORE: 53
WEAR_GLASSES_OR_BLIND: NO
ADLS_ACUITY_SCORE: 53
ADLS_ACUITY_SCORE: 41
ADLS_ACUITY_SCORE: 47
ADLS_ACUITY_SCORE: 38
TOILETING_ISSUES: NO
ADLS_ACUITY_SCORE: 53
ADLS_ACUITY_SCORE: 53
ADLS_ACUITY_SCORE: 51
ADLS_ACUITY_SCORE: 54
ADLS_ACUITY_SCORE: 53
ADLS_ACUITY_SCORE: 47
ADLS_ACUITY_SCORE: 54
ADLS_ACUITY_SCORE: 52
ADLS_ACUITY_SCORE: 50
ADLS_ACUITY_SCORE: 52
ADLS_ACUITY_SCORE: 52
ADLS_ACUITY_SCORE: 53
ADLS_ACUITY_SCORE: 52
CHANGE_IN_FUNCTIONAL_STATUS_SINCE_ONSET_OF_CURRENT_ILLNESS/INJURY: YES
ADLS_ACUITY_SCORE: 38
ADLS_ACUITY_SCORE: 53
ADLS_ACUITY_SCORE: 50
ADLS_ACUITY_SCORE: 52
ADLS_ACUITY_SCORE: 47
ADLS_ACUITY_SCORE: 53
ADLS_ACUITY_SCORE: 54
ADLS_ACUITY_SCORE: 53
ADLS_ACUITY_SCORE: 55
ADLS_ACUITY_SCORE: 54
ADLS_ACUITY_SCORE: 53

## 2024-01-01 ASSESSMENT — COLUMBIA-SUICIDE SEVERITY RATING SCALE - C-SSRS
1. IN THE PAST MONTH, HAVE YOU WISHED YOU WERE DEAD OR WISHED YOU COULD GO TO SLEEP AND NOT WAKE UP?: NO
6. HAVE YOU EVER DONE ANYTHING, STARTED TO DO ANYTHING, OR PREPARED TO DO ANYTHING TO END YOUR LIFE?: NO
2. HAVE YOU ACTUALLY HAD ANY THOUGHTS OF KILLING YOURSELF IN THE PAST MONTH?: NO

## 2024-09-05 PROBLEM — I95.9 HYPOTENSION, UNSPECIFIED HYPOTENSION TYPE: Status: ACTIVE | Noted: 2024-01-01

## 2024-09-05 PROBLEM — I48.91 NEW ONSET ATRIAL FIBRILLATION (H): Status: ACTIVE | Noted: 2024-01-01

## 2024-09-05 PROBLEM — C61 PROSTATE CANCER (H): Status: ACTIVE | Noted: 2024-01-01

## 2024-09-05 NOTE — H&P
[facility]  History and Physical - Hospitalist Service       Date of Admission:  9/5/2024  PRIMARY CARE PROVIDER:    Laz Lewis MD    Assessment & Plan   Migue Beach is a 78 year old male registered to short-stay/observation on 9/5/2024 due to fall, new onset atrial fib, hypotension.      Past medical history significant for HTN, HLP, DM2, MDD with anxiety, Insomnia, Metastatic prostate cancer, Chronic pain, Cognitive impairment, Chronic anemia, Slow transit constipation.    Patient presented to the ED via ambulance from home.  Patient's wife reported that the patient has been dizzy and has had several falls over the past few days.  EMS reported that the patient was hypotensive and was in atrial fib.      Work-up in the ED included an EKG that revealed sinus rhythm with PACs, RSR' and prolonged QTc.  CMP revealed a sodium of 134, creatinine of 0.57 with a GFR greater than 90, calcium of 8.4, albumin of 3.2, alkaline phosphatase of 352, AST of 76, glucose of 187 otherwise unremarkable.  CBC with differential revealed a hemoglobin of 9.3, hematocrit of 29.4, platelet count of 145, RBC count of 3.23, RDW of 15.3, absolute lymphocytes of 0.7 otherwise unremarkable.  High-sensitivity troponin T was within normal limits at 17.  Total CK was elevated at 741.    Patient received IV fluid bolus 500 cc while in the ED.      Per report from Dr. Trierweiler, patient had a fall the night prior to presentation and remained on the floor for ~ 3 hours.  Family was unable to get the patient up off the floor and led to EMS being contacted.  Upon assessment by EMS patient was hypotensive (80/50) and in atrial fib with RVR (HR in the 150's).  En route to [facility] ED patient spontaneously converted back to normal sinus rhythm.  While in the ED,  patient's family reported that the patient has not been eating, drink fluids and has been falling frequently the past few days.      Fall   Bilateral elbow  abrasions  Mild rhabdomyolysis  - Follow up on Head CT and Chest CT/PE study with contrast.    - Obtain/Follow up on UA.    - IV fluids at 100 ml/hr.    - PT consult requested.    - Orthostatic BP and pulse checks ordered.    - Repeat total CK 9/6.      New onset atrial fib with RVR (Resolved-spontaneous conversion back to NSR)  - Monitor on telemetry.    - ECHO ordered.    - Check TSH/Free T4 level, Magnesium level and Phosphorus level.    - Hold off on starting anticoagulation therapy at this time due to frequent falls and acute on chronic anemia.      Hypotension (Resolved)  History of HTN  *Suspected secondary to dehydration and has improved following spontaneous conversion back to NSR and IV fluids.    *Previously on amlodipine but was discontinued 10/2023.    - IV fluids as above.    - Vital signs every 4 hours.      Failure to thrive  Hypoalbuminemia  - IV fluids as above.    - PT consult as above.    - SW/CM consult requested.      Acute on chronic anemia  *Baseline HGB between 11-12.5.  Currently at 9.3.    - CBC ordered for 9/5.      Acute thrombocytopenia  - CBC ordered for 9/5.      Pseudohyponatremia  Sodium of 134 and corrects to 136 due to glucose level of 187.    - BMP ordered for 9/5.      HLP  *No longer on statin therapy.      Elevated alk phos level  *Suspect secondary to dehydration and metastatic prostate cancer.    - IV fluids.    - Hepatic panel ordered for 9/5.      Type 2 DM  Hyperglycemia  PTA regimen includes: Metformin 1000 mg daily with supper.  - Check A1c level.    - Hold PTA metformin.  Resume at discharge.    - Medium intensity sliding scale insulin ordered.  - Glucose checks ordered.    - Hypoglycemic protocol in place.      MDD with anxiety  - Resumed on PTA Cymbalta 20 mg BID and Zoloft 100 mg/d.    - Resumed on PTA Ativan 0.5 mg BID and PRN 0.5 mg BID.      Insomnia  - Resumed on PTA PRN melatonin 3 mg at bedtime.      Metastatic prostate cancer  Chronic pain  *Widespread  metastasis to the spine, skull (left temporal bone) and ribs.  Follows with [facility].  Completed 10 sessions of chemo ~ 1 month ago and had planned to have 3-4 month break before restarting treatment, however PSA level significantly increased and he will be starting Pluvicto soon.    - Resumed on scheduled APAP 1000 mg TID.    - Resumed on PTA MS Contin 15 mg TID and PRN 15 mg daily for sever pain.    - Resumed on PTA PRN oxycodone 10 mg every 4 hours.    - Resumed on PTA prednisone 5 mg BID.      Cognitive impairment   *SLUMS score of 9/30 from 9/8/2023.      Slow transit constipation  - Bowel medications available.      Clinically Significant Risk Factors Present on Admission          # Hypocalcemia: Lowest Ca = 8.4 mg/dL in last 2 days, will monitor and replace as appropriate     # Hypoalbuminemia: Lowest albumin = 3.2 g/dL at 9/5/2024 11:50 AM, will monitor as appropriate        # Anemia: based on hgb <11    Diet: Regular  DVT Prophylaxis: Pneumatic Compression Devices  Gorman Catheter: Not present  Lines: None     Cardiac Monitoring: ORDERED  Code Status: FULL CODE         Disposition Plan   Observation status.  Anticipate patient will be able to return home in 24-48 hours once work-up is completed for new onset atrial fib and hypotension as well as safe dispo plan has been established.      Medically Ready for Discharge: Anticipated Tomorrow    The patient's care was discussed with the Patient, Patient's Family, and Dr. Trierweiler .  Reviewed ED notes and previous TCU discharge from 10/2023 .      The patient has been discussed with Dr. Taylor, who agrees with the assessment and plan at this time.    Tan Gu PA-C  [facility]  Securely message with the Vocera Web Console (learn more here)    ______________________________________________________________________    Chief Complaint   Fall and found down, dizziness with new onset atrial fib with RVR and  hypotension    History is obtained from Dr. Trierweiler, the patient and EMR.      History of Present Illness   Migue Beach is a 78 year old male registered to short-stay/observation on 9/5/2024 due to fall, new onset atrial fib, hypotension.      Past medical history significant for HTN, HLP, DM2, MDD with anxiety, Insomnia, Metastatic prostate cancer, Chronic pain, Cognitive impairment, Chronic anemia, Slow transit constipation.    Patient presented to the ED via ambulance from home.  Patient's wife reported that the patient has been dizzy and has had several falls over the past few days.  EMS reported that the patient was hypotensive and was in atrial fib.      Work-up in the ED included an EKG that revealed sinus rhythm with PACs, RSR' and prolonged QTc.  CMP revealed a sodium of 134, creatinine of 0.57 with a GFR greater than 90, calcium of 8.4, albumin of 3.2, alkaline phosphatase of 352, AST of 76, glucose of 187 otherwise unremarkable.  CBC with differential revealed a hemoglobin of 9.3, hematocrit of 29.4, platelet count of 145, RBC count of 3.23, RDW of 15.3, absolute lymphocytes of 0.7 otherwise unremarkable.  High-sensitivity troponin T was within normal limits at 17.  Total CK was elevated at 741.    Patient received IV fluid bolus 500 cc while in the ED.      Per report from Dr. Trierweiler, patient had a fall the night prior to presentation and remained on the floor for ~ 3 hours.  Family was unable to get the patient up off the floor and led to EMS being contacted.  Upon assessment by EMS patient was hypotensive (80/50) and in atrial fib with RVR (HR in the 150's).  En route to [facility] ED patient spontaneously converted back to normal sinus rhythm.  While in the ED,  patient's family reported that the patient has not been eating, drink fluids and has been falling frequently the past few days.      Patient was seen in the ED where he was resting comfortably on the gurney upon arrival.  Patient's  wife was present in the room.  We briefly reviewed events that led to patient's presentation to the ED.  Upon review the patient's wife indicated that for the past 3 to 4 days he has had decreased oral intake (food and liquid).  He has also had multiple falls within this time.  Which they believe has been 3.  She indicated the patient fell last night likely around 1030 and was not found until 5:30 in the morning by staff at their senior living complex.    Reviewed that patient was in a new heart rhythm (atrial fibrillation with RVR) that spontaneously converted back to normal rhythm while en route to the ED.  We also discussed that patient's blood pressure was significantly low but is since resolved.    Upon questioning, appears patient has had increased fatigue and weakness for probably about 2 weeks.  However it is difficult to assess as patient does get morphine 3 times daily and typically sleeps after receiving this medication.  He currently has some wounds present on his elbows due to the frequent falls that have occurred.    Patient indicated that he had a headache this morning and they assumed that he struck his head on the fall.  Patient's wife indicated that staff found him face down on and on carpeted floor.  He has been experiencing occasional nausea and has chronic constipation.  He frequently experiences back pain.  And he has been experiencing lightheadedness upon standing.    Patient resides in [facility] with his wife.  He quit smoking cigarettes approximately 40 years ago.  He quit consuming alcohol approximately 6 months ago.  He does not utilize recreational drugs.  He utilizes a walker, cane, wheelchair depending on the situation.  He does not utilize supplemental oxygen or CPAP machine.    Discussed and reviewed CODE STATUS and patient elected to be full code.      Past Medical History    I have reviewed this patient's medical history and updated it with pertinent information if  needed.   Past Medical History:   Diagnosis Date    Depressive disorder     anxiety    Hypertension    HTN, HLP, DM2, MDD with anxiety, Insomnia, Metastatic prostate cancer, Chronic pain, Cognitive impairment, Chronic anemia, Slow transit constipation.    Prior to Admission Medications   Prior to Admission Medications   Prescriptions Last Dose Informant Patient Reported? Taking?   DULoxetine (CYMBALTA) 20 MG capsule  Self Yes No   Sig: Take 20 mg by mouth 2 times daily   LORazepam (ATIVAN) 1 MG tablet   No No   Sig: Take 0.5 tablets (0.5 mg) by mouth every 6 hours as needed for anxiety, nausea or pain   Lidocaine (LIDOCARE) 4 % Patch   Yes No   Sig: Place 1 patch onto the skin every 24 hours To prevent lidocaine toxicity, patient should be patch free for 12 hrs daily.   acetaminophen (TYLENOL) 500 MG tablet   Yes No   Sig: Take 1,000 mg by mouth 3 times daily   diphenhydrAMINE-zinc acetate (BENADRYL) 1-0.1 % external cream  Self Yes No   Sig: Apply topically 3 times daily as needed for itching   glucose 40 % (400 mg/mL) gel   No No   Sig: Take 15-30 g by mouth every 15 minutes as needed for low blood sugar (for blood sugar <60 mg/dl)   insulin  UNIT/ML vial   Yes No   Sig: Inject 6 Units Subcutaneous daily Hold for BG<100   insulin aspart (NOVOLOG PEN) 100 UNIT/ML pen   No No   Sig: Inject 1-7 Units Subcutaneous 3 times daily (before meals) Correction Scale - MEDIUM INSULIN RESISTANCE DOSING     Do Not give Correction Insulin if Pre-Meal BG less than 140.   For Pre-Meal  - 189 give 1 unit.   For Pre-Meal  - 239 give 2 units.   For Pre-Meal  - 289 give 3 units.   For Pre-Meal  - 339 give 4 units.   For Pre-Meal - 399 give 5 units.   For Pre-Meal -449 give 6 units  For Pre-Meal BG greater than or equal to 450 give 7 units.   To be given with prandial insulin, and based on pre-meal blood glucose.    Notify provider if glucose greater than or equal to 350 mg/dL after  administration of correction dose.  If given at mealtime, administer within 30 minutes of start of meal.   insulin aspart (NOVOLOG PEN) 100 UNIT/ML pen   No No   Sig: Inject 1-5 Units Subcutaneous At Bedtime MEDIUM INSULIN RESISTANCE DOSING    Do Not give Bedtime Correction Insulin if BG less than  200.   For  - 249 give 1 units.   For  - 299 give 2 units.   For  - 349 give 3 units.   For  -399 give 4 units.   For BG greater than or equal to 400 give 5 units.  Notify provider if glucose greater than or equal to 350 mg/dL after administration of correction dose.  If given at mealtime, administer within 30 minutes of start of meal.   magnesium oxide (MAG-OX) 400 MG tablet   No No   Sig: Take 1 tablet (400 mg) by mouth daily   melatonin 1 MG TABS tablet   No No   Sig: Take 3 tablets (3 mg) by mouth nightly as needed for sleep   metFORMIN (GLUCOPHAGE XR) 500 MG 24 hr tablet  Self Yes No   Sig: Take 1,000 mg by mouth daily (with dinner)   naloxone (NARCAN) 4 MG/0.1ML nasal spray   No No   Sig: Spray 1 spray (4 mg) into one nostril alternating nostrils as needed for opioid reversal every 2-3 minutes until assistance arrives   ondansetron (ZOFRAN ODT) 4 MG ODT tab   No No   Sig: Take 1 tablet (4 mg) by mouth every 6 hours as needed for nausea or vomiting   oxyCODONE (ROXICODONE) 5 MG tablet   No No   Sig: Take 1.5 tablets (7.5 mg) by mouth every 4 hours   predniSONE (DELTASONE) 5 MG tablet   No No   Sig: Take 1 tablet a day for the next 7 days then 1 tablet every other day for 7 days then off.   rosuvastatin (CRESTOR) 20 MG tablet   Yes No   Sig: Take 20 mg by mouth daily   senna-docusate (SENOKOT-S/PERICOLACE) 8.6-50 MG tablet   No No   Sig: Take 1 tablet by mouth 2 times daily as needed for constipation   senna-docusate (SENOKOT-S/PERICOLACE) 8.6-50 MG tablet   Yes No   Sig: Take 1 tablet by mouth daily   sertraline (ZOLOFT) 100 MG tablet  Self Yes No   Sig: Take 100 mg by mouth daily       Facility-Administered Medications: None     Allergies   No Known Allergies    Physical Exam   Vital Signs: Temp: 97  F (36.1  C) Temp src: Temporal BP: 129/73 Pulse: 101   Resp: 12 SpO2: 95 % O2 Device: None (Room air)    Weight: 0 lbs 0 oz    Constitutional: Awake, alert, cooperative, no apparent distress.    ENT: Normocephalic, without obvious abnormality, atraumatic, oral pharynx with moist mucus membranes.  Eyes extra occular movements intact.  Normal sclera.    Neck: Supple, symmetrical, trachea midline, no adenopathy.  Pulmonary: No increased work of breathing, fair air exchange, clear to auscultation bilaterally, no crackles or wheezing.  Cardiovascular: Regular rhythm and slightly tachy, normal S1 and S2, no S3 or S4, and no murmur noted.  GI: Normal bowel sounds, soft, non-distended, non-tender.    Skin/Integumen: Visualized skin appeared clear.  Neuro: CN II-XII grossly intact.    Extremities: No lower extremity edema noted, and calves are non-tender to palpation bilaterally.     Medical Decision Making       Please see A&P for additional details of medical decision making.  GREATER THAN 75 MINUTES SPENT BY ME on the date of service doing chart review, history, exam, documentation & further activities per the note.       Data   Data reviewed today: I reviewed all medications, new labs and imaging results over the last 24 hours. I personally reviewed the EKG tracing showing sinus rhythm with PACs .      I have personally reviewed the following data over the past 24 hrs:    6.1  \   9.3 (L)   / 145 (L)     134 (L) 100 13.3 /  187 (H)   4.1 23 0.57 (L) \     ALT: 10 AST: 76 (H) AP: 352 (H) TBILI: 0.3   ALB: 3.2 (L) TOT PROTEIN: 6.7 LIPASE: N/A     Trop: 17 BNP: N/A     TSH: 0.65 T4: N/A A1C: 7.6 (H)       Imaging results reviewed over the past 24 hrs:   Recent Results (from the past 24 hour(s))   CT Chest Pulmonary Embolism w Contrast    Narrative    CT CHEST PULMONARY EMBOLISM W CONTRAST 9/5/2024 2:49  PM    CLINICAL HISTORY: tachycardia, immobility  TECHNIQUE: CT angiogram chest during arterial phase injection IV  contrast. 2D and 3D MIP reconstructions were performed by the CT  technologist. Dose reduction techniques were used.     CONTRAST: 66 cc [id number]    COMPARISON: Chest CT 3/28/2024    FINDINGS:  ANGIOGRAM CHEST: Pulmonary arteries are normal caliber and negative  for pulmonary emboli. Thoracic aorta is not well opacified and is  indeterminate for dissection. No CT evidence of right heart strain.    LUNGS AND PLEURA: No focal airspace consolidation or pleural effusion.  There is some scattered bronchial wall thickening and mucous plugging.  Scattered small pulmonary nodules are stable from prior exam. No  definite new or enlarging nodules.    MEDIASTINUM/AXILLAE: Interval enlargement of mediastinal and hilar  lymphadenopathy with representative right hilar lymph node measuring  measuring 2.9 cm in short axis, previously 1.7 cm (series 5 image 117)  and subcarinal node measuring 2.4 cm in short axis, previously 1.4 cm   (series 5 image 141).    CORONARY ARTERY CALCIFICATION: Severe.    UPPER ABDOMEN: Unremarkable.    MUSCULOSKELETAL: Mixed lytic and sclerotic osseous lesions are again  noted. No definite acute bony abnormality.      Impression    IMPRESSION:  1.  Possible mild bronchitis.   2.  No pulmonary embolus.  3.  Interval enlargement of mediastinal and hilar lymphadenopathy.  4.  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    SEBLE CALLAHAN MD         SYSTEM ID:  [id number]   Head CT w/o contrast    Narrative    CT SCAN OF THE HEAD WITHOUT CONTRAST September 5, 2024 2:52 PM     HISTORY: Closed head injury.    TECHNIQUE: Axial images of the head and coronal reformations without  IV contrast material. Radiation dose for this scan was reduced using  automated exposure control, adjustment of the mA and/or kV according  to patient size, or iterative reconstruction  technique.    COMPARISON: Brain MR 9/3/2023. Head CT 9/3/2023.    FINDINGS: No acute intracranial hemorrhage. No mass effect or midline  shift. Mild diffuse parenchymal volume loss. Ventricular size is  within normal limits without evidence of hydrocephalus. Gray-white  matter differentiation appears intact.    Mild scattered mucosal thickening in the paranasal sinuses. Mild  mottled and lytic appearance of the left parietal bone concerning for  bony metastases. This appears more conspicuous since 3/3/2023.      Impression    IMPRESSION:     1. No acute intracranial hemorrhage, mass effect, or herniation.  2. Mottled appearance of the left parietal bone concerning for osseous  metastases. This is more conspicuous since 9/3/2023.      CALEB URENA MD         SYSTEM ID:  [id number]

## 2024-09-05 NOTE — ED NOTES
[facility]  ED Nurse Handoff Report    ED Chief complaint: Hypotension, A-fib, and Dizziness      ED Diagnosis:   Final diagnoses:   Generalized weakness   New onset atrial fibrillation (H)   Hypotension, unspecified hypotension type - IMPROVED   Prostate cancer (H)       Code Status: MD to determine     Allergies: No Known Allergies    Patient Story: hypotension  Focused Assessment:  Patient BIBA from home. Wife reports patient has been dizzy and has had some falls over the last couple days. EMS arrived and found patient to be hypotensive and in A-fib. Self converted upon ED arrival. Will be admitted for observation    Treatments and/or interventions provided: see MAR  Patient's response to treatments and/or interventions: TBD    To be done/followed up on inpatient unit:  close montoring    Does this patient have any cognitive concerns?:  na    Activity level - Baseline/Home:  Independent  Activity Level - Current:   Stand with Assist    Patient's Preferred language: English   Needed?: No    Isolation: None  Infection: Not Applicable  na  Patient tested for COVID 19 prior to admission: NO  Bariatric?: No    Vital Signs:   Vitals:    09/05/24 1230 09/05/24 1234 09/05/24 1245 09/05/24 1250   BP: 103/69   129/73   Pulse: 96 97 97 101   Resp:  25 12    Temp:       TempSrc:       SpO2:  94% 95%        Cardiac Rhythm:Cardiac Rhythm: Atrial fibrillation    Was the PSS-3 completed:   Yes  What interventions are required if any?               Family Comments: na  OBS brochure/video discussed/provided to patient/family: Yes              Name of person given brochure if not patient: na              Relationship to patient: na    For the majority of the shift this patient's behavior was Green.   Behavioral interventions performed were none.    ED NURSE PHONE NUMBER: *[id number]

## 2024-09-05 NOTE — ED NOTES
Bed: ST03  Expected date:   Expected time:   Means of arrival:   Comments:   78M new melania, hpotension. ETA [id number]

## 2024-09-05 NOTE — PHARMACY-ADMISSION MEDICATION HISTORY
Pharmacist Admission Medication History    Admission medication history is complete. The information provided in this note is only as accurate as the sources available at the time of the update.    Information Source(s): Facility (U/NH/) medication list/MAR and CareEverywhere/SureScripts via N/A    Pertinent Information: none    Changes made to PTA medication list:  Added: MS Contin, Lorazepam  Deleted: novolog, NPH, Lidocaine patch, rosuvastatin  Changed: Lorazepam, Metformin, Prednisone, Senna S, Oxycodone    Allergies reviewed with patient and updates made in EHR: no    Medication History Completed By: Ramo Larkin RPH 9/5/2024 2:45 PM    PTA Med List   Medication Sig Last Dose    acetaminophen (TYLENOL) 500 MG tablet Take 1,000 mg by mouth 3 times daily 9/4/2024 at 1908    diphenhydrAMINE-zinc acetate (BENADRYL) 1-0.1 % external cream Apply topically 3 times daily as needed for itching Unknown    DULoxetine (CYMBALTA) 20 MG capsule Take 20 mg by mouth 2 times daily 9/4/2024 at 1908    glucose 40 % (400 mg/mL) gel Take 15-30 g by mouth every 15 minutes as needed for low blood sugar (for blood sugar <60 mg/dl) Unknown    LORazepam (ATIVAN) 0.5 MG tablet Take 0.5 mg by mouth 2 times daily as needed for anxiety. At least 6 hours after scheduled dose 5/5/2024 at 2052    LORazepam (ATIVAN) 0.5 MG tablet Take 0.5 mg by mouth 2 times daily. 9/4/2024 at 1908    magnesium oxide (MAG-OX) 400 MG tablet Take 1 tablet (400 mg) by mouth daily 9/4/2024 at 0822    melatonin 1 MG TABS tablet Take 3 tablets (3 mg) by mouth nightly as needed for sleep Unknown    metFORMIN (GLUCOPHAGE) 500 MG tablet Take 500 mg by mouth 2 times daily (with meals). 9/4/2024 at 1908    morphine (MS CONTIN) 15 MG CR tablet Take 15 mg by mouth daily as needed for severe pain. 8/28/2024 at 0505    morphine (MS CONTIN) 15 MG CR tablet Take 15 mg by mouth 3 times daily. 9/4/2024 at 1908    naloxone (NARCAN) 4 MG/0.1ML nasal spray Spray 1 spray (4 mg)  into one nostril alternating nostrils as needed for opioid reversal every 2-3 minutes until assistance arrives 5/4/2024    ondansetron (ZOFRAN ODT) 4 MG ODT tab Take 1 tablet (4 mg) by mouth every 6 hours as needed for nausea or vomiting 9/5/2024 at 2051    oxyCODONE IR (ROXICODONE) 10 MG tablet Take 10 mg by mouth every 4 hours as needed for severe pain (pain greater than 5/10 on numerical pain scale). 9/5/2024 at 0626    predniSONE (DELTASONE) 5 MG tablet Take 5 mg by mouth 2 times daily. 9/4/2024 at 1908    senna-docusate (SENOKOT-S/PERICOLACE) 8.6-50 MG tablet Take 2 tablets by mouth daily. 9/4/2024 at 0822    senna-docusate (SENOKOT-S/PERICOLACE) 8.6-50 MG tablet Take 1 tablet by mouth 2 times daily as needed for constipation Unknown    sertraline (ZOLOFT) 100 MG tablet Take 100 mg by mouth every morning. 9/4/2024 at 0822

## 2024-09-05 NOTE — ED TRIAGE NOTES
Patient BIBA from home. Wife reports patient has been dizzy and has had some falls over the last couple days. EMS arrived and found patient to be hypotensive and in A-fib. Patient has no hx of a-fib. Patient has prostate cancer which he takes morphine for. Patient A&Ox4.     Triage Assessment (Adult)       Row Name 09/05/24 1145          Triage Assessment    Airway WDL WDL        Respiratory WDL    Respiratory WDL WDL        Skin Circulation/Temperature WDL    Skin Circulation/Temperature WDL X   pale        Cardiac WDL    Cardiac Rhythm Atrial fibrillation        Peripheral/Neurovascular WDL    Peripheral Neurovascular WDL WDL        Cognitive/Neuro/Behavioral WDL    Cognitive/Neuro/Behavioral WDL WDL

## 2024-09-05 NOTE — ED PROVIDER NOTES
Emergency Department Note      History of Present Illness     Chief Complaint   Hypotension, A-fib, and Dizziness      HPI   Migue Beach is a 78 year old male with a history of metastatic prostate cancer presenting to us because of generalized weakness and falls.  Wife notes that he has been slowly getting worse over the past couple of weeks with increasing confusion, weakness, and poor oral intake.  She also notes that she is having more more challenges helping to get out of a chair or out of bed and is feeling as though she is unable to care for him in his current situation.    In the night, he appears to have gotten up and falling in the kitchen.  He does not recall why he was in the kitchen or recall the fall itself.  He was unable to get up through the night and eventually was found this morning by an aide.  He did denies suffering any serious trauma, having no complaints of pain at this time.  However, EMS found him to be significantly hypotensive and tachycardic on scene.  In fact, he was initially in atrial fibrillation, but self converted en route to the hospital.    Independent Historian   Wife as detailed above.    Review of External Notes   None    Past Medical History     Medical History and Problem List   Past Medical History:   Diagnosis Date    Depressive disorder     Hypertension        Medications   acetaminophen (TYLENOL) 500 MG tablet  diphenhydrAMINE-zinc acetate (BENADRYL) 1-0.1 % external cream  DULoxetine (CYMBALTA) 20 MG capsule  glucose 40 % (400 mg/mL) gel  insulin aspart (NOVOLOG PEN) 100 UNIT/ML pen  insulin aspart (NOVOLOG PEN) 100 UNIT/ML pen  insulin  UNIT/ML vial  Lidocaine (LIDOCARE) 4 % Patch  LORazepam (ATIVAN) 1 MG tablet  magnesium oxide (MAG-OX) 400 MG tablet  melatonin 1 MG TABS tablet  metFORMIN (GLUCOPHAGE XR) 500 MG 24 hr tablet  naloxone (NARCAN) 4 MG/0.1ML nasal spray  ondansetron (ZOFRAN ODT) 4 MG ODT tab  oxyCODONE (ROXICODONE) 5 MG tablet  predniSONE  (DELTASONE) 5 MG tablet  rosuvastatin (CRESTOR) 20 MG tablet  senna-docusate (SENOKOT-S/PERICOLACE) 8.6-50 MG tablet  senna-docusate (SENOKOT-S/PERICOLACE) 8.6-50 MG tablet  sertraline (ZOLOFT) 100 MG tablet        Surgical History   Past Surgical History:   Procedure Laterality Date    COLONOSCOPY         Physical Exam     Patient Vitals for the past 24 hrs:   BP Temp Temp src Pulse Resp SpO2   09/05/24 1151 96/61 -- -- 100 19 96 %   09/05/24 1149 -- 97  F (36.1  C) Temporal -- -- --   09/05/24 1148 -- -- -- 100 13 97 %   09/05/24 1144 96/53 -- -- 101 20 97 %     Physical Exam  General: Frail appearing elderly man resting supine on the bed providing appropriate history.    Eye:  Pupils are equal, round, and reactive.  Extraocular movements intact.    ENT:  No rhinorrhea.  Moist mucus membranes.  Normal tongue and tonsil.    Cardiac: Mildly tachycardic but regular.  No murmurs, gallops, or rubs.    Pulmonary:  Clear to auscultation bilaterally.  No wheezes, rales, or rhonchi.    Abdomen:  Positive bowel sounds.  Abdomen is soft and non-distended, without focal tenderness.    Musculoskeletal:  Normal movement of all extremities without evidence for deficit.    Skin:  Warm and dry without rashes.    Neurologic:  Non-focal exam without asymmetric weakness or numbness.     Psychiatric:  Normal affect with appropriate interaction with examiner.      Diagnostics     Lab Results   Labs Ordered and Resulted from Time of ED Arrival to Time of ED Departure   COMPREHENSIVE METABOLIC PANEL - Abnormal       Result Value    Sodium 134 (*)     Potassium 4.1      Carbon Dioxide (CO2) 23      Anion Gap 11      Urea Nitrogen 13.3      Creatinine 0.57 (*)     GFR Estimate >90      Calcium 8.4 (*)     Chloride 100      Glucose 187 (*)     Alkaline Phosphatase 352 (*)     AST 76 (*)     ALT 10      Protein Total 6.7      Albumin 3.2 (*)     Bilirubin Total 0.3     CBC WITH PLATELETS AND DIFFERENTIAL - Abnormal    WBC Count 6.1      RBC  Count 3.23 (*)     Hemoglobin 9.3 (*)     Hematocrit 29.4 (*)     MCV 91      MCH 28.8      MCHC 31.6      RDW 15.3 (*)     Platelet Count 145 (*)     % Neutrophils 76      % Lymphocytes 12      % Monocytes 7      % Eosinophils 0      % Basophils 1      % Immature Granulocytes 4      NRBCs per 100 WBC 0      Absolute Neutrophils 4.6      Absolute Lymphocytes 0.7 (*)     Absolute Monocytes 0.4      Absolute Eosinophils 0.0      Absolute Basophils 0.0      Absolute Immature Granulocytes 0.2      Absolute NRBCs 0.0     CK TOTAL - Abnormal     (*)    HEMOGLOBIN A1C - Abnormal    Hemoglobin A1C 7.6 (*)    TROPONIN T, HIGH SENSITIVITY - Normal    Troponin T, High Sensitivity 17     MAGNESIUM - Normal    Magnesium 1.8     PHOSPHORUS - Normal    Phosphorus 2.8     TSH WITH FREE T4 REFLEX - Normal    TSH 0.65     ROUTINE UA WITH MICROSCOPIC REFLEX TO CULTURE       Imaging   Head CT w/o contrast   Final Result   IMPRESSION:      1. No acute intracranial hemorrhage, mass effect, or herniation.   2. Mottled appearance of the left parietal bone concerning for osseous   metastases. This is more conspicuous since 9/3/2023.         CALEB URENA MD            SYSTEM ID:  [id number]      CT Chest Pulmonary Embolism w Contrast   Final Result   IMPRESSION:   1.  Possible mild bronchitis.    2.  No pulmonary embolus.   3.  Interval enlargement of mediastinal and hilar lymphadenopathy.   4.  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      SEBLE CALLAHAN MD            SYSTEM ID:  [id number]          EKG   ECG results from 09/05/24   EKG 12-lead, tracing only     Value    Systolic Blood Pressure     Diastolic Blood Pressure     Ventricular Rate 98    Atrial Rate 98    OH Interval 144    QRS Duration 96        QTc 487    P Axis 61    R AXIS 82    T Axis 68    Interpretation ECG      Sinus rhythm with Premature atrial complexes  Low voltage QRS  RSR' or QR pattern in V1 suggests right ventricular conduction  delay  Prolonged QT  Abnormal ECG  When compared with ECG of 03-Sep-2023 18:07,  RSR' pattern in V1 has replaced Right bundle branch block  Confirmed by GENERATED REPORT, COMPUTER ([id number]),  LAQUITA CALLAHAN ([id number]) on 9/5/2024 12:22:12 PM           Independent Interpretation   CT Head: No intracranial hemorrhage.    ED Course      Medications Administered   Medications   sodium chloride 0.9% BOLUS 500 mL (500 mLs Intravenous $New Bag 9/5/24 [id number])       Procedures   Procedures     Discussion of Management   Admitting Hospitalist, Chris Rahman for Domi Taylor MD    ED Course        Additional Documentation  None    Medical Decision Making / Diagnosis     CMS Diagnoses: None    MIPS       CT for PE was ordered because the patient is high risk for pulmonary embolism.    KWAN Beach is a 78 year old male presenting to us with progressive weakness, now with a fall with inability to get up along with intermittent atrial fibrillation and hypotension.  With his fall and possibly striking his head, head CT was obtained.  With his new tachycardia and hypotension with prolonged immobility and history of cancer, CT of the chest was obtained which is negative for PE.  Remainder of his labs are reassuring and his blood pressure improved significantly with hydration.  He appears dry on exam and I believe he requires further resuscitation.  Furthermore, he is too weak to return to his current facility and will likely require rehab placement.  Therefore, he will be admitted to the hospital service under observation status.  Patient's questions were answered he is comfortable with the plan for admission.    Disposition   The patient was admitted to the hospital.     Diagnosis     ICD-10-CM    1. Generalized weakness  R53.1       2. New onset atrial fibrillation (H)  I48.91       3. Hypotension, unspecified hypotension type  I95.9     IMPROVED      4. Prostate cancer (H)  C61            Discharge Medications   New  Prescriptions    No medications on file         Chad A. Trierweiler, MD Trierweiler, Chad A, MD  09/05/24 [id number]

## 2024-09-06 NOTE — PROGRESS NOTES
[facility]  OUTPATIENT PHYSICAL THERAPY EVALUATION  PLAN OF TREATMENT FOR OUTPATIENT REHABILITATION  (COMPLETE FOR INITIAL CLAIMS ONLY)  Patient's Last Name, First Name, M.I.  YOB: 1945  Migue Beach                        Provider's Name  [facility] Medical Record No.  [id number]                             Onset Date:  09/05/24   Start of Care Date:  09/06/24   Type:     _X_PT   ___OT   ___SLP Medical Diagnosis:  prostate cancer, gen weakness, falls              PT Diagnosis:  impaired IND with functional mobility Visits from SOC:  1     See note for plan of treatment, functional goals and certification details    I CERTIFY THE NEED FOR THESE SERVICES FURNISHED UNDER        THIS PLAN OF TREATMENT AND WHILE UNDER MY CARE     (Physician co-signature of this document indicates review and certification of the therapy plan).              09/06/24 1400   Appointment Info   Signing Clinician's Name / Credentials (PT) Lise Mercado DPT   Quick Adds   Quick Adds Certification   Living Environment   People in Home spouse   Current Living Arrangements assisted living   Home Accessibility no concerns   Self-Care   Usual Activity Tolerance fair   Current Activity Tolerance poor   Equipment Currently Used at Home walker, rolling;cane, straight;wheelchair, manual;shower chair;grab bar, toilet;grab bar, tub/shower   Fall history within last six months yes   Number of times patient has fallen within last six months 25   Activity/Exercise/Self-Care Comment Pt reports he can ambulate with FWW or SEC, but also uses self-propels manual WC in his apt. Spouse reports pt has been needing increased assist for ADLs leading up to admission, had been needing to call aides for help ambulating to BR.   General Information   Onset of Illness/Injury or Date of Surgery 09/05/24   Referring Physician Tan Gu PA-C   Pertinent History of Current  "Problem (include personal factors and/or comorbidities that impact the POC) \"Migue Beach is a 78 year old male registered to short-stay/observation on 9/5/2024 due to fall, new onset atrial fib, hypotension.       Past medical history significant for HTN, HLP, DM2, MDD with anxiety, Insomnia, Metastatic prostate cancer, Chronic pain, Cognitive impairment, Chronic anemia, Slow transit constipation.     Patient presented to the ED via ambulance from home.  Patient's wife reported that the patient has been dizzy and has had several falls over the past few days.  EMS reported that the patient was hypotensive and was in atrial fib.       Work-up in the ED included an EKG that revealed sinus rhythm with PACs, RSR' and prolonged QTc.  CMP revealed a sodium of 134, creatinine of 0.57 with a GFR greater than 90, calcium of 8.4, albumin of 3.2, alkaline phosphatase of 352, AST of 76, glucose of 187 otherwise unremarkable.  CBC with differential revealed a hemoglobin of 9.3, hematocrit of 29.4, platelet count of 145, RBC count of 3.23, RDW of 15.3, absolute lymphocytes of 0.7 otherwise unremarkable.  High-sensitivity troponin T was within normal limits at 17.  Total CK was elevated at 741.     Patient received IV fluid bolus 500 cc while in the ED.       Per report from Dr. Trierweiler, patient had a fall the night prior to presentation and remained on the floor for ~ 3 hours.  Family was unable to get the patient up off the floor and led to EMS being contacted.  Upon assessment by EMS patient was hypotensive (80/50) and in atrial fib with RVR (HR in the 150's).  En route to [facility] patient spontaneously converted back to normal sinus rhythm.  While in the ED,  patient's family reported that the patient has not been eating, drink fluids and has been falling frequently the past few days.    \"   Existing Precautions/Restrictions fall   Cognition   Affect/Mental Status (Cognition) confused;low arousal/lethargic "   Orientation Status (Cognition) oriented to;person   Follows Commands (Cognition) follows one-step commands;follows two-step commands;repetition of directions required   Cognitive Status Comments very sleepy, needs increased cuing to stay alert throughout session   Pain Assessment   Patient Currently in Pain No   Posture    Posture Forward head position;Protracted shoulders   Range of Motion (ROM)   Range of Motion ROM is WFL   Strength (Manual Muscle Testing)   Strength Comments decreased functional LE strength   Bed Mobility   Comment, (Bed Mobility) maxA sup>sit   Transfers   Comment, (Transfers) modA sit>stand to FWW   Gait/Stairs (Locomotion)   Comment, (Gait/Stairs) NT due to safety concerns with orthostatic BP   Balance   Balance Comments high falls risk, currently requires assist for safety with all mobility   Sensory Examination   Sensory Perception patient reports no sensory changes   Clinical Impression   Criteria for Skilled Therapeutic Intervention Yes, treatment indicated   PT Diagnosis (PT) impaired IND with functional mobility   Influenced by the following impairments impaired functional strength, balance, activity tolerance   Functional limitations due to impairments impaired bed mobility, transfers, ambulation   Clinical Presentation (PT Evaluation Complexity) stable   Clinical Presentation Rationale Based on current presentation, PMH, social support   Clinical Decision Making (Complexity) low complexity   Planned Therapy Interventions (PT) balance training;bed mobility training;gait training;home exercise program;patient/family education;strengthening;transfer training;progressive activity/exercise;home program guidelines   Risk & Benefits of therapy have been explained evaluation/treatment results reviewed;care plan/treatment goals reviewed;risks/benefits reviewed;current/potential barriers reviewed;participants voiced agreement with care plan;participants included;patient;spouse/significant  other   PT Total Evaluation Time   PT Eval, Low Complexity Minutes (92801) 10   Therapy Certification   Start of care date 09/06/24   Certification date from 09/06/24   Certification date to 09/11/24   Medical Diagnosis prostate cancer, gen weakness, falls   Physical Therapy Goals   PT Frequency 5x/week   PT Predicted Duration/Target Date for Goal Attainment 09/13/24   PT Goals Bed Mobility;Transfers;Gait   PT: Bed Mobility Minimal assist;Supine to/from sit   PT: Transfers Minimal assist;Sit to/from stand;Bed to/from chair;Assistive device   PT: Gait Minimal assist;Assistive device;Rolling walker;50 feet   Interventions   Interventions Quick Adds Gait Training;Therapeutic Activity   Therapeutic Activity   Therapeutic Activities: dynamic activities to improve functional performance Minutes (33551) 18   Symptoms Noted During/After Treatment Fatigue;Significant change in vital signs   Treatment Detail/Skilled Intervention Increased time spent to gather equipment and prepare room for safe OOB mobility. ModA to don gown prior to initiation of mobility. Time spent to monitor orthostatic BPs throughout mobility. Sup>sit with modA, pt reporting increased pain in back with this transition. Pt cued to scoot to EOB, requires modA for improved positioning. Continued posterior lean in sitting despite cues. CGA-Ayan needed to maintain upright posture despite cues. Unable to obtain BP reading in standing due to instability and poor activity tolerance. BP checked on return to sitting and pt found to be positive for orthostatic hypotension (107/57 in supine, to 84/54 after standing - further details in VSFS). Pt denies any dizziness but reports fatigued with activity. Sit>sup with maxA. MaxA needed to boost up in bed. Pt remained supine with alarm armed and needs in reach, discussed with RN at PT exit.   PT Discharge Planning   PT Plan progress bed mobility, sit<>Stand reps, trial gait with Ax2, monitor BPs   PT Discharge  Recommendation (DC Rec) Transitional Care Facility   PT Rationale for DC Rec Pt currently below baseline and presents after frequent falls and progressive weakness at home. Today, requires maxA for bed mobility and modA for transfers, unable to safely ambulate. Rec TCU to maximize safety and IND with mobility prior to returning home.   PT Brief overview of current status Ax2 FWW, pivot only. Goals of therapy will be to address safe mobility and make recs for d/c to next level of care. Pt and RN will continue to follow all falls risk precautions as documented by RN staff while hospitalized.   Total Session Time   Timed Code Treatment Minutes 18   Total Session Time (sum of timed and untimed services) 28

## 2024-09-06 NOTE — SIGNIFICANT EVENT
Significant Event Note    Time of event: 1:52 AM September 6, 2024    Description of event:  Paged for confusion, pulling his gown and lines.    Plan:  A low dose of Seroquel has carefully been ordered for safety and relief of symptoms.    Discussed with: bedside nurse    Baron De La Cruz MD

## 2024-09-06 NOTE — CONSULTS
Consultation    Migue Beach MRN# [id number]   YOB: 1945 Age: 78 year old   Date of Admission: 9/5/2024  Requesting physician: Dr. Taylor  Reason for consult: Prostate cancer           Assessment and Plan:     Arash is an 78 year old with metastatic prostate cancer admitted with progressive weakness    Metastatic prostate cancer  - Below is from a recent office note by Dr. Damon on 8/26/2024  - The patient has metastatic castration resistant prostate cancer, with lymph node and bone metastases.    - Completed 10 cycles of Taxotere/prednisone in June 2024.  - He remains on leuprolide, next 1/2025  - PSA doubled in a month after finishing Taxotere.  Now with worsening low back pain likely due to disease progression  - Disease is positive of PSMA PET scan  - Dr. Damon discussed Pluvicto versus cabazitaxel versus hospice/comfort care.  Patient is interested in trying Pluvicto.  Discussed efficacy and potential side effects  - He will need to get a little stronger before we can try additional therapy even if this means he needs to go to TCU  - He has been referred to Radiation Oncology to discuss Pluvicto. This is not something that would be initiated as an inpatient, therefore it can be deferred until after discharge    Weakness  Falls  - Likely a combination of progressive disease and treatment, but also poor intake  - Work with PT/OT  - Work on nutrition and hydration  - Continue to explore goals of care, but right now he'd like to consider more treatment    Gary FLORES, CNP  [facility]  [phone] (office)              Chief Complaint:   Hypotension, A-fib, and Dizziness         History of Present Illness:   Arash is an 78 year old with metastatic prostate cancer admitted with progressive weakness    Arash was admitted due to progressive weakness and falling at home. He wants to have more treatment for prostate cancer. The next consideration is Pluvicto.     Oncology history  Mr. Camarena  (goes by Arash) Yong is an 78-year-old gentleman with metastatic castration resistant prostate cancer  ?  He was initially referred to Urology due to elevated PSA of 10.8 on 04/18/2016. Subsequent prostate biopsy on 05/26/2016 unfortunately showed Zaleski 5+4=9 prostate cancer in 5/13 cores up to 75%. Bone scan and CT were negative for mets at that time.    Accordingly, after discussion of management options, Mr. Beach chose radiation and completed external beam radiation therapy/EBRT at [facility] from July to September of 2016. Androgen deprivation therapy (ADT) with a GnRH analog was started June 2016 to May 2018. Lupron was tolerated well without any side effects.  His PSA was 0.35 in May 2018.    His PSA unfortunately did increase to 36.1 on 11/20/2018 after being off of ADT for 1 year (he was having hot flashes and other symptoms he felt were intolerable at the time). Of note radiographic imaging on 11/26/2018, unfortunately showed new metastatic disease to periaortic and left internal iliac nodes.  Bone scan at the time was negative.    Thus, Casodex (bicalutamide) was started at that time.  He was restarted on GnRH analog therapy with Eligard 6 month injections as of December 4, 2018.  PSA became undetectable.    PSA became detectable 9/2021.      PSA up to 1.1 by 9/15/2022.   PSMA PET scan showed widely metastatic disease, but the patient remains asymptomatic.    Could not get financial assistance for Zytiga or Xtandi.  Started Erleada late November 2021.     PSA further increased from 2.2-5.0 by 3/2023.  CT CAP and bone scan also showed disease progression.  Fortunately, the patient was still asymptomatic from his disease.    Got Zytiga approved, and patient started Zytiga/prednisone 3/24/2023    Unfortunately, his PSA went up significantly from 7.6-21 between March and August 2023.  Bone scan and CT scan of the abdomen and pelvis showed disease progression in the bones and pelvic lymph  nodes.    (2023-2024) Taxotere and prednisone.  Cycle 1 of Taxotere 60 mg/m2 every 21 days on 2023.  Received 2 cycles, and then treatment was interrupted for 2 1/2 months.  Restarted 2023 at 50 mg/m2.  Completed 10 total cycles.    PSA stable during treatment, but went up from 63 to 138 a month after finishing chemotherapy         Physical Exam:   Vitals were reviewed  Blood pressure 131/67, pulse 104, temperature 99  F (37.2  C), temperature source Oral, resp. rate 18, SpO2 97%.  Temperatures:  Current - Temp: 99  F (37.2  C); Max - Temp  Av.1  F (36.7  C)  Min: 97  F (36.1  C)  Max: 99  F (37.2  C)  Respiration range: Resp  Av.3  Min: 11  Max: 25  Pulse range: Pulse  Av.3  Min: 88  Max: 104  Blood pressure range: Systolic (24hrs), Av , Min:93 , Max:138   ; Diastolic (24hrs), Av, Min:53, Max:78    Pulse oximetry range: SpO2  Av.1 %  Min: 94 %  Max: 99 %  No intake or output data in the 24 hours ending 24 1036    GENERAL: No acute distress.  Remainder of exam deferred for sake of discussion            Past Medical History:   I have reviewed this patient's past medical history  Past Medical History:   Diagnosis Date    Depressive disorder     anxiety    Hypertension              Past Surgical History:   I have reviewed this patient's past surgical history  Past Surgical History:   Procedure Laterality Date    COLONOSCOPY                 Social History:   I have reviewed this patient's social history  Social History     Tobacco Use    Smoking status: Former    Smokeless tobacco: Not on file   Substance Use Topics    Alcohol use: Yes     Comment: 5- weekend             Family History:   I have reviewed this patient's family history  No family history on file.          Allergies:   No Known Allergies          Medications:   I have reviewed this patient's current medications  Medications Prior to Admission   Medication Sig Dispense Refill Last Dose    acetaminophen  (TYLENOL) 500 MG tablet Take 1,000 mg by mouth 3 times daily   9/4/2024 at 1908    diphenhydrAMINE-zinc acetate (BENADRYL) 1-0.1 % external cream Apply topically 3 times daily as needed for itching   Unknown    DULoxetine (CYMBALTA) 20 MG capsule Take 20 mg by mouth 2 times daily   9/4/2024 at 1908    glucose 40 % (400 mg/mL) gel Take 15-30 g by mouth every 15 minutes as needed for low blood sugar (for blood sugar <60 mg/dl)   Unknown    LORazepam (ATIVAN) 0.5 MG tablet Take 0.5 mg by mouth 2 times daily as needed for anxiety. At least 6 hours after scheduled dose   5/5/2024 at 2052    LORazepam (ATIVAN) 0.5 MG tablet Take 0.5 mg by mouth 2 times daily.   9/4/2024 at 1908    magnesium oxide (MAG-OX) 400 MG tablet Take 1 tablet (400 mg) by mouth daily   9/4/2024 at 0822    melatonin 1 MG TABS tablet Take 3 tablets (3 mg) by mouth nightly as needed for sleep   Unknown    metFORMIN (GLUCOPHAGE) 500 MG tablet Take 500 mg by mouth 2 times daily (with meals).   9/4/2024 at 1908    morphine (MS CONTIN) 15 MG CR tablet Take 15 mg by mouth daily as needed for severe pain.   8/28/2024 at 0505    morphine (MS CONTIN) 15 MG CR tablet Take 15 mg by mouth 3 times daily.   9/4/2024 at 1908    naloxone (NARCAN) 4 MG/0.1ML nasal spray Spray 1 spray (4 mg) into one nostril alternating nostrils as needed for opioid reversal every 2-3 minutes until assistance arrives 0.2 mL 0 5/4/2024    ondansetron (ZOFRAN ODT) 4 MG ODT tab Take 1 tablet (4 mg) by mouth every 6 hours as needed for nausea or vomiting   9/5/2024 at 2051    oxyCODONE IR (ROXICODONE) 10 MG tablet Take 10 mg by mouth every 4 hours as needed for severe pain (pain greater than 5/10 on numerical pain scale).   9/5/2024 at 0626    predniSONE (DELTASONE) 5 MG tablet Take 5 mg by mouth 2 times daily.   9/4/2024 at 1908    senna-docusate (SENOKOT-S/PERICOLACE) 8.6-50 MG tablet Take 2 tablets by mouth daily.   9/4/2024 at 0822    senna-docusate (SENOKOT-S/PERICOLACE) 8.6-50 MG  tablet Take 1 tablet by mouth 2 times daily as needed for constipation   Unknown    sertraline (ZOLOFT) 100 MG tablet Take 100 mg by mouth every morning.   9/4/2024 at 0822     Current Facility-Administered Medications   Medication Dose Route Frequency Provider Last Rate Last Admin    acetaminophen (TYLENOL) tablet 650 mg  650 mg Oral Q4H PRN Tan Gu PA-C        Or    acetaminophen (TYLENOL) Suppository 650 mg  650 mg Rectal Q4H PRN Tan Gu PA-C        acetaminophen (TYLENOL) tablet 1,000 mg  1,000 mg Oral TID Tan Gu PA-C   1,000 mg at 09/06/24 0917    bisacodyl (DULCOLAX) suppository 10 mg  10 mg Rectal Daily PRN Tan Gu PA-C        glucose gel 15-30 g  15-30 g Oral Q15 Min PRN Tan Gu PA-C        Or    dextrose 50 % injection 25-50 mL  25-50 mL Intravenous Q15 Min PRN Tan Gu PA-C        Or    glucagon injection 1 mg  1 mg Subcutaneous Q15 Min PRN Tan Gu PA-C        DULoxetine (CYMBALTA) DR capsule 20 mg  20 mg Oral BID Tan Gu PA-C   20 mg at 09/06/24 0916    HYDROmorphone (DILAUDID) injection 0.2 mg  0.2 mg Intravenous Q2H PRN Tan Gu PA-C        HYDROmorphone (DILAUDID) injection 0.4 mg  0.4 mg Intravenous Q2H PRN Tan Gu PA-C        insulin aspart (NovoLOG) injection (RAPID ACTING)  1-7 Units Subcutaneous TID AC Tan Gu PA-C        insulin aspart (NovoLOG) injection (RAPID ACTING)  1-5 Units Subcutaneous At Bedtime Tan Gu PA-C        lidocaine (LMX4) cream   Topical Q1H PRN Tan Gu PA-C        lidocaine 1 % 0.1-1 mL  0.1-1 mL Other Q1H PRN Tan Gu PA-C        LORazepam (ATIVAN) tablet 0.5 mg  0.5 mg Oral BID PRN Riccardo, Tan Ella, PA-C        LORazepam (ATIVAN) tablet 0.5 mg  0.5 mg Oral BID Tan Gu PA-C   0.5 mg at 09/06/24 0905     melatonin tablet 3 mg  3 mg Oral At Bedtime Tan Gu PA-C   3 mg at 09/06/24 0052    morphine (MS CONTIN) 12 hr tablet 15 mg  15 mg Oral Daily PRN Tan Gu PA-C        morphine (MS CONTIN) 12 hr tablet 15 mg  15 mg Oral TID Tan Gu PA-C   15 mg at 09/06/24 0917    naloxone (NARCAN) injection 0.2 mg  0.2 mg Intravenous Q2 Min PRN Tan Gu PA-C        Or    naloxone (NARCAN) injection 0.4 mg  0.4 mg Intravenous Q2 Min PRN Tan Gu PA-C        Or    naloxone (NARCAN) injection 0.2 mg  0.2 mg Intramuscular Q2 Min PRN Tan Gu PA-C        Or    naloxone (NARCAN) injection 0.4 mg  0.4 mg Intramuscular Q2 Min PRN Tan uG PA-C        oxyCODONE (ROXICODONE) tablet 10 mg  10 mg Oral Q4H PRN Tan Gu PA-C        polyethylene glycol (MIRALAX) Packet 17 g  17 g Oral BID PRN Tan Gu PA-C        polyethylene glycol (MIRALAX) Packet 17 g  17 g Oral Daily Tan Gu PA-C        predniSONE (DELTASONE) tablet 5 mg  5 mg Oral BID Tan Gu PA-C   5 mg at 09/06/24 0916    prochlorperazine (COMPAZINE) injection 5 mg  5 mg Intravenous Q6H PRN Tan Gu PA-C        Or    prochlorperazine (COMPAZINE) tablet 5 mg  5 mg Oral Q6H PRN Tan Gu PA-C        Or    prochlorperazine (COMPAZINE) suppository 12.5 mg  12.5 mg Rectal Q12H PRN Tan Gu PA-C        QUEtiapine (SEROquel) half-tab 12.5 mg  12.5 mg Oral Once Baron De La Cruz MD        senna-docusate (SENOKOT-S/PERICOLACE) 8.6-50 MG per tablet 1 tablet  1 tablet Oral BID PRN Tan Gu PA-C        Or    senna-docusate (SENOKOT-S/PERICOLACE) 8.6-50 MG per tablet 2 tablet  2 tablet Oral BID PRN Tan Gu PA-C        senna-docusate (SENOKOT-S/PERICOLACE) 8.6-50 MG per tablet 2 tablet  2 tablet Oral Daily Tan Gu  RANDALL Jaramillo   2 tablet at 09/06/24 0916    sertraline (ZOLOFT) tablet 100 mg  100 mg Oral QAM Tan Gu PA-C   100 mg at 09/06/24 0916    sodium chloride (PF) 0.9% PF flush 3 mL  3 mL Intracatheter q1 min prn Tan Gu PA-ANTOLIN        sodium chloride (PF) 0.9% PF flush 3 mL  3 mL Intracatheter Q8H Tan Gu PA-C   3 mL at 09/05/24 2049    sodium chloride 0.9 % infusion   Intravenous Continuous Tan Gu PA-C 100 mL/hr at 09/06/24 1030 Restarted at 09/06/24 1030             Review of Systems:     The 10 point Review of Systems is negative other than noted in the HPI.            Data:   Data   Results for orders placed or performed during the hospital encounter of 09/05/24 (from the past 24 hour(s))   EKG 12-lead, tracing only   Result Value Ref Range    Systolic Blood Pressure  mmHg    Diastolic Blood Pressure  mmHg    Ventricular Rate 98 BPM    Atrial Rate 98 BPM    IN Interval 144 ms    QRS Duration 96 ms     ms    QTc 487 ms    P Axis 61 degrees    R AXIS 82 degrees    T Axis 68 degrees    Interpretation ECG       Sinus rhythm with Premature atrial complexes  Low voltage QRS  RSR' or QR pattern in V1 suggests right ventricular conduction delay  Prolonged QT  Abnormal ECG  When compared with ECG of 03-Sep-2023 18:07,  RSR' pattern in V1 has replaced Right bundle branch block  Confirmed by GENERATED REPORT, COMPUTER ([id number]),  LAQUITA CALLAHAN ([id number]) on 9/5/2024 12:22:12 PM     CBC with platelets + differential    Narrative    The following orders were created for panel order CBC with platelets + differential.  Procedure                               Abnormality         Status                     ---------                               -----------         ------                     CBC with platelets and d...[[id number]]  Abnormal            Final result                 Please view results for these tests on the individual orders.   Comprehensive  metabolic panel   Result Value Ref Range    Sodium 134 (L) 135 - 145 mmol/L    Potassium 4.1 3.4 - 5.3 mmol/L    Carbon Dioxide (CO2) 23 22 - 29 mmol/L    Anion Gap 11 7 - 15 mmol/L    Urea Nitrogen 13.3 8.0 - 23.0 mg/dL    Creatinine 0.57 (L) 0.67 - 1.17 mg/dL    GFR Estimate >90 >60 mL/min/1.73m2    Calcium 8.4 (L) 8.8 - 10.4 mg/dL    Chloride 100 98 - 107 mmol/L    Glucose 187 (H) 70 - 99 mg/dL    Alkaline Phosphatase 352 (H) 40 - 150 U/L    AST 76 (H) 0 - 45 U/L    ALT 10 0 - 70 U/L    Protein Total 6.7 6.4 - 8.3 g/dL    Albumin 3.2 (L) 3.5 - 5.2 g/dL    Bilirubin Total 0.3 <=1.2 mg/dL   Troponin T, High Sensitivity   Result Value Ref Range    Troponin T, High Sensitivity 17 <=22 ng/L   [place] Draw    Narrative    The following orders were created for panel order [place] Draw.  Procedure                               Abnormality         Status                     ---------                               -----------         ------                     Extra Blue Top Tube[[id number]]                              Final result                 Please view results for these tests on the individual orders.   CBC with platelets and differential   Result Value Ref Range    WBC Count 6.1 4.0 - 11.0 10e3/uL    RBC Count 3.23 (L) 4.40 - 5.90 10e6/uL    Hemoglobin 9.3 (L) 13.3 - 17.7 g/dL    Hematocrit 29.4 (L) 40.0 - 53.0 %    MCV 91 78 - 100 fL    MCH 28.8 26.5 - 33.0 pg    MCHC 31.6 31.5 - 36.5 g/dL    RDW 15.3 (H) 10.0 - 15.0 %    Platelet Count 145 (L) 150 - 450 10e3/uL    % Neutrophils 76 %    % Lymphocytes 12 %    % Monocytes 7 %    % Eosinophils 0 %    % Basophils 1 %    % Immature Granulocytes 4 %    NRBCs per 100 WBC 0 <1 /100    Absolute Neutrophils 4.6 1.6 - 8.3 10e3/uL    Absolute Lymphocytes 0.7 (L) 0.8 - 5.3 10e3/uL    Absolute Monocytes 0.4 0.0 - 1.3 10e3/uL    Absolute Eosinophils 0.0 0.0 - 0.7 10e3/uL    Absolute Basophils 0.0 0.0 - 0.2 10e3/uL    Absolute Immature Granulocytes 0.2 <=0.4 10e3/uL    Absolute  NRBCs 0.0 10e3/uL   Extra Blue Top Tube   Result Value Ref Range    Hold Specimen JIC    CK total   Result Value Ref Range     (H) 39 - 308 U/L   Magnesium   Result Value Ref Range    Magnesium 1.8 1.7 - 2.3 mg/dL   Phosphorus   Result Value Ref Range    Phosphorus 2.8 2.5 - 4.5 mg/dL   TSH with free T4 reflex   Result Value Ref Range    TSH 0.65 0.30 - 4.20 uIU/mL   Hemoglobin A1c   Result Value Ref Range    Hemoglobin A1C 7.6 (H) <5.7 %   CT Chest Pulmonary Embolism w Contrast    Narrative    CT CHEST PULMONARY EMBOLISM W CONTRAST 9/5/2024 2:49 PM    CLINICAL HISTORY: tachycardia, immobility  TECHNIQUE: CT angiogram chest during arterial phase injection IV  contrast. 2D and 3D MIP reconstructions were performed by the CT  technologist. Dose reduction techniques were used.     CONTRAST: 66 cc [id number]    COMPARISON: Chest CT 3/28/2024    FINDINGS:  ANGIOGRAM CHEST: Pulmonary arteries are normal caliber and negative  for pulmonary emboli. Thoracic aorta is not well opacified and is  indeterminate for dissection. No CT evidence of right heart strain.    LUNGS AND PLEURA: No focal airspace consolidation or pleural effusion.  There is some scattered bronchial wall thickening and mucous plugging.  Scattered small pulmonary nodules are stable from prior exam. No  definite new or enlarging nodules.    MEDIASTINUM/AXILLAE: Interval enlargement of mediastinal and hilar  lymphadenopathy with representative right hilar lymph node measuring  measuring 2.9 cm in short axis, previously 1.7 cm (series 5 image 117)  and subcarinal node measuring 2.4 cm in short axis, previously 1.4 cm   (series 5 image 141).    CORONARY ARTERY CALCIFICATION: Severe.    UPPER ABDOMEN: Unremarkable.    MUSCULOSKELETAL: Mixed lytic and sclerotic osseous lesions are again  noted. No definite acute bony abnormality.      Impression    IMPRESSION:  1.  Possible mild bronchitis.   2.  No pulmonary embolus.  3.  Interval enlargement of mediastinal  and hilar lymphadenopathy.  4.  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    SEBLE CALLAHAN MD         SYSTEM ID:  [id number]   Head CT w/o contrast    Narrative    CT SCAN OF THE HEAD WITHOUT CONTRAST September 5, 2024 2:52 PM     HISTORY: Closed head injury.    TECHNIQUE: Axial images of the head and coronal reformations without  IV contrast material. Radiation dose for this scan was reduced using  automated exposure control, adjustment of the mA and/or kV according  to patient size, or iterative reconstruction technique.    COMPARISON: Brain MR 9/3/2023. Head CT 9/3/2023.    FINDINGS: No acute intracranial hemorrhage. No mass effect or midline  shift. Mild diffuse parenchymal volume loss. Ventricular size is  within normal limits without evidence of hydrocephalus. Gray-white  matter differentiation appears intact.    Mild scattered mucosal thickening in the paranasal sinuses. Mild  mottled and lytic appearance of the left parietal bone concerning for  bony metastases. This appears more conspicuous since 3/3/2023.      Impression    IMPRESSION:     1. No acute intracranial hemorrhage, mass effect, or herniation.  2. Mottled appearance of the left parietal bone concerning for osseous  metastases. This is more conspicuous since 9/3/2023.      CALEB URENA MD         SYSTEM ID:  [id number]   UA with Microscopic reflex to Culture    Specimen: Urine, Midstream   Result Value Ref Range    Color Urine Yellow Colorless, Straw, Light Yellow, Yellow    Appearance Urine Clear Clear    Glucose Urine 150 (A) Negative mg/dL    Bilirubin Urine Negative Negative    Ketones Urine Trace (A) Negative mg/dL    Specific Gravity Urine 1.021 1.003 - 1.035    Blood Urine Negative Negative    pH Urine 5.5 5.0 - 7.0    Protein Albumin Urine 10 (A) Negative mg/dL    Urobilinogen Urine Normal Normal, 2.0 mg/dL    Nitrite Urine Negative Negative    Leukocyte Esterase Urine Negative Negative    Mucus Urine Present (A) None  Seen /LPF    RBC Urine <1 <=2 /HPF    WBC Urine 0 <=5 /HPF    Hyaline Casts Urine 4 (H) <=2 /LPF    Narrative    Urine Culture not indicated   Glucose by meter   Result Value Ref Range    GLUCOSE BY METER POCT 149 (H) 70 - 99 mg/dL   Glucose by meter   Result Value Ref Range    GLUCOSE BY METER POCT 191 (H) 70 - 99 mg/dL   Glucose by meter   Result Value Ref Range    GLUCOSE BY METER POCT 180 (H) 70 - 99 mg/dL   Basic metabolic panel   Result Value Ref Range    Sodium 134 (L) 135 - 145 mmol/L    Potassium 3.7 3.4 - 5.3 mmol/L    Chloride 101 98 - 107 mmol/L    Carbon Dioxide (CO2) 22 22 - 29 mmol/L    Anion Gap 11 7 - 15 mmol/L    Urea Nitrogen 7.3 (L) 8.0 - 23.0 mg/dL    Creatinine 0.41 (L) 0.67 - 1.17 mg/dL    GFR Estimate >90 >60 mL/min/1.73m2    Calcium 8.2 (L) 8.8 - 10.4 mg/dL    Glucose 157 (H) 70 - 99 mg/dL   CBC with platelets   Result Value Ref Range    WBC Count 4.9 4.0 - 11.0 10e3/uL    RBC Count 3.28 (L) 4.40 - 5.90 10e6/uL    Hemoglobin 9.4 (L) 13.3 - 17.7 g/dL    Hematocrit 29.7 (L) 40.0 - 53.0 %    MCV 91 78 - 100 fL    MCH 28.7 26.5 - 33.0 pg    MCHC 31.6 31.5 - 36.5 g/dL    RDW 15.4 (H) 10.0 - 15.0 %    Platelet Count 157 150 - 450 10e3/uL   Hepatic panel   Result Value Ref Range    Protein Total 6.5 6.4 - 8.3 g/dL    Albumin 2.9 (L) 3.5 - 5.2 g/dL    Bilirubin Total 0.2 <=1.2 mg/dL    Alkaline Phosphatase 433 (H) 40 - 150 U/L     (H) 0 - 45 U/L    ALT 11 0 - 70 U/L    Bilirubin Direct <0.20 0.00 - 0.30 mg/dL   CK total   Result Value Ref Range    CK 1,198 (HH) 39 - 308 U/L   Glucose by meter   Result Value Ref Range    GLUCOSE BY METER POCT 153 (H) 70 - 99 mg/dL   Echocardiogram Complete   Result Value Ref Range    LVEF  55-60%     Narrative    [id number]  [id number]  [id number]^ABIGAIL^VINICIUS^EARL     [facility]  Echocardiography Laboratory  [address] [place] [address]     Name: USAMA SAL  MRN: [id number]  : 1945  Study Date: 2024  08:04 AM  Age: 78 yrs  Gender: Male  Patient Location: [facility]  Reason For Study: Atrial Fibrillation  Ordering Physician: VINICIUS HAYES  Referring Physician: Laz Lewis MD  Performed By: Kaitlynn Barrera RDCS     BSA: 1.9 m2  Height: 67 in  Weight: 173 lb  HR: 100  BP: 138/72 mmHg  ______________________________________________________________________________  Procedure  Complete Portable Echo Adult. Optison (NDC #[id number]) given intravenously.  Complete Echo Adult.  ______________________________________________________________________________  Interpretation Summary     The visual ejection fraction is 55-60%.  No regional wall motion abnormalities noted.  Normal left atrial size.  No significant valvular heart disease.  Technically challenging study reduces sensitivity and specificity of findings.  ______________________________________________________________________________  Left Ventricle  The left ventricle is normal in size. There is normal left ventricular wall  thickness. Diastolic Doppler findings (E/E' ratio and/or other parameters)  suggest left ventricular filling pressures are indeterminate. The visual  ejection fraction is 55-60%. No regional wall motion abnormalities noted.     Right Ventricle  The right ventricle is normal in size and function.     Atria  Normal left atrial size. Right atrial size is normal. There is no color  Doppler evidence of an atrial shunt.     Mitral Valve  The mitral valve leaflets are mildly thickened. There is trace mitral  regurgitation.     Tricuspid Valve  Tricuspid leaflets are thickened. There is trace tricuspid regurgitation. IVC  diameter and respiratory changes fall into an intermediate range suggesting an  RA pressure of 8 mmHg.     Aortic Valve  There is trivial trileaflet aortic sclerosis. No aortic regurgitation is  present.     Pulmonic Valve  There is trace pulmonic valvular regurgitation.     Vessels  Normal size aorta. The aortic root is  normal size.     Pericardium  There is no pericardial effusion.     Rhythm  Sinus rhythm was noted.  ______________________________________________________________________________  MMode/2D Measurements & Calculations  Ao root diam: 3.5 cm  LA dimension: 3.1 cm     asc Aorta Diam: 3.5 cm  LA/Ao: 0.88  LVOT diam: 2.2 cm  LVOT area: 3.8 cm2  Ao root diam index Ht(cm/m): 2.1  Ao root diam index BSA (cm/m2): 1.8  Asc Ao diam index BSA (cm/m2): 1.8  Asc Ao diam index Ht(cm/m): 2.1  TAPSE: 1.4 cm     Doppler Measurements & Calculations  MV E max frandy: 80.7 cm/sec  MV A max frandy: 92.2 cm/sec  MV E/A: 0.88  MV dec time: 0.15 sec  PA acc time: 0.13 sec  E/E' av.7  Lateral E/e': 11.6  Medial E/e': 11.8  RV S Frandy: 12.9 cm/sec     ______________________________________________________________________________  Report approved by: Sierra Hodgson 2024 10:18 AM

## 2024-09-06 NOTE — PROVIDER NOTIFICATION
MD Notification    Notified Person: MD Draper    Notified Person Name:    Notification Date/Time:    Notification Interaction:    Purpose of Notification: FYI: pt is confused and constantly removes his IV/tele/gown     Orders Received: seroquel ordered    Comments:

## 2024-09-06 NOTE — PROVIDER NOTIFICATION
MD Notification    Notified Person: MD    Notified Person Name: Beth Taylor    Notification Date/Time: 09/06/24 9:20 AM     Notification Interaction: nissa     Purpose of Notification:  1198    Orders Received:    Comments:

## 2024-09-06 NOTE — CARE PLAN
PRIMARY Concern: fall/new onset Afib, hyptension  SAFETY RISK Concerns (fall risk, behaviors, etc.): fall risk  Isolation/Type: n/a  Tests/Procedures for NEXT shift: Echo, AM labs  Consults? (Pending/following, signed-off?) PT/SW consult pending  Where is patient from? (Home, TCU, etc.): Home  Other Important info for NEXT shift: pt is confused and constantly removes his IV/tele/MD gabe aware  Anticipated DC date & active delays: TBD  _____________________________________________________________________________  SUMMARY NOTE:  Orientation/Cognitive: Confused, Alert to self  Observation Goals (Met/ Not Met): not met  Mobility Level/Assist Equipment: Ax2 gb/w, turn repo  Antibiotics & Plan (IV/po, length of tx left): n/a  Pain Management: denies pain  Tele/VS/O2: VSS on RA, no tele d/t pt constantly removed  ABNL Lab/BG: , 180  Diet: Reg  Bowel/Bladder: incont, pulled out the male catheter  Skin Concerns: scattered bruises/scab, Wounds on his R elbows d/t fall and is covered with dressing  Drains/Devices: pt pulled his IV/tele/male catheter, second IV pt refused to have IV running  Patient Stated Goal for Today: JULISA

## 2024-09-06 NOTE — PROGRESS NOTES
Stable ex hypotensive and positive orthostatic Bps. Alert, oriented to self, but didn't know birth year, knew he was in a hospital. Ax2 GB/W? (Therapy currently working with pt) Not oob this shift due to bps. x1 1L bolus NS given. IFV NS running at 100mL/hr. X2 PIVs. BG ACHS. Incontinent. Scattered bruising and scabs. CK total 1198. Echo done, EF 55-60%. Denies pain but getting scheduled tylenol and morphine. Tele: SR. Wife at bedside, helps with some cares like feeding and encouraging oral intake.       Observation Goals:  -Diagnostic tests and consults completed and resulted: not met  -vital signs normal or at patient baseline: not met  -tolerating oral intake to maintain hydration: not met  -adequate pain control on oral analgesics: met  -returns to baseline functional status: not met  -safe disposition plan has been identified: not met  Nurse to notify provider when observation goals have been met and patient is ready for discharge.

## 2024-09-06 NOTE — CONSULTS
Care Management Initial Consult    General Information  Assessment completed with: Patient, Family, Blanca  Type of CM/SW Visit: Initial Assessment    Primary Care Provider verified and updated as needed: Yes   Readmission within the last 30 days: no previous admission in last 30 days      Reason for Consult: discharge planning  Advance Care Planning: Advance Care Planning Reviewed: no concerns identified          Communication Assessment  Patient's communication style: spoken language (English or Bilingual)    Hearing Difficulty or Deaf: no        Cognitive  Cognitive/Neuro/Behavioral: .WDL except  Level of Consciousness: confused, alert  Arousal Level: opens eyes spontaneously  Orientation: disoriented to, time, situation (didnt know birth year.)  Mood/Behavior: calm, cooperative  Best Language: 0 - No aphasia  Speech: clear    Living Environment:   People in home: spouse  Blanca  Current living Arrangements: independent living facility      Able to return to prior arrangements: yes  Living Arrangement Comments: Lives at [facility]    Family/Social Support:  Care provided by: self, spouse/significant other  Provides care for: no one, unable/limited ability to care for self  Marital Status:   Support system: Wife, Children  Blanca       Description of Support System: Supportive    Support Assessment: Adequate family and caregiver support    Current Resources:   Patient receiving home care services: No        Community Resources: None  Equipment currently used at home: walker, rolling, cane, straight, wheelchair, manual, shower chair, grab bar, toilet, grab bar, tub/shower  Supplies currently used at home: None    Employment/Financial:  Employment Status: retired        Financial Concerns: none   Referral to Financial Worker: No       Does the patient's insurance plan have a 3 day qualifying hospital stay waiver?  No    Lifestyle & Psychosocial Needs:  Social Determinants of  Health     Food Insecurity: Unknown (9/6/2024)    Food Insecurity     Within the past 12 months, did you worry that your food would run out before you got money to buy more?: Patient declined     Within the past 12 months, did the food you bought just not last and you didn t have money to get more?: Patient declined   Depression: Not on file   Housing Stability: Unknown (9/6/2024)    Housing Stability     Do you have housing? : Patient declined     Are you worried about losing your housing?: Patient declined   Tobacco Use: Medium Risk (10/9/2023)    Patient History     Smoking Tobacco Use: Former     Smokeless Tobacco Use: Unknown     Passive Exposure: Not on file   Financial Resource Strain: Unknown (9/6/2024)    Financial Resource Strain     Within the past 12 months, have you or your family members you live with been unable to get utilities (heat, electricity) when it was really needed?: Patient declined   Alcohol Use: Not on file   Transportation Needs: Unknown (9/6/2024)    Transportation Needs     Within the past 12 months, has lack of transportation kept you from medical appointments, getting your medicines, non-medical meetings or appointments, work, or from getting things that you need?: Patient declined   Physical Activity: Not on file   Interpersonal Safety: Not on file   Stress: Not on file   Social Connections: Not on file   Health Literacy: Not on file       Functional Status:  Prior to admission patient needed assistance:   Dependent ADLs:: Ambulation-walker  Dependent IADLs:: Transportation       Mental Health Status:          Chemical Dependency Status:                Values/Beliefs:  Spiritual, Cultural Beliefs, Jainism Practices, Values that affect care: no               Discussed  Partnership in Safe Discharge Planning  document with patient/family: No    Additional Information:  Per CM consult for discharge planning, chart was reviewed. Pt is a 78 year old male who presented at [facility] on 9/5/2024  "after a fall. H&P reviewed, pt has history of \"HTN, HLP, DM2, MDD with anxiety, Insomnia, Metastatic prostate cancer, Chronic pain, Cognitive impairment, Chronic anemia, Slow transit constipation\"      Writer met with patient and spouse Blanca at bedside. Patient was sleeping but did wake to greet writer but then went back to sleep. Writer spoke about TCU recommendations and pts baseline. Blanca reports that the patient is mostly independent at baseline but does use a walker to get around. Pt and spouse live at [facility]. Blanca reports they get all their meals and there is an aid there that can help out with showers and things as needed.     Writer spoke about Transitional Care recommendations and Blanca reports that their adult children do not want him to go to a TCU. Blanca states that she was at one and had a very poor experience. Pts son lives very close to them and Blanca is home all the time. Writer spoke with Blanca about all the different options such as home care, private duty care, and TCU. Writer provided a TCU list from Medicare.gov for there area and left it with Blanca. Blanca was thankful for the information that was provided. Writer explained SW will follow for discharge planning.     Next Steps: Follow up for discharge plans    ERIBERTO SABILLON  [facility]  INPATIENT CARE COORDINATION      "

## 2024-09-06 NOTE — PROGRESS NOTES
PRIMARY Concern: fall/new onset Afib, hyptension  SAFETY RISK Concerns (fall risk, behaviors, etc.): fall risk  Isolation/Type: n/a  Tests/Procedures for NEXT shift: AM labs  Consults? (Pending/following, signed-off?) PT/SW consult pending  Where is patient from? (Home, TCU, etc.): Home  Other Important info for NEXT shift:Pt needs help with meals he needs to be feeder.  pt is confused and constantly removes his IV/tele/gown Pulled IV out X1 this evening shift.   Anticipated DC date & active delays: TBD  _________________________________________________  SUMMARY NOTE:  Orientation/Cognitive: Confused, Alert to self  Observation Goals (Met/ Not Met): not met  Mobility Level/Assist Equipment: Ax2 gb/w, turn repo  Antibiotics & Plan (IV/po, length of tx left): n/a  Pain Management: denies pain  Tele/VS/O2: VSS on RA Tele NSR  ABNL Lab/BG: , 180  Diet: Reg  Bowel/Bladder: incont  Skin Concerns: scattered bruises/scab, Wounds on his R elbows d/t fall and is covered with dressing  Drains/Devices: pt pulled his IV/tele/male catheter  Patient Stated Goal for Today: JULISA

## 2024-09-06 NOTE — PROGRESS NOTES
[facility]    Hospitalist Progress Note    Interval History   Patient is awake and alert.  Wife at bedside.  Did have episodes of severe confusion where he pulled out his telemetry and IV overnight but is quite calm this morning.  Eats better when his wife encourages him to.  Is answering questions appropriately but is also not oriented to time.    -Data reviewed today: I reviewed all new labs and imaging results over the last 24 hours. I personally reviewed the CT head and CT chest for PE image(s) showing no acute findings .    Physical Exam   Temp: 99  F (37.2  C) Temp src: Oral BP: 117/67 (supine recovery post-activity with PT) Pulse: 96   Resp: 18 SpO2: 97 % O2 Device: None (Room air)    There were no vitals filed for this visit.  Vital Signs with Ranges  Temp:  [97.8  F (36.6  C)-99  F (37.2  C)] 99  F (37.2  C)  Pulse:  [] 96  Resp:  [12-18] 18  BP: (110-138)/(60-78) 117/67  SpO2:  [95 %-99 %] 97 %  No intake/output data recorded.    Physical Exam  Constitutional:       Appearance: He is ill-appearing.   Cardiovascular:      Rate and Rhythm: Regular rhythm. Tachycardia present.      Pulses: Normal pulses.      Heart sounds: Normal heart sounds.   Pulmonary:      Effort: Pulmonary effort is normal. No respiratory distress.      Breath sounds: Normal breath sounds.   Abdominal:      General: Abdomen is flat. Bowel sounds are normal. There is no distension.      Tenderness: There is no abdominal tenderness. There is no guarding.   Skin:     General: Skin is warm and dry.      Coloration: Skin is pale.   Neurological:      General: No focal deficit present.      Comments: Not oriented to time but is oriented to place and person.  Very slow to answer questions.  Intermittently confused.  Per wife this is significantly worse than baseline.  Following commands.  Moving all extremities but very slow to move.           Medications   Current Facility-Administered Medications   Medication  Dose Route Frequency Provider Last Rate Last Admin    sodium chloride 0.9 % infusion   Intravenous Continuous Tan Gu PA-C 100 mL/hr at 09/06/24 1030 Restarted at 09/06/24 1030     Current Facility-Administered Medications   Medication Dose Route Frequency Provider Last Rate Last Admin    acetaminophen (TYLENOL) tablet 1,000 mg  1,000 mg Oral TID Tan Gu PA-C   1,000 mg at 09/06/24 1320    DULoxetine (CYMBALTA) DR capsule 20 mg  20 mg Oral BID Tan Gu PA-C   20 mg at 09/06/24 0916    insulin aspart (NovoLOG) injection (RAPID ACTING)  1-7 Units Subcutaneous TID AC Tan Gu PA-C   2 Units at 09/06/24 1321    insulin aspart (NovoLOG) injection (RAPID ACTING)  1-5 Units Subcutaneous At Bedtime Tan Gu PA-C        LORazepam (ATIVAN) tablet 0.5 mg  0.5 mg Oral BID Tan Gu PA-C   0.5 mg at 09/06/24 0917    melatonin tablet 3 mg  3 mg Oral At Bedtime Tan Gu PA-C   3 mg at 09/06/24 0052    morphine (MS CONTIN) 12 hr tablet 15 mg  15 mg Oral TID Tan Gu PA-C   15 mg at 09/06/24 1321    polyethylene glycol (MIRALAX) Packet 17 g  17 g Oral Daily Tan Gu PA-C        predniSONE (DELTASONE) tablet 5 mg  5 mg Oral BID Tan Gu PA-C   5 mg at 09/06/24 0916    QUEtiapine (SEROquel) half-tab 12.5 mg  12.5 mg Oral Once Baron De La Cruz MD        senna-docusate (SENOKOT-S/PERICOLACE) 8.6-50 MG per tablet 2 tablet  2 tablet Oral Daily Tan Gu PA-C   2 tablet at 09/06/24 0916    sertraline (ZOLOFT) tablet 100 mg  100 mg Oral QAM Tan Gu PA-C   100 mg at 09/06/24 0916    sodium chloride (PF) 0.9% PF flush 3 mL  3 mL Intracatheter Q8H Tan Gu PA-C   3 mL at 09/05/24 2049       Data   Recent Labs   Lab 09/06/24  1220 09/06/24  0832 09/06/24  0802 09/05/24  1810 09/05/24  1150   WBC  --   --  4.9  --  6.1    HGB  --   --  9.4*  --  9.3*   MCV  --   --  91  --  91   PLT  --   --  157  --  145*   NA  --   --  134*  --  134*   POTASSIUM  --   --  3.7  --  4.1   CHLORIDE  --   --  101  --  100   CO2  --   --  22  --  23   BUN  --   --  7.3*  --  13.3   CR  --   --  0.41*  --  0.57*   ANIONGAP  --   --  11  --  11   MARKELL  --   --  8.2*  --  8.4*   * 153* 157*   < > 187*   ALBUMIN  --   --  2.9*  --  3.2*   PROTTOTAL  --   --  6.5  --  6.7   BILITOTAL  --   --  0.2  --  0.3   ALKPHOS  --   --  433*  --  352*   ALT  --   --  11  --  10   AST  --   --  162*  --  76*    < > = values in this interval not displayed.       Recent Results (from the past 24 hour(s))   Echocardiogram Complete   Result Value    LVEF  55-60%    Narrative    [id number]  [id number]  [id number]  [id number]^ABIGAIL^VINICIUS^EARL     [facility]  Echocardiography Laboratory  [address] [place] [address]     Name: USAMA SAL  MRN: [id number]  : 1945  Study Date: 2024 08:04 AM  Age: 78 yrs  Gender: Male  Patient Location: [facility]  Reason For Study: Atrial Fibrillation  Ordering Physician: VINICIUS HAYES  Referring Physician: Laz Lewis MD  Performed By: Kaitlynn Barrera RDCS     BSA: 1.9 m2  Height: 67 in  Weight: 173 lb  HR: 100  BP: 138/72 mmHg  ______________________________________________________________________________  Procedure  Complete Portable Echo Adult. Optison (NDC #[id number]) given intravenously.  Complete Echo Adult.  ______________________________________________________________________________  Interpretation Summary     The visual ejection fraction is 55-60%.  No regional wall motion abnormalities noted.  Normal left atrial size.  No significant valvular heart disease.  Technically challenging study reduces sensitivity and specificity of findings.  ______________________________________________________________________________  Left Ventricle  The left ventricle is normal in  size. There is normal left ventricular wall  thickness. Diastolic Doppler findings (E/E' ratio and/or other parameters)  suggest left ventricular filling pressures are indeterminate. The visual  ejection fraction is 55-60%. No regional wall motion abnormalities noted.     Right Ventricle  The right ventricle is normal in size and function.     Atria  Normal left atrial size. Right atrial size is normal. There is no color  Doppler evidence of an atrial shunt.     Mitral Valve  The mitral valve leaflets are mildly thickened. There is trace mitral  regurgitation.     Tricuspid Valve  Tricuspid leaflets are thickened. There is trace tricuspid regurgitation. IVC  diameter and respiratory changes fall into an intermediate range suggesting an  RA pressure of 8 mmHg.     Aortic Valve  There is trivial trileaflet aortic sclerosis. No aortic regurgitation is  present.     Pulmonic Valve  There is trace pulmonic valvular regurgitation.     Vessels  Normal size aorta. The aortic root is normal size.     Pericardium  There is no pericardial effusion.     Rhythm  Sinus rhythm was noted.  ______________________________________________________________________________  MMode/2D Measurements & Calculations  Ao root diam: 3.5 cm  LA dimension: 3.1 cm     asc Aorta Diam: 3.5 cm  LA/Ao: 0.88  LVOT diam: 2.2 cm  LVOT area: 3.8 cm2  Ao root diam index Ht(cm/m): 2.1  Ao root diam index BSA (cm/m2): 1.8  Asc Ao diam index BSA (cm/m2): 1.8  Asc Ao diam index Ht(cm/m): 2.1  TAPSE: 1.4 cm     Doppler Measurements & Calculations  MV E max frandy: 80.7 cm/sec  MV A max frandy: 92.2 cm/sec  MV E/A: 0.88  MV dec time: 0.15 sec  PA acc time: 0.13 sec  E/E' av.7  Lateral E/e': 11.6  Medial E/e': 11.8  RV S Frandy: 12.9 cm/sec     ______________________________________________________________________________  Report approved by: Sierra Hodgson 2024 10:18 AM               Assessment & Plan      Migue RUBALCAVA Yong is a 78 year old male registered to  short-stay/observation on 9/5/2024 due to fall, new onset atrial fib, hypotension.       Past medical history significant for HTN, HLP, DM2, MDD with anxiety, Insomnia, Metastatic prostate cancer, Chronic pain, Cognitive impairment, Chronic anemia, Slow transit constipation.     Patient presented to the ED via ambulance from home.  Patient's wife reported that the patient has been dizzy and has had several falls over the past few days.  EMS reported that the patient was hypotensive and was in atrial fib.       Work-up in the ED included an EKG that revealed sinus rhythm with PACs, RSR' and prolonged QTc.  CMP revealed a sodium of 134, creatinine of 0.57 with a GFR greater than 90, calcium of 8.4, albumin of 3.2, alkaline phosphatase of 352, AST of 76, glucose of 187 otherwise unremarkable.  CBC with differential revealed a hemoglobin of 9.3, hematocrit of 29.4, platelet count of 145, RBC count of 3.23, RDW of 15.3, absolute lymphocytes of 0.7 otherwise unremarkable.  High-sensitivity troponin T was within normal limits at 17.  Total CK was elevated at 741.     Patient received IV fluid bolus 500 cc while in the ED.       Per report from Dr. Trierweiler, patient had a fall the night prior to presentation and remained on the floor for ~ 3 hours.  Family was unable to get the patient up off the floor and led to EMS being contacted.  Upon assessment by EMS patient was hypotensive (80/50) and in atrial fib with RVR (HR in the 150's).  En route to [facility] ED patient spontaneously converted back to normal sinus rhythm.  While in the ED,  patient's family reported that the patient has not been eating, drink fluids and has been falling frequently the past few days.       Fall   Bilateral elbow abrasions  Mild rhabdomyolysis  Orthostatic hypotension  -Head CT shows no acute hemorrhage, mass effect or herniation.  Mottled appearance of the left bilateral bone concerning for osseous serious mets more conspicuous compared to  prior imaging.  -CT chest for PE negative for pulmonary embolism but did show possible mild bronchitis.  Interval enlargement and mediastinal and hilar lymphadenopathy with mixed lytic and sclerotic osseous mets.  -Urinalysis negative for infection but did show some glucose and trace ketones.  -Was started on IV normal saline at 100 cc an hour.  -Did have worsening ---1198.  Increased IV fluids to 125 cc an hour.  -PT OT consulted.  - Orthostatic significantly positive with blood pressure dropping to 80/48 on standing up.  Patient was quite symptomatic.  1 L normal saline bolus given.  Recheck after.       New onset atrial fib with RVR (Resolved-spontaneous conversion back to NSR)  - Monitor on telemetry.    - ECHO done on 9/5/2024 shows EF 55 to 60% with no regional wall motion abnormalities or valvular disease.  -TSH 0.65, magnesium 1.8   - Hold off on starting anticoagulation therapy at this time due to frequent falls and acute on chronic anemia.    -Pulled off his telemetry overnight.     Hypotension (Resolved)  History of HTN  *Suspected secondary to dehydration and has improved following spontaneous conversion back to NSR and IV fluids.    *Previously on amlodipine but was discontinued 10/2023.    - IV fluids as above.    - Vital signs every 4 hours.       Failure to thrive  Hypoalbuminemia  - IV fluids as above.    - PT consult as above.    -Nutrition services consulted.  Concern for significant malnutrition.     Acute on chronic anemia  Mild thrombocytopenia  *Baseline HGB between 11-12.5.  Currently at 9.3.    -Continue to monitor.  No signs of active bleeding.  -Thrombocytopenia resolved at this time     Pseudohyponatremia  Sodium of 134 and corrects to 136 due to glucose level of 187.    -Continue to monitor     HLP  *No longer on statin therapy.       Elevated alk phos level  *Suspect secondary to dehydration and metastatic prostate cancer.    - IV fluids.    - Hepatic panel ordered for 9/5.        Type 2 DM  Hyperglycemia  PTA regimen includes: Metformin 1000 mg daily with supper.  -Hemoglobin A1c at 7.6  - Hold PTA metformin.  Resume at discharge.    - Medium intensity sliding scale insulin ordered.  - Glucose checks ordered.    - Hypoglycemic protocol in place.       MDD with anxiety  - Resumed on PTA Cymbalta 20 mg BID and Zoloft 100 mg/d.    - Resumed on PTA Ativan 0.5 mg BID and PRN 0.5 mg BID.       Insomnia  - Resumed on PTA PRN melatonin 3 mg at bedtime.       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  Follows with [facility].  Completed 10 sessions of chemo ~ 1 month ago and had planned to have 3-4 month break before restarting treatment, however PSA level significantly increased and he will be starting Pluvicto soon.    - Resumed on scheduled APAP 1000 mg TID.    - Resumed on PTA MS Contin 15 mg TID and PRN 15 mg daily for sever pain.    - Resumed on PTA PRN oxycodone 10 mg every 4 hours.    - Resumed on PTA prednisone 5 mg BID.    -Oncology team has been consulted.  Per their assessment patient will need to be in a better physical shape to tolerate Pluvicto therapy.  Reviewed recommendations.     Cognitive impairment   *SLUMS score of 9/30 from 9/8/2023.       Slow transit constipation  - Bowel medications available.       Clinically Significant Risk Factors Present on Admission              # Hypoalbuminemia: Lowest albumin = 2.9 g/dL at 9/6/2024  8:02 AM, will monitor as appropriate        # Anemia: based on hgb <11      # DMII: A1C = 7.6 % (Ref range: <5.7 %) within past 6 months                 DVT Prophylaxis: Pneumatic Compression Devices  Code Status: Full Code  Medically Ready for Discharge: Anticipated in 2-4 Days    Greater than 55 minutes spent on documentation review, lab review, imaging review, examining the patient and discussing care    Beth Taylor MD, MD  [phone](p)

## 2024-09-06 NOTE — PROGRESS NOTES
Observation goals  PRIOR TO DISCHARGE       Comments: -diagnostic tests and consults completed and resulted- not met,  Echo pending  -vital signs normal or at patient baseline- met  -tolerating oral intake to maintain hydration- met  -adequate pain control on oral analgesics- met  -returns to baseline functional status- not met, still weak  -safe disposition plan has been identified- not met, SW consult pending  Nurse to notify provider when observation goals have been met and patient is ready for discharge.

## 2024-09-06 NOTE — PROVIDER NOTIFICATION
MD Notification    Notified Person: MD    Notified Person Name: Beth Taylor     Notification Date/Time: 9/6/24 9395    Notification Interaction: Alicja    Purpose of Notification: Pt family at bedside asking to speak with the provider regarding the current order of Morphine as well as other concerns about plans.     Orders Received: Pending    Comments:

## 2024-09-06 NOTE — UTILIZATION REVIEW
Admission Status; Secondary Review Determination         Under the authority of the Utilization Management Committee, the utilization review process indicated a secondary review on the above patient.  The review outcome is based on review of the medical records, discussions with staff, and applying clinical experience noted on the date of the review.        (x)      Inpatient Status Appropriate - This patient's medical care is consistent with medical management for inpatient care and reasonable inpatient medical practice.          RATIONALE FOR DETERMINATION   The patient is a 78-year-old male admitted on 9/5/2024.  Patient came to the ED after having a fall and is found to have a new onset atrial fibs and hypotension.  Patient is living at home and his wife reported that he had been dizzy and has several falls over the past few days.  EMS reported hypotension also.  Patient is found to have mild rhabdomyolysis with elevated CPK which went from 7  today.  The patient is receiving fluids and creatinine is being followed closely and is currently not elevated.  The patient does have elevated AST of 182 and alk phos elevated at 433 with a low albumin of 2.9 and a hemoglobin of 9.4.  Patient did convert back to normal sinus rhythm and risk factors such as TSH are being evaluated.  Elevated alk phos is suspected to be due to dehydration and metastatic prostate cancer.  PT saw the patient and recommended TCU.  At this time, recommend switching the patient from observation status to inpatient given rising CK level and need to assure that rhabdomyolysis does not result in renal failure.  Tonight will be the second midnight stay.  Dr. Taylor will be texted with this recommendation.      The severity of illness, intensity of service provided, expected LOS and risk for adverse outcome make the care complex, high risk and appropriate for hospital admission.        The information on this document is developed by the  utilization review team in order for the business office to ensure compliance.  This only denotes the appropriateness of proper admission status and does not reflect the quality of care rendered.         The definitions of Inpatient Status and Observation Status used in making the determination above are those provided in the CMS Coverage Manual, Chapter 1 and Chapter 6, section 70.4.      Sincerely,     Ambrosio Salazar MD  Physician Advisor  Utilization Review/ Case Management  [facility].

## 2024-09-07 NOTE — PROGRESS NOTES
[facility]    Hospitalist Progress Note    Interval History     Patient is awake and alert.  Sitting up in chair.  Continues to be pleasantly confused.  Family at bedside.  Very deconditioned and needing 2 person assist to get from bed to chair.  Chronic pain.  Stable on room air.    -Data reviewed today: I reviewed all new labs and imaging results over the last 24 hours. I personally reviewed the CT head and CT chest for PE image(s) showing no acute findings .  Does have chronic malignancy associated findings.  Review report for complete details.  No acute PE or stroke or intracranial bleed.    Physical Exam   Temp: 98  F (36.7  C) Temp src: Oral BP: 129/71 Pulse: 108   Resp: 16 SpO2: 94 % O2 Device: None (Room air)    Vitals:    09/07/24 0226   Weight: 69.4 kg (153 lb)     Vital Signs with Ranges  Temp:  [97.9  F (36.6  C)-99.4  F (37.4  C)] 98  F (36.7  C)  Pulse:  [100-108] 108  Resp:  [16-18] 16  BP: (120-135)/(58-75) 129/71  SpO2:  [94 %-97 %] 94 %  I/O last 3 completed shifts:  In: 2025 [P.O.:20; I.V.:2005]  Out: -     Physical Exam  Constitutional:       Appearance: He is ill-appearing.   Cardiovascular:      Rate and Rhythm: Regular rhythm. Tachycardia present.      Pulses: Normal pulses.      Heart sounds: Normal heart sounds.   Pulmonary:      Effort: Pulmonary effort is normal. No respiratory distress.      Breath sounds: Normal breath sounds.   Abdominal:      General: Abdomen is flat. Bowel sounds are normal. There is no distension.      Tenderness: There is no abdominal tenderness. There is no guarding.   Skin:     General: Skin is warm and dry.      Coloration: Skin is pale.   Neurological:      General: No focal deficit present.      Comments: Not oriented to time but is oriented to place and person.  Very slow to answer questions.  Intermittently confused.  Per wife this is significantly worse than baseline.  Following commands.  Moving all extremities but very slow to  move.           Medications   Current Facility-Administered Medications   Medication Dose Route Frequency Provider Last Rate Last Admin    sodium chloride 0.9 % infusion   Intravenous Continuous Tan Gu PA-C 100 mL/hr at 09/07/24 0423 New Bag at 09/07/24 0423     Current Facility-Administered Medications   Medication Dose Route Frequency Provider Last Rate Last Admin    acetaminophen (TYLENOL) tablet 1,000 mg  1,000 mg Oral TID Tan Gu PA-C   1,000 mg at 09/07/24 1503    DULoxetine (CYMBALTA) DR capsule 20 mg  20 mg Oral BID Tan Gu PA-C   20 mg at 09/07/24 0915    insulin aspart (NovoLOG) injection (RAPID ACTING)  1-7 Units Subcutaneous TID AC Tan Gu PA-C   4 Units at 09/07/24 1342    insulin aspart (NovoLOG) injection (RAPID ACTING)  1-5 Units Subcutaneous At Bedtime Tan Gu PA-C        [Held by provider] LORazepam (ATIVAN) tablet 0.5 mg  0.5 mg Oral BID Tan Gu PA-C   0.5 mg at 09/06/24 0917    melatonin tablet 3 mg  3 mg Oral At Bedtime Tan Gu PA-C   3 mg at 09/06/24 2111    morphine (MS CONTIN) 12 hr tablet 15 mg  15 mg Oral Q12H [facility] (08/20) Beth Taylor MD   15 mg at 09/07/24 0914    polyethylene glycol (MIRALAX) Packet 17 g  17 g Oral Daily Tan Gu PA-C   17 g at 09/07/24 0857    predniSONE (DELTASONE) tablet 5 mg  5 mg Oral BID Tan Gu PA-C   5 mg at 09/07/24 0835    QUEtiapine (SEROquel) half-tab 12.5 mg  12.5 mg Oral Once Baron De La Cruz MD        senna-docusate (SENOKOT-S/PERICOLACE) 8.6-50 MG per tablet 2 tablet  2 tablet Oral Daily Tan Gu PA-C   2 tablet at 09/07/24 0834    sertraline (ZOLOFT) tablet 100 mg  100 mg Oral QAM Tan Gu PA-C   100 mg at 09/07/24 0857    sodium chloride (PF) 0.9% PF flush 3 mL  3 mL Intracatheter Q8H Tan Gu PA-C   3 mL at 09/05/24 2042       Data    Recent Labs   Lab 09/07/24  1235 09/07/24  0755 09/07/24  0224 09/06/24  0832 09/06/24  0802 09/05/24  1810 09/05/24  1150   WBC  --   --   --   --  4.9  --  6.1   HGB  --   --   --   --  9.4*  --  9.3*   MCV  --   --   --   --  91  --  91   PLT  --   --   --   --  157  --  145*   NA  --   --   --   --  134*  --  134*   POTASSIUM  --   --   --   --  3.7  --  4.1   CHLORIDE  --   --   --   --  101  --  100   CO2  --   --   --   --  22  --  23   BUN  --   --   --   --  7.3*  --  13.3   CR  --   --   --   --  0.41*  --  0.57*   ANIONGAP  --   --   --   --  11  --  11   MARKELL  --   --   --   --  8.2*  --  8.4*   * 176* 165*   < > 157*   < > 187*   ALBUMIN  --   --   --   --  2.9*  --  3.2*   PROTTOTAL  --   --   --   --  6.5  --  6.7   BILITOTAL  --   --   --   --  0.2  --  0.3   ALKPHOS  --   --   --   --  433*  --  352*   ALT  --   --   --   --  11  --  10   AST  --   --   --   --  162*  --  76*    < > = values in this interval not displayed.       No results found for this or any previous visit (from the past 24 hour(s)).        Assessment & Plan      Migue Beach is a 78 year old male registered to short-stay/observation on 9/5/2024 due to fall, new onset atrial fib, hypotension.       Past medical history significant for HTN, HLP, DM2, MDD with anxiety, Insomnia, Metastatic prostate cancer, Chronic pain, Cognitive impairment, Chronic anemia, Slow transit constipation.     Patient presented to the ED via ambulance from home.  Patient's wife reported that the patient has been dizzy and has had several falls over the past few days.  EMS reported that the patient was hypotensive and was in atrial fib.       Work-up in the ED included an EKG that revealed sinus rhythm with PACs, RSR' and prolonged QTc.  CMP revealed a sodium of 134, creatinine of 0.57 with a GFR greater than 90, calcium of 8.4, albumin of 3.2, alkaline phosphatase of 352, AST of 76, glucose of 187 otherwise unremarkable.  CBC with differential  revealed a hemoglobin of 9.3, hematocrit of 29.4, platelet count of 145, RBC count of 3.23, RDW of 15.3, absolute lymphocytes of 0.7 otherwise unremarkable.  High-sensitivity troponin T was within normal limits at 17.  Total CK was elevated at 741.     Patient received IV fluid bolus 500 cc while in the ED.       Per report from Dr. Trierweiler, patient had a fall the night prior to presentation and remained on the floor for ~ 3 hours.  Family was unable to get the patient up off the floor and led to EMS being contacted.  Upon assessment by EMS patient was hypotensive (80/50) and in atrial fib with RVR (HR in the 150's).  En route to [facility] patient spontaneously converted back to normal sinus rhythm.  While in the ED,  patient's family reported that the patient has not been eating, drink fluids and has been falling frequently the past few days.       Fall   Bilateral elbow abrasions  Mild rhabdomyolysis  Orthostatic hypotension  Metabolic encephalopathy  -Head CT shows no acute hemorrhage, mass effect or herniation.  Mottled appearance of the left bilateral bone concerning for osseous serious mets more conspicuous compared to prior imaging.  -CT chest for PE negative for pulmonary embolism but did show possible mild bronchitis.  Interval enlargement and mediastinal and hilar lymphadenopathy with mixed lytic and sclerotic osseous mets.  -Urinalysis negative for infection but did show some glucose and trace ketones.  -Was started on IV normal saline at 100 cc an hour which was increased to 125 cc an hour.  -Did have worsening ---1198.    -Creatinine kinase is still pending on 9/7/2024.  - Orthostatic significantly positive with blood pressure dropping to 80/48 on standing up.  Patient was quite symptomatic.  1 L normal saline bolus given.  Recheck after improved but still positive.  Continue on current IV fluids and hold all antihypertensives.  -Patient is more confused than usual.  Reduced morphine dose  to twice daily and held home lorazepam.    -PT OT consulted.  Recommending TCU.  -Patient and family very reluctant for him to go to TCU.  Currently lives in assisted living facility.  Would benefit from home services if he goes home.  Currently not stable for discharge.  - Orthostatic significantly positive with blood pressure dropping to 80/48 on standing up.  Patient was quite symptomatic.  1 L normal saline bolus given.  Recheck after improved.       New onset atrial fib with RVR (Resolved-spontaneous conversion back to NSR)  - Monitor on telemetry.    - ECHO done on 9/5/2024 shows EF 55 to 60% with no regional wall motion abnormalities or valvular disease.  -TSH 0.65, magnesium 1.8   - Hold off on starting anticoagulation therapy at this time due to frequent falls and acute on chronic anemia.    -Pulled off his telemetry overnight.     Hypotension (Resolved)  History of HTN  *Suspected secondary to dehydration and has improved following spontaneous conversion back to NSR and IV fluids.    *Previously on amlodipine but was discontinued 10/2023.    - IV fluids as above.    - Vital signs every 4 hours.       Failure to thrive  Hypoalbuminemia  - IV fluids as above.    - PT consult as above.    -Nutrition services consulted.  Concern for significant malnutrition.  - ensure supplements ordered     Acute on chronic anemia  Mild thrombocytopenia  *Baseline HGB between 11-12.5.  Currently at 9.3.    -Continue to monitor.  No signs of active bleeding.  -Thrombocytopenia resolved at this time     Pseudohyponatremia  Sodium of 134 and corrects to 136 due to glucose level of 187.    -Continue to monitor     HLP  *No longer on statin therapy.       Elevated alk phos level  *Suspect secondary to dehydration and metastatic prostate cancer.    - IV fluids.    - Hepatic panel ordered for 9/5.       Type 2 DM  Hyperglycemia  PTA regimen includes: Metformin 1000 mg daily with supper.  -Hemoglobin A1c at 7.6  - Hold PTA metformin.   Resume at discharge.    - Medium intensity sliding scale insulin ordered.  - Glucose checks ordered.    - Hypoglycemic protocol in place.       MDD with anxiety  - Resumed on PTA Cymbalta 20 mg BID and Zoloft 100 mg/d.    - Resumed on PTA Ativan 0.5 mg BID and PRN 0.5 mg BID.       Insomnia  - Resumed on PTA PRN melatonin 3 mg at bedtime.       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  Follows with [facility].  Completed 10 sessions of chemo ~ 1 month ago and had planned to have 3-4 month break before restarting treatment, however PSA level significantly increased and he will be starting Pluvicto soon.    - Resumed on scheduled APAP 1000 mg TID.    - Reduced PTA to MS Contin 15 mg bid and PRN 15 mg daily for sever pain.    - Resumed on PTA PRN oxycodone 10 mg every 4 hours.    - Resumed on PTA prednisone 5 mg BID.    -Oncology team has been consulted.  Per their assessment patient will need to be in a better physical shape to tolerate Pluvicto therapy.  Reviewed recommendations.  If patient is still here by Monday then oncology team will contact radiation oncology to complete the consult in the hospital     Cognitive impairment   *SLUMS score of 9/30 from 9/8/2023.       Slow transit constipation  - Bowel medications available.       Clinically Significant Risk Factors Present on Admission              # Hypoalbuminemia: Lowest albumin = 2.9 g/dL at 9/6/2024  8:02 AM, will monitor as appropriate          # Anemia: based on hgb <11      # DMII: A1C = 7.6 % (Ref range: <5.7 %) within past 6 months        # Financial/Environmental Concerns: none           DVT Prophylaxis: Pneumatic Compression Devices  Code Status: Full Code  Medically Ready for Discharge: Anticipated in 2-4 Days    Greater than 55 minutes spent on documentation review, lab review, examining the patient and having an extensive discussion regarding disposition with patient's  son at bedside and patient's wife    Beth Taylor MD, MD  [phone](p)

## 2024-09-07 NOTE — PLAN OF CARE
PRIMARY Concern: new a-fib and hypotension  SAFETY RISK Concerns (fall risk, behaviors, etc.): fall risk      Isolation/Type: none  Tests/Procedures for NEXT shift: none  Consults? (Pending/following, signed-off?) heme/onc following, pharmacy liaison consult, PT following, SW following  Where is patient from? (Home, TCU, etc.): [facility], lives with family  Other Important info for NEXT shift: family is very involved in pt's care  Anticipated DC date & active delays: 9/9 pending improvement in symptoms and safe discharge disposition  _____________________________________________________________________________  SUMMARY NOTE:  Orientation/Cognitive: Alert to self in AM, A&Ox3 in the afternoon  Mobility Level/Assist Equipment: strong assist x2, walker, GB in AM to get to chair. Assist x1, walker, GB to bathroom in the evening. Up in chair for meals  Antibiotics & Plan (IV/po, length of tx left): n/a  Pain Management: reports back pain with movement. Denies pain at rest. Scheduled tylenol and MS Contin given  Tele/VS/O2: tele- SR/ST, orthostatics positive  ABNL Lab/BG: hgb 8.5, , , 291, 193  Diet: regular with nutritional supplements  Bowel/Bladder: incontinent  Skin Concerns: right and left elbow abrasions covered with mepilex, scattered bruising  Drains/Devices: PIV infusing  Patient Stated Goal for Today: rest

## 2024-09-07 NOTE — CONSULTS
"CLINICAL NUTRITION SERVICES  -  ASSESSMENT NOTE      Recommendations Ordered by Registered Dietitian (RD):   Ensure Enlive 1x/day (350 kcal, 20 g protein)      Malnutrition:   Does not meet criteria for malnutrition           REASON FOR ASSESSMENT  Migue Beach is a 78 year old male seen by Registered Dietitian for Provider Order - Malnutrition. PMH includes HTN, HLP, DM2, MMD with anxiety, insomnia, metastatic prostate cancer, chronic pain, cognitive impairment, chronic anemia, constipation. Admitted due to fall.       NUTRITION HISTORY  Patient's family reports he has not been eating/drinking in the past few days prior to admission. Unable to obtain nutrition hx from patient as busy with other cares during attempts to assess.       CURRENT NUTRITION ORDERS  Diet Order:     Regular     Current Intake/Tolerance:  Fair, but likely inadequate. Eating 50-75% meals.       NUTRITION FOCUSED PHYSICAL ASSESSMENT FOR DIAGNOSING MALNUTRITION)      Observed:    No nutrition-related physical findings observed    ANTHROPOMETRICS  Height: 5' 7\"  Weight: 152 lbs 15.99 oz (69.4 kg)   Body mass index is 23.96 kg/m .  Weight Status:  Normal BMI  IBW: 67.3 kg  % IBW: 103  Weight History: Based on records, patient has lost 20 lbs (11.5% body weight) over the past 11 months.   Wt Readings from Last 10 Encounters:   09/07/24 69.4 kg (153 lb)   10/09/23 78.5 kg (173 lb)   10/05/23 78.7 kg (173 lb 6.4 oz)   10/02/23 78.5 kg (173 lb)   09/25/23 78.5 kg (173 lb)   09/22/23 78.5 kg (173 lb)   09/18/23 78.5 kg (173 lb)   09/11/23 78.8 kg (173 lb 12.8 oz)   09/08/23 78.5 kg (173 lb)   09/03/23 79 kg (174 lb 2.6 oz)         LABS  Labs reviewed  Na 134, Cr 0.4 (likely r/t low lean body mass), ,      MEDICATIONS  Medications reviewed  Insulin, Miralax, Senna-docusate, NS @ 100 ml/hr       ASSESSED NUTRITION NEEDS PER APPROVED PRACTICE GUIDELINES:    Dosing Weight 69 kg (actual)   Estimated Energy Needs: 4746-7774 kcals (25-30 " Kcal/Kg)  Justification: maintenance  Estimated Protein Needs: 55-70 grams protein (0.8-1 g pro/Kg)  Justification: preservation of lean body mass  Estimated Fluid Needs: 6678-7288  mL (25-30 mL/kg)  Justification: maintenance    MALNUTRITION:  % Weight Loss:  Weight loss does not meet criteria for malnutrition   % Intake:  Decreased intake does not meet criteria for malnutrition   Subcutaneous Fat Loss:  None observed  Muscle Loss:  None observed  Fluid Retention:  None noted    Malnutrition Diagnosis: Patient does not meet two of the above criteria necessary for diagnosing malnutrition    NUTRITION DIAGNOSIS:  Inadequate oral intake related to reduced appetite as evidenced by meeting <75% estimated nutrition needs (or more) in acute time frame.       INTERVENTIONS  Recommendations / Nutrition Prescription  See above    Goals  Pt to consume at least 75% of three nutritionally adequate meals per day (or equivalent via snacks/supplements).       MONITORING AND EVALUATION:  Progress towards goals will be monitored and evaluated per protocol and Practice Guidelines    Augustina Cortes RD, LD, CNSC

## 2024-09-07 NOTE — PLAN OF CARE
Goal Outcome Evaluation:  PRIMARY Concern: fall/new onset Afib, hyptension  SAFETY RISK Concerns (fall risk, behaviors, etc.): fall risk  Isolation/Type: n/a  Tests/Procedures for NEXT shift: AM labs  Consults? (Pending/following, signed-off?) PT/SW consult pending  Where is patient from? (Home, TCU, etc.): Home  Other Important info for NEXT shift: Pt on room service and needs to be fed.  Anticipated DC date & active delays: TBD  _________________________________________________  SUMMARY NOTE:  Orientation/Cognitive: Alert to self only  Observation Goals (Met/ Not Met): not met  Mobility Level/Assist Equipment: Ax2 turn repo  Antibiotics & Plan (IV/po, length of tx left): n/a  Pain Management: denies pain  Tele/VS/O2: VSS on RA Tele NSR  ABNL Lab/BG:   Diet: Reg  Bowel/Bladder: Incontinent of bowel/bladder  Skin Concerns: scattered bruises/scab, Wounds on his R elbows d/t fall and is covered with dressing  Drains/Devices:  IV  NS infusing at 100 ml/hr  Patient Stated Goal for Today: JULISA

## 2024-09-07 NOTE — PROGRESS NOTES
Care Management Follow Up    Length of Stay (days): 1    Expected Discharge Date: 09/09/2024     Concerns to be Addressed: discharge planning     Patient plan of care discussed at interdisciplinary rounds: Yes    Anticipated Discharge Disposition:                Anticipated Discharge Services:    Anticipated Discharge DME:      Patient/family educated on Medicare website which has current facility and service quality ratings: yes  Education Provided on the Discharge Plan:    Patient/Family in Agreement with the Plan: other (see comments) (PT recomending TCU, family not on board)    Referrals Placed by CM/SW:    Private pay costs discussed:  Included in our conversation today that [facility] is private pay    Discussed  Partnership in Safe Discharge Planning  document with patient/family: No     Handoff Completed: Yes, [facility] PCP: Internal handoff referral completed    Additional Information:  Writer met with patient and his son in patients room, introduced self and role in discharge planning.  Writer talked with patient and son about HHC vs TCU.  Patient and his wife have been to several TCU's recently and per patient's son, there is quite a range of good, bad and acceptable.  He would like to avoid TCU, but does state he wants the best care for his dad.  He is working with [facility] [facility] on [facility].  Writer explained Migue could get PT, OT RN and [facility] if homebound;  and writer will send a referral to [facility] since that is the agency they are currently working with.  If plan changes, we can easily cancel the HHC.  Son is in agreement.    Next Steps:   [facility] Referral sent-watch for reply  Revisit patient and family to see if any interest in TCU    Sade Howell RN

## 2024-09-07 NOTE — PROGRESS NOTES
[facility] Follow Up Note:    Assessment and Plan:  Arash is an 78 year old with metastatic prostate cancer admitted with progressive weakness     Metastatic prostate cancer  - Below is from a recent office note by Dr. Damon on 8/26/2024  - The patient has metastatic castration resistant prostate cancer, with lymph node and bone metastases.    - Completed 10 cycles of Taxotere/prednisone in June 2024.  - He remains on leuprolide, next 1/2025  - PSA doubled in a month after finishing Taxotere.  Now with worsening low back pain likely due to disease progression  - Disease is positive of PSMA PET scan  - Dr. Damon discussed Pluvicto versus cabazitaxel versus hospice/comfort care.  Patient is interested in trying Pluvicto.  Discussed efficacy and potential side effects  - He will need to get a little stronger before we can try additional therapy even if this means he needs to go to TCU  - He has been referred to Radiation Oncology to discuss Pluvicto. This is not something that would be initiated as an inpatient, therefore it can be deferred until after discharge.  - Dr. Damon has reached out to Dr. Devine ( radiation) - to consider Pluvicto.    PLAN:  - rehab and strengthening  - Pluvicto would be an OP treatment, not given in the hospital     Weakness  Falls  - Likely a combination of progressive disease and treatment, but also poor intake  - Work with PT/OT  - Work on nutrition and hydration  - Continue to explore goals of care, but right now he'd like to consider more treatment    PLAN: d/w him and one son about TCU. Noted SW input also    Subjective:    Weak, assist of two.      Labs:  All labs reviewed    CBC  Recent Labs   Lab 09/06/24  0802 09/05/24  1150   WBC 4.9 6.1   HGB 9.4* 9.3*   MCV 91 91    145*       CMP  Recent Labs   Lab 09/07/24  0755 09/07/24  0224 09/06/24  2102 09/06/24  1739 09/06/24  0832 09/06/24  0802 09/05/24  1810 09/05/24  1150   NA  --   --   --   --   --  134*  --  " 134*   POTASSIUM  --   --   --   --   --  3.7  --  4.1   CHLORIDE  --   --   --   --   --  101  --  100   CO2  --   --   --   --   --  22  --  23   ANIONGAP  --   --   --   --   --  11  --  11   * 165* 145* 102*   < > 157*   < > 187*   BUN  --   --   --   --   --  7.3*  --  13.3   CR  --   --   --   --   --  0.41*  --  0.57*   GFRESTIMATED  --   --   --   --   --  >90  --  >90   MARKELL  --   --   --   --   --  8.2*  --  8.4*   MAG  --   --   --   --   --   --   --  1.8   PHOS  --   --   --   --   --   --   --  2.8   PROTTOTAL  --   --   --   --   --  6.5  --  6.7   ALBUMIN  --   --   --   --   --  2.9*  --  3.2*   BILITOTAL  --   --   --   --   --  0.2  --  0.3   ALKPHOS  --   --   --   --   --  433*  --  352*   AST  --   --   --   --   --  162*  --  76*   ALT  --   --   --   --   --  11  --  10    < > = values in this interval not displayed.       INRNo lab results found in last 7 days.    Blood CultureNo results for input(s): \"CULT\" in the last 168 hours.      Sharon Patel MD  [facility]  9/7/2024 10:39 AM    52 minutes - 25 minutes with patient, 5 minutes with family, rest in chart review.     "

## 2024-09-08 NOTE — PLAN OF CARE
PRIMARY Concern: new a-fib and hypotension  SAFETY RISK Concerns (fall risk, behaviors, etc.): fall risk      Isolation/Type: none  Tests/Procedures for NEXT shift: none  Consults? (Pending/following, signed-off?) heme/onc following, pharmacy liaison consult, PT following, SW following. Palliative and neurosurgery consult ordered  Where is patient from? (Home, TCU, etc.): shelter, lives with family  Other Important info for NEXT shift: Subacute subdural hematomas found on MRI. Neurosurgery consulted.  Anticipated DC date & active delays: TBD  _____________________________________________________________________________  SUMMARY NOTE:  Orientation/Cognitive: Alert to self in AM, A&Ox3 in the afternoon  Mobility Level/Assist Equipment: strong assist x2, walker, GB in AM to get to chair. Assist x1, walker, GB to bathroom in the evening. Up in chair for meals  Antibiotics & Plan (IV/po, length of tx left): n/a  Pain Management: reports back pain with movement. Denies pain at rest. Scheduled tylenol and MS Contin given  Tele/VS/O2: VSS, RA off tele   ABNL Lab/BG: hgb 7.9, , , 213, and 213  Diet: regular with nutritional supplements  Bowel/Bladder: incontinent  Skin Concerns: right and left elbow abrasions covered with mepilex, scattered bruising  Drains/Devices: PIV infusing   Patient Stated Goal for Today: rest

## 2024-09-08 NOTE — PROGRESS NOTES
[facility] Follow Up Note:    Assessment and Plan:  Arash is an 78 year old with metastatic prostate cancer admitted with progressive weakness     Metastatic prostate cancer  - Below is from a recent office note by Dr. Damon on 8/26/2024  - The patient has metastatic castration resistant prostate cancer, with lymph node and bone metastases.    - Completed 10 cycles of Taxotere/prednisone in June 2024.  - He remains on leuprolide, next 1/2025  - PSA doubled in a month after finishing Taxotere.  Now with worsening low back pain likely due to disease progression  - Disease is positive of PSMA PET scan  - Dr. Damon discussed Pluvicto versus cabazitaxel versus hospice/comfort care.  Patient is interested in trying Pluvicto.  Discussed efficacy and potential side effects  - He will need to get a little stronger before we can try additional therapy even if this means he needs to go to TCU  - He has been referred to Radiation Oncology to discuss Pluvicto. This is not something that would be initiated as an inpatient, therefore it can be deferred until after discharge.  - Dr. Damon has reached out to Dr. Devine ( radiation) - to consider Pluvicto.     PLAN:  - rehab and strengthening  - Pluvicto would be an OP treatment, not given in the hospital  - I d/w son and hey would like to get the ball rolling on insurance approval for Pluvicto and a virtual visit with Dr. Devine     Weakness  Falls  - Likely a combination of progressive disease and treatment, but also poor intake  - Work with PT/OT  - Work on nutrition and hydration  - Continue to explore goals of care, but right now he'd like to consider more treatment     PLAN: d/w him and  son about TCU. Noted SW input also( appreciated)  - son asked me, about goals of care- I d/w concept on hospice, no decisions made  - Palliative also consulted- they are reviewing all options including HHC, TCU and lastly palliative    Changes in mental status  - drugs/ sun  "downing.    PLAN: brain MRI      Subjective:    More confused today, but pleasant, denies any pain      Labs:  All labs reviewed    CBC  Recent Labs   Lab 09/07/24  1815 09/06/24  0802 09/05/24  1150   WBC 4.5 4.9 6.1   HGB 8.5* 9.4* 9.3*   MCV 93 91 91   * 157 145*       CMP  Recent Labs   Lab 09/08/24  1223 09/08/24  0740 09/08/24  0207 09/07/24  2258 09/06/24  0832 09/06/24  0802 09/05/24  1810 09/05/24  1150   NA  --   --   --   --   --  134*  --  134*   POTASSIUM  --   --   --   --   --  3.7  --  4.1   CHLORIDE  --   --   --   --   --  101  --  100   CO2  --   --   --   --   --  22  --  23   ANIONGAP  --   --   --   --   --  11  --  11   * 145* 189* 181*   < > 157*   < > 187*   BUN  --   --   --   --   --  7.3*  --  13.3   CR  --   --   --   --   --  0.41*  --  0.57*   GFRESTIMATED  --   --   --   --   --  >90  --  >90   MARKELL  --   --   --   --   --  8.2*  --  8.4*   MAG  --   --   --   --   --   --   --  1.8   PHOS  --   --   --   --   --   --   --  2.8   PROTTOTAL  --   --   --   --   --  6.5  --  6.7   ALBUMIN  --   --   --   --   --  2.9*  --  3.2*   BILITOTAL  --   --   --   --   --  0.2  --  0.3   ALKPHOS  --   --   --   --   --  433*  --  352*   AST  --   --   --   --   --  162*  --  76*   ALT  --   --   --   --   --  11  --  10    < > = values in this interval not displayed.       INRNo lab results found in last 7 days.    Blood CultureNo results for input(s): \"CULT\" in the last 168 hours.      Sharon Patel MD  [facility]  9/8/2024 1:34 PM    T: 52 minutes--35 minutes in room- rest in chart review     "

## 2024-09-08 NOTE — PROGRESS NOTES
[facility]    Hospitalist Progress Note    Interval History     Patient is awake and alert.  Continues to be confused but improved since admission.  Family quite concerned about his altered mentation which they think is significantly worse compared to 5 days ago.  Unfortunately I think this is multifactorial and not easily reversible at this point.    -Data reviewed today: I reviewed all new labs and imaging results over the last 24 hours. I personally reviewed the CT head and CT chest for PE image(s) showing no acute findings .  Does have chronic malignancy associated findings.  Review report for complete details.  No acute PE or stroke or intracranial bleed.    Physical Exam   Temp: 97.9  F (36.6  C) Temp src: Oral BP: 134/77 Pulse: 99   Resp: 16 SpO2: 98 % O2 Device: None (Room air)    Vitals:    09/07/24 0226 09/08/24 0633   Weight: 69.4 kg (153 lb) 71.5 kg (157 lb 10.1 oz)     Vital Signs with Ranges  Temp:  [97.4  F (36.3  C)-99.3  F (37.4  C)] 97.9  F (36.6  C)  Pulse:  [] 99  Resp:  [16-18] 16  BP: (113-149)/(61-89) 134/77  SpO2:  [94 %-98 %] 98 %  I/O last 3 completed shifts:  In: 3407.33 [P.O.:720; I.V.:2687.33]  Out: -     Physical Exam  Constitutional:       Appearance: He is ill-appearing.   Cardiovascular:      Rate and Rhythm: Regular rhythm. Tachycardia present.      Pulses: Normal pulses.      Heart sounds: Normal heart sounds.   Pulmonary:      Effort: Pulmonary effort is normal. No respiratory distress.      Breath sounds: Normal breath sounds.   Abdominal:      General: Abdomen is flat. Bowel sounds are normal. There is no distension.      Tenderness: There is no abdominal tenderness. There is no guarding.   Skin:     General: Skin is warm and dry.      Coloration: Skin is pale.   Neurological:      General: No focal deficit present.      Comments: Not oriented to time but is oriented to place and person.  Very slow to answer questions.  Intermittently confused.   Per wife this is significantly worse than baseline.  Following commands.  Moving all extremities but very slow to move.           Medications   Current Facility-Administered Medications   Medication Dose Route Frequency Provider Last Rate Last Admin    sodium chloride 0.9 % infusion   Intravenous Continuous Tan Gu PA-C 100 mL/hr at 09/08/24 1216 New Bag at 09/08/24 1216     Current Facility-Administered Medications   Medication Dose Route Frequency Provider Last Rate Last Admin    acetaminophen (TYLENOL) tablet 1,000 mg  1,000 mg Oral TID Tan Gu PA-C   1,000 mg at 09/08/24 1313    DULoxetine (CYMBALTA) DR capsule 20 mg  20 mg Oral BID Tan Gu PA-C   20 mg at 09/08/24 0817    insulin aspart (NovoLOG) injection (RAPID ACTING)  1-7 Units Subcutaneous TID AC Tan Gu PA-C   2 Units at 09/08/24 1313    insulin aspart (NovoLOG) injection (RAPID ACTING)  1-5 Units Subcutaneous At Bedtime Tan Gu PA-C        [Held by provider] LORazepam (ATIVAN) tablet 0.5 mg  0.5 mg Oral BID Tan Gu PA-C   0.5 mg at 09/06/24 0917    melatonin tablet 3 mg  3 mg Oral At Bedtime Tan Gu PA-C   3 mg at 09/07/24 2254    morphine (MS CONTIN) 12 hr tablet 15 mg  15 mg Oral Q12H [facility] (08/20) Beth Taylor MD   15 mg at 09/08/24 0817    polyethylene glycol (MIRALAX) Packet 17 g  17 g Oral Daily Tan Gu PA-C   17 g at 09/08/24 0817    predniSONE (DELTASONE) tablet 5 mg  5 mg Oral BID Tan Gu PA-C   5 mg at 09/08/24 0817    senna-docusate (SENOKOT-S/PERICOLACE) 8.6-50 MG per tablet 2 tablet  2 tablet Oral Daily Tan Gu PA-C   2 tablet at 09/08/24 0817    sertraline (ZOLOFT) tablet 100 mg  100 mg Oral QAM Tan Gu PA-C   100 mg at 09/08/24 0817    sodium chloride (PF) 0.9% PF flush 3 mL  3 mL Intracatheter Q8H Tan Gu PA-C   3 mL at  09/07/24 2042       Data   Recent Labs   Lab 09/08/24  1223 09/08/24  0740 09/08/24  0207 09/07/24  2258 09/07/24  1815 09/06/24  0832 09/06/24  0802 09/05/24  1810 09/05/24  1150   WBC  --   --   --   --  4.5  --  4.9  --  6.1   HGB  --   --   --   --  8.5*  --  9.4*  --  9.3*   MCV  --   --   --   --  93  --  91  --  91   PLT  --   --   --   --  142*  --  157  --  145*   NA  --   --   --   --   --   --  134*  --  134*   POTASSIUM  --   --   --   --   --   --  3.7  --  4.1   CHLORIDE  --   --   --   --   --   --  101  --  100   CO2  --   --   --   --   --   --  22  --  23   BUN  --   --   --   --   --   --  7.3*  --  13.3   CR  --   --   --   --   --   --  0.41*  --  0.57*   ANIONGAP  --   --   --   --   --   --  11  --  11   MARKELL  --   --   --   --   --   --  8.2*  --  8.4*   * 145* 189*   < >  --    < > 157*   < > 187*   ALBUMIN  --   --   --   --   --   --  2.9*  --  3.2*   PROTTOTAL  --   --   --   --   --   --  6.5  --  6.7   BILITOTAL  --   --   --   --   --   --  0.2  --  0.3   ALKPHOS  --   --   --   --   --   --  433*  --  352*   ALT  --   --   --   --   --   --  11  --  10   AST  --   --   --   --   --   --  162*  --  76*    < > = values in this interval not displayed.       Recent Results (from the past 24 hour(s))   MR Brain w/o & w Contrast    Narrative    EXAM: MR BRAIN W/O and W CONTRAST  LOCATION: [facility]  DATE: 9/8/2024    INDICATION: Neurological symptoms, stroke.  COMPARISON: Head CT 9/5/2024.  CONTRAST: 7 mL Sam  TECHNIQUE: Routine multiplanar multisequence head MRI without and with intravenous contrast.    FINDINGS:  Thin subacute subdural hematomas along the bilateral convexities, left larger than right. The larger left convexity subdural hematoma measures up to about 3 mm in thickness. Questionable thin subdural hemorrhage along the falx. Associated presumed   reactive dural thickening and enhancement. Probable subtle correlate subdural hematoma along the  left convexity in retrospect on the prior head CT. No high-grade mass effect.    No convincing acute infarct. Questionable subtle diffusion changes within the felice (for example, series 602 image 10) which may be artifactual. Volume loss is present with scattered nonspecific white matter T2 hyperintensities likely representing chronic   small vessel ischemic change. 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    Bone marrow demonstrates diffuse less than typically expected patchy areas of T1 hypointense signal with areas of T2 hyperintense signal and enhancement most conspicuous involving the left parietal bone with mild paranasal sinus mucosal thickening.   Small-volume fluid signal scattered throughout the bilateral mastoid cavities.      Impression    IMPRESSION:  1.  Thin subacute subdural hematomas along the bilateral convexities, left larger than right. No high-grade mass effect.  2.  Associated dural thickening and enhancement, nonspecific, likely reactive.  3.  No convincing acute infarct.  4.  Questionable subtle diffusion changes within the felice which may be artifactual (for example series 602 image 10).  5.  Old left occipital infarct.  6.  Known widespread osseous metastatic disease.           Assessment & Plan      [place] is a 78 year old male registered to short-stay/observation on 9/5/2024 due to fall, new onset atrial fib, hypotension.       Past medical history significant for HTN, HLP, DM2, MDD with anxiety, Insomnia, Metastatic prostate cancer, Chronic pain, Cognitive impairment, Chronic anemia, Slow transit constipation.     Patient presented to the ED via ambulance from home.  Patient's wife reported that the patient has been dizzy and has had several falls over the past few days.  EMS reported that the patient was hypotensive and was in atrial fib.       Work-up in the ED included an EKG that revealed sinus rhythm with PACs, RSR' and prolonged QTc.  CMP revealed a sodium of 134, creatinine of  0.57 with a GFR greater than 90, calcium of 8.4, albumin of 3.2, alkaline phosphatase of 352, AST of 76, glucose of 187 otherwise unremarkable.  CBC with differential revealed a hemoglobin of 9.3, hematocrit of 29.4, platelet count of 145, RBC count of 3.23, RDW of 15.3, absolute lymphocytes of 0.7 otherwise unremarkable.  High-sensitivity troponin T was within normal limits at 17.  Total CK was elevated at 741.     Patient received IV fluid bolus 500 cc while in the ED.       Per report from Dr. Trierweiler, patient had a fall the night prior to presentation and remained on the floor for ~ 3 hours.  Family was unable to get the patient up off the floor and led to EMS being contacted.  Upon assessment by EMS patient was hypotensive (80/50) and in atrial fib with RVR (HR in the 150's).  En route to [facility] patient spontaneously converted back to normal sinus rhythm.  While in the ED,  patient's family reported that the patient has not been eating, drink fluids and has been falling frequently the past few days.       Fall   Bilateral elbow abrasions  Mild rhabdomyolysis  Orthostatic hypotension  Metabolic encephalopathy  -Head CT shows no acute hemorrhage, mass effect or herniation.  Mottled appearance of the left bilateral bone concerning for osseous serious mets more conspicuous compared to prior imaging.  -CT chest for PE negative for pulmonary embolism but did show possible mild bronchitis.  Interval enlargement and mediastinal and hilar lymphadenopathy with mixed lytic and sclerotic osseous mets.  -Urinalysis negative for infection but did show some glucose and trace ketones.  -Was started on IV normal saline at 100 cc an hour which was increased to 125 cc an hour.  - ---1198--778  -Creatinine kinase is still pending on 9/7/2024.  - Orthostatic significantly positive with blood pressure dropping to 80/48 on standing up.  Patient was quite symptomatic.  1 L normal saline bolus given.  Recheck after  improved but still positive.  Continue on current IV fluids and hold all antihypertensives.  -Patient is more confused than usual.  Reduced morphine dose to twice daily and held home lorazepam.    -PT OT consulted.  Recommending TCU.  -Patient and family very reluctant for him to go to TCU.  Currently lives in assisted living facility.  Would benefit from home services if he goes home.  Currently not stable for discharge.  - Orthostatic significantly positive with blood pressure dropping to 80/48 on standing up.  Patient was quite symptomatic.  1 L normal saline bolus given.  Continue on maintenance normal saline at 100 cc an hour.  Repeat orthostatics ordered today.  -Palliative care consulted.    Thin subacute subdural hematomas  -Initial head CT did not show any intracranial bleed but MRI brain was done on 9/8/2024 due to persistent altered mentation.  -MRI shows thin subacute subdural hematomas along bilateral convexities with left greater than right with no mass effect.  Associated dural thickening and enhancement.  Old left occipital infarct.  Known widespread disease.  -Neurosurgery consulted.     New onset atrial fib with RVR (Resolved-spontaneous conversion back to NSR)  - Monitor on telemetry.    - ECHO done on 9/5/2024 shows EF 55 to 60% with no regional wall motion abnormalities or valvular disease.  -TSH 0.65, magnesium 1.8   - Hold off on starting anticoagulation therapy at this time due to frequent falls and acute on chronic anemia.    -Pulled off his telemetry .  Has been persistently tachycardic.  EKG shows sinus tach with PACs.  Continue to monitor for now.     Hypotension (Resolved)  History of HTN  *Suspected secondary to dehydration and has improved following spontaneous conversion back to NSR and IV fluids.    *Previously on amlodipine but was discontinued 10/2023.    - IV fluids as above.    - Vital signs every 4 hours.       Failure to thrive  Hypoalbuminemia  - IV fluids as above.    - PT  consult as above.    -Nutrition services consulted.  Concern for significant malnutrition.  - ensure supplements ordered     Acute on chronic anemia  Mild thrombocytopenia  *Baseline HGB between 11-12.5.  Currently at 8.5  -Continue to monitor.  No signs of active bleeding.  -Thrombocytopenia resolved at this time     Pseudohyponatremia  Sodium of 134 and corrects to 136 due to glucose level of 187.    -Continue to monitor     HLP  *No longer on statin therapy.       Elevated alk phos level  *Suspect secondary to dehydration and metastatic prostate cancer.    - IV fluids.    - Hepatic panel shows elevated AST and normal ALT.  Elevated alk phos.  Likely secondary to prostate cancer.     Type 2 DM  Hyperglycemia  PTA regimen includes: Metformin 1000 mg daily with supper.  -Hemoglobin A1c at 7.6  - Hold PTA metformin.  Resume at discharge.    - Medium intensity sliding scale insulin ordered.  - Glucose checks ordered.    - Hypoglycemic protocol in place.       MDD with anxiety  - Resumed on PTA Cymbalta 20 mg BID and Zoloft 100 mg/d.    - Resumed on PTA Ativan 0.5 mg BID and PRN 0.5 mg BID.       Insomnia  - Resumed on PTA PRN melatonin 3 mg at bedtime.       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  Follows with [facility].  Completed 10 sessions of chemo ~ 1 month ago and had planned to have 3-4 month break before restarting treatment, however PSA level significantly increased and he will be starting Pluvicto soon.    - Resumed on scheduled APAP 1000 mg TID.    - Reduced PTA to MS Contin 15 mg bid and PRN 15 mg daily for sever pain.    - Resumed on PTA PRN oxycodone 10 mg every 4 hours.    - Resumed on PTA prednisone 5 mg BID.    -Oncology team has been consulted.  Reviewed recommendations.  Plan on having radiation oncology do a virtual consult with the patient and family to arrange for outpatient treatment.  -Family did request to talk to  palliative care.  Consult placed.     Cognitive impairment   *SLUMS score of 9/30 from 9/8/2023.       Slow transit constipation  - Bowel medications available.       Clinically Significant Risk Factors              # Hypoalbuminemia: Lowest albumin = 2.9 g/dL at 9/6/2024  8:02 AM, will monitor as appropriate                # DMII: A1C = 7.6 % (Ref range: <5.7 %) within past 6 months, PRESENT ON ADMISSION      # Financial/Environmental Concerns: none           DVT Prophylaxis: Pneumatic Compression Devices  Code Status: Full Code  Medically Ready for Discharge: Anticipated in 2-4 Days    Greater than 55 minutes spent on documentation review, lab review, imaging review, examining the patient and discussing care    Beth Taylor MD, MD  [phone](p)

## 2024-09-08 NOTE — PROVIDER NOTIFICATION
MD Notification    Notified Person: MD    Notified Person Name: Brandon    Notification Date/Time: 9/8 1406    Notification Interaction: Alicja    Purpose of Notification: his VS are stable, but he's starting to look paler than when i came on, no signs of bleeding and no BM today, do you want to check a Hgb?     Orders Received: CBC w/ plts ordered

## 2024-09-08 NOTE — PLAN OF CARE
Goal Outcome Evaluation:    Orientation: A&O to self only  Aggression Stop Light: green  Activity: A1-2 GBW, heavy A2 this shift  Diet/BS Checks: regular diet, BG-ACHS  Tele: Sinus Tachy  IV Access/Drains: L PIV infusing   Pain Management: pain to lower back, exacerbated with movement, repositioned, scheduled Tylenol and MS Contin  Abnormal VS/Results: VSS on RA except tachy, HTN  Bowel/Bladder: incontinent b/b, BSC at bedside, brief in place  Skin/Wounds: scattered bruising and scabbing, abrasions to bilat elbows Mepi's in place  Consults: Nutrition, Hem/Onc, SWI  D/C Disposition: Plan pending

## 2024-09-08 NOTE — PLAN OF CARE
Goal Outcome Evaluation:  PRIMARY Concern: new a-fib and hypotension  SAFETY RISK Concerns (fall risk, behaviors, etc.): fall risk      Isolation/Type: none  Tests/Procedures for NEXT shift: none  Consults? (Pending/following, signed-off?) heme/onc following, pharmacy liaison consult, PT following, SW following  Where is patient from? (Home, TCU, etc.): custodial, lives with family  Other Important info for NEXT shift: family  involved in pt's care  Anticipated DC date & active delays: Pending improvement in symptoms and safe discharge disposition  _____________________________________________________________________________  SUMMARY NOTE:  Orientation/Cognitive:  A&O x 3, disoriented to place  Mobility Level/Assist Equipment: Assist x 2 to BSC  Antibiotics & Plan (IV/po, length of tx left): n/a  Pain Management: reports back pain with movement. Denies pain at rest. Scheduled tylenol and MS Contin  Tele/VS/O2: tele- SR  ABNL Lab/BG: hgb 8.5, , , 291, 193  Diet: regular   Bowel/Bladder: incontinent  Skin Concerns: right and left elbow abrasions covered with mepilex, scattered bruising  Drains/Devices: PIV infusing NS @ 100 ml/hr  Patient Stated Goal for Today: rest

## 2024-09-08 NOTE — PROGRESS NOTES
Care Management Follow Up    Length of Stay (days): 2    Expected Discharge Date: 09/09/2024     Concerns to be Addressed: discharge planning     Patient plan of care discussed at interdisciplinary rounds: Yes    Anticipated Discharge Disposition:                Anticipated Discharge Services:    Anticipated Discharge DME:      Patient/family educated on Medicare website which has current facility and service quality ratings: yes  Education Provided on the Discharge Plan:    Patient/Family in Agreement with the Plan: other (see comments) (PT recomending TCU, family not on board)    Referrals Placed by CM/SW:    Private pay costs discussed: Not applicable    Discussed  Partnership in Safe Discharge Planning  document with patient/family: No     Handoff Completed: No, handoff not indicated or clinically appropriate    Additional Information:  Writer was informed by nursing staff of patient's son wanting to speak with the SW.     Writer stopped by the room while oncologist was finishing conversing with the patient and family. Oncologist was mentioning hospice during the conversation. Afterwards, patient's son, Rey, Inquired with the writer about about that would be the next possible steps and what the outcome would be of those steps. Rey mentioned he would like to look at TCU, home with home care([facility]), and lastly, home with hospice.     Writer stated currently, the patient was recommended TCU by our PT team. Patient son stated they would prefer to return back home, however, they would be agreeable to 2 facilities, [facility] and [facility]. Writer was unaware of patient's current mobility status or baseline back at [facility]. However, writer stated most ERICK's only provide up to an Assist of 1. Writer stated that if the patient was beyond that level of assistance, most often ERICK's would request the patient to go to a TCU before returning. Patient's son stated the patient has been to 4-5 TCU's in the past 2  years and they have had negative experiences. Writer stated understanding. Writer stated he cannot guarantee if [facility] or [facility] would accept or if they even have availability.     Rey discussed about going home with home care. Writer stated that most home care agencies would follow a patient for 1-2 a week, depending on the needs of the patient. However, the plan for home would only be available again if the ERICK would be able to accommodate with those additional services.     Writer stated for home with hospice would be no different circumstance than going home with home care. It would depend on the ERICK if they can accommodate to the patient's needs with the additional services. However, with hospice, the hospice agency would be able to order additional equipment, such as a hospital bed or Dylan lift. However, it all comes down to the senior living.    Son did not seem satisfied with the conversation as he was still questioning on what steps to take. Writer stated he can have the provider place a palliative consult to discuss with him about the patient's current needs and see what would be appropriate.      spoke with bedside rn who confirmed the provider placed a palliative consult. Palliative not on during the weekend, will hopefully see tomorrow.      will send an email to the patient's son with each option, but will defer to palliative to help detail the patient's current needs.      Addendum 1618:  Eliotr spoke with nursing staff and therapy team. Patient is a heavy assist which would mean the patient would not be able to return back to senior living and would remain recommended TCU.      contacted the patient's son, Rey ([phone]). Writer stated that it would be unlikely for the patient to be able to return back to senior living at the current level of assist he is at. A palliative consult was also placed, however, to determine the medical needs and expectations moving forward with the patient.   stated that if the patient was medically ok to be at a TCU, they would move forward with that route. However, if the patient might not benefit from TCU. They would need to look at a higher level of care for this patient. Potentially LTC. Rey stated if this was the plan, they would like to focus more then on [facility] as they have a LTC. However, he does not want to separate patient from spouse. Rey inquired if we would look into LTC there and see if they have availability now. Rey stated In the past they tried to get both patient and spouse placed their, however, there was a 2 year waiting list. Writer stated he will send out referrals, but will alert the SW for tomorrow to follow up.    Next Steps:  Palliative to see.     ERIBERTO Ruiz  [facility]  Social Work

## 2024-09-09 NOTE — PLAN OF CARE
Goal Outcome Evaluation:      Plan of Care Reviewed With: patient    Overall Patient Progress: improvingOverall Patient Progress: improving    PRIMARY Concern: Fall  SAFETY RISK Concerns (fall risk, behaviors, etc.): Fall risk      Isolation/Type: NA  Tests/Procedures for NEXT shift: Head CT  Consults? (Pending/following, signed-off?) Neurosurgery and palliative following  Where is patient from? (Home, TCU, etc.): Lives at [facility]   Other Important info for NEXT shift: Unable to orthostatic BP d/t using lift with ambulation . Neuros intact ex year, pt refused to do the heel to toe pt was annoyed with all the commands  Anticipated DC date & active delays: TBD    SUMMARY NOTE:  Orientation/Cognitive: A&O to x3 disoriented to time did not know the year  Observation Goals (Met/ Not Met): INP  Mobility Level/Assist Equipment: Ax2 LIft  Antibiotics & Plan (IV/po, length of tx left): NA  Pain Management: scheduled morphine and tylenol   Tele/VS/O2: VSS ex tachy  ABNL Lab/BG: B Hgb:7.9 CK:778  Diet: regular needs assistance with feeds  Bowel/Bladder: Incontinent of urine  Skin Concerns: Abrasion to bilat elbows, scattered bruising   Drains/Devices: PIV infusing NS@100ml/hr  Patient Stated Goal for Today: To sit in the chair

## 2024-09-09 NOTE — PROGRESS NOTES
Consult received    78 year old NOT anticoagulated with history of metastatic prostate cancer with confusion and brain MRI evidence below - small thin BL SDH     Plan  No urgent neurosurgical intervention indicated    Neuro checks q4H   HOB30  SBP<150  Rpt head CT tomorrow AM     Nsgy consult in AM    Dr. Alcantar in agreement     Dayana Lopez PA-C  [facility] Neurosurgery  [address]  [address]  [place] [address]  Tel [phone]  Fax [phone]  Text page via [facility] Paging/Directory      EXAM: MR BRAIN W/O and W CONTRAST  LOCATION: [facility]  DATE: 9/8/2024     INDICATION: Neurological symptoms, stroke.  COMPARISON: Head CT 9/5/2024.  CONTRAST: 7 mL Sam  TECHNIQUE: Routine multiplanar multisequence head MRI without and with intravenous contrast.     FINDINGS:  Thin subacute subdural hematomas along the bilateral convexities, left larger than right. The larger left convexity subdural hematoma measures up to about 3 mm in thickness. Questionable thin subdural hemorrhage along the falx. Associated presumed   reactive dural thickening and enhancement. Probable subtle correlate subdural hematoma along the left convexity in retrospect on the prior head CT. No high-grade mass effect.     No convincing acute infarct. Questionable subtle diffusion changes within the felice (for example, series 602 image 10) which may be artifactual. Volume loss is present with scattered nonspecific white matter T2 hyperintensities likely representing chronic   small vessel ischemic change. 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     Bone marrow demonstrates diffuse less than typically expected patchy areas of T1 hypointense signal with areas of T2 hyperintense signal and enhancement most conspicuous involving the left parietal bone with mild paranasal sinus mucosal thickening.   Small-volume fluid signal scattered throughout the bilateral mastoid cavities.                                                                       IMPRESSION:  1.  Thin subacute subdural hematomas along the bilateral convexities, left larger than right. No high-grade mass effect.  2.  Associated dural thickening and enhancement, nonspecific, likely reactive.  3.  No convincing acute infarct.  4.  Questionable subtle diffusion changes within the felice which may be artifactual (for example series 602 image 10).  5.  Old left occipital infarct.  6.  Known widespread osseous metastatic disease.

## 2024-09-09 NOTE — PROGRESS NOTES
"Care Management Follow Up    Length of Stay (days): 3    Expected Discharge Date: 09/10/2024     Concerns to be Addressed: discharge planning     Patient plan of care discussed at interdisciplinary rounds: Yes    Anticipated Discharge Disposition:       Anticipated Discharge Services:    Anticipated Discharge DME:      Patient/family educated on Medicare website which has current facility and service quality ratings: yes  Education Provided on the Discharge Plan:    Patient/Family in Agreement with the Plan: other (see comments) (PT recomending TCU, family not on board)    Referrals Placed by CM/SW:    Private pay costs discussed: Not applicable    Discussed  Partnership in Safe Discharge Planning  document with patient/family: No     Handoff Completed: No, handoff not indicated or clinically appropriate    Additional Information:  Patient has been declined by both TCU referrals to [facility] and [facility].    Call to [facility] [phone]), spoke with TREVOR Ha, discussed patients current needs. Leland states pt is from their [facility], sounds very below baseline, typically ambulates with a walker. Leland states the do have \"wonderful\" hospice at their facility. SW indicated we are not sure on restorative vs. Comfort yet, as palliative is consulted, but per chart review it appears pt is wanting a restorative plan at this time. Leland believes they do have malcolm lifts in their building, she requested to call SW back in about 10 minutes to discuss further. Received call back from [facility] (direct number is [phone] ext [phone]). Leland states they can accommodate assist of 2 and malcolm lifts in their building. They are a [facility] building and do have [facility] who can do home care if needed. Leland states they do have a sister community, [facility], that does TCU. Leland understands there may be barriers with pt going to TCU with current prognosis, but it will all depend on pt's goals. Will need to " keep Leland updated on plans.     Left  for son Rey.     Addendum 1630: received call back from Rey, who conference called in sister Nida. Discussed discharge planning, updated on TCU referral declines. Family are wanting a restorative approach. They would like pt to return home as soon as possible, state this is going to be the best plan for pt cognitively and to be around spouse. They want pt to be comfortable and have as much time at home as possible but also want pt to be strong and would like [facility] home care to be involved, they don't see much of a difference between home care and TCU services at this point. They still have some interest in palliative and agree to an outpatient consult, as they don't want this to hold up discharge. They understand SW will connect with all involved and update them tomorrow on plans.   Called Leland from [facility], informed her on above conversation. She is agreeable for pt to return to [facility] tomorrow with home care. They need discharge orders faxed to: [phone], attn: Leland, and send any new medications to [facility] pharmacy in [place]. RN/PT/OT/HHA will need to be on discharge orders. They prefer pt back earlier rather than later, latest being about 1600. Updated hospitalist. Scheduled [facility] stretcher transport (need for supervision) for tomorrow, 9/10 between 0133-0305. Patient has medical assistance so transport should be covered with insurance. RNCC already sent home care referral to [facility], who accepted.     Sandy West, ERIBERTO  Social Work  [facility]

## 2024-09-09 NOTE — PROGRESS NOTES
"Palliative consult received. Due to palliative consult volume received in the last 24hrs and palliative census volume/acuity, patient will not be evaluated by our team until Tuesday 9/10 or Wednesday 9/11. Thanks.    Maxine FLORES, AB  Palliative Medicine   [facility]  Securely message with [employer]   *If you are having trouble locating my name, please ensure \"[facility], [facility], and [facility]\" is checked under contacts tab, within the sites button. My name will be listed as Angela Stringer.  "

## 2024-09-09 NOTE — PLAN OF CARE
Goal Outcome Evaluation:  PRIMARY Concern: Fall  SAFETY RISK Concerns (fall risk, behaviors, etc.): Fall risk      Isolation/Type: NA  Tests/Procedures for NEXT shift: Head CT  Consults? (Pending/following, signed-off?) Neurosurgery and palliative following  Where is patient from? (Home, TCU, etc.): Lives at [facility]   Other Important info for NEXT shift: . Neuros intact except pt unable to do the heel to toe   Anticipated DC date & active delays: TBD     SUMMARY NOTE:  Orientation/Cognitive: A&O to x3 disoriented to sitaution  Observation Goals (Met/ Not Met): INP  Mobility Level/Assist Equipment: Ax2 LIft  Antibiotics & Plan (IV/po, length of tx left): NA  Pain Management: scheduled morphine and tylenol  with PRN oxycodone available  Tele/VS/O2: VSS ex tachy 106  ABNL Lab/BG:  Hgb:7.9 CK:778  Diet: regular needs assistance with feeding  Bowel/Bladder: Incontinent of urine  Skin Concerns: Abrasion to bilat elbows, scattered bruising   Drains/Devices: PIV infusing NS@100ml/hr  Patient Stated Goal for Today: rest

## 2024-09-09 NOTE — CONSULTS
Neurosurgery Consult    Assessment  No acute SDH  Numerous osseous lesions with known prostate cancer followed by Oncology.    Plan:  No need for Neurosurgery follow up.  Neurosurgery will sign off.      HPI    From the ER note as patient unable to give history:    Migue [place] is a 78 year old male with a history of metastatic prostate cancer presenting to us because of generalized weakness and falls.  Wife notes that he has been slowly getting worse over the past couple of weeks with increasing confusion, weakness, and poor oral intake.  She also notes that she is having more more challenges helping to get out of a chair or out of bed and is feeling as though she is unable to care for him in his current situation.     In the night, he appears to have gotten up and falling in the kitchen.  He does not recall why he was in the kitchen or recall the fall itself.  He was unable to get up through the night and eventually was found this morning by an aide.  He did denies suffering any serious trauma, having no complaints of pain at this time.  However, EMS found him to be significantly hypotensive and tachycardic on scene.  In fact, he was initially in atrial fibrillation, but self converted en route to the hospital.    Medical history  Metastatic prostate cancer  A. Fib  Depression   Anxiety  Chronic pain    Exam  B/P: 122/67, T: 98.4, P: 109, R: 16   He's awake, orientated to person.  Able to move all four extremities but does not follow with specific strength testing.  GCS 15      Imaging    Head CT 9/5/24  IMPRESSION:     1. No acute intracranial hemorrhage, mass effect, or herniation.  2. Mottled appearance of the left parietal bone concerning for osseous  metastases. This is more conspicuous since 9/3/2023.    Brain MRI 9/8/24  IMPRESSION:  1.  Thin subacute subdural hematomas along the bilateral convexities, left larger than right. No high-grade mass effect.  2.  Associated dural thickening and enhancement,  nonspecific, likely reactive.  3.  No convincing acute infarct.  4.  Questionable subtle diffusion changes within the felice which may be artifactual (for example series 602 image 10).  5.  Old left occipital infarct.  6.  Known widespread osseous metastatic disease.    Head IMPRESSION:     1. No acute intraparenchymal hemorrhage, mass effect, or herniation.  2. Bony metastases with mottled appearance of the calvarium, better  appreciated on MR. Extra-axial dural thickening along the left  cerebral convexity may relate to neoplasm versus less likely small  amount of subdural blood. This is much better appreciated by MR.  Previously seen right-sided extra-axial collection is not appreciated  by head CT.CT 9/9/24    Discussed with Dr. Alcantar.    Adrianna Morales, FENGS  [facility] Neurosurgery  [address] [address]    Tel [phone]  Pager [phone]

## 2024-09-09 NOTE — PROGRESS NOTES
[facility]    Hospitalist Progress Note    Interval History     Patient is awake and alert.  Wife at bedside.  He is quite deconditioned and sleepy but arousable to verbal stimuli.  Oriented to place and person but not to time.  Continues to have difficulty with finding words and persistent confusion.  Spoke to son over the phone.    -Data reviewed today: I reviewed all new labs and imaging results over the last 24 hours. I personally reviewed the CT head and CT chest for PE image(s) showing no acute findings .  Does have chronic malignancy associated findings.  Review report for complete details.  No acute PE or stroke or intracranial bleed.    Physical Exam   Temp: 98.4  F (36.9  C) Temp src: Oral BP: 122/67 Pulse: 109   Resp: 16 SpO2: 94 % O2 Device: None (Room air)    Vitals:    09/07/24 0226 09/08/24 0633   Weight: 69.4 kg (153 lb) 71.5 kg (157 lb 10.1 oz)     Vital Signs with Ranges  Temp:  [97.7  F (36.5  C)-98.4  F (36.9  C)] 98.4  F (36.9  C)  Pulse:  [100-109] 109  Resp:  [16] 16  BP: (122-143)/(67-83) 122/67  SpO2:  [93 %-96 %] 94 %  I/O last 3 completed shifts:  In: 3180.33 [P.O.:400; I.V.:2780.33]  Out: -     Physical Exam  Constitutional:       Appearance: He is ill-appearing.   Cardiovascular:      Rate and Rhythm: Regular rhythm. Tachycardia present.      Pulses: Normal pulses.      Heart sounds: Normal heart sounds.   Pulmonary:      Effort: Pulmonary effort is normal. No respiratory distress.      Breath sounds: Normal breath sounds.   Abdominal:      General: Abdomen is flat. Bowel sounds are normal. There is no distension.      Tenderness: There is no abdominal tenderness. There is no guarding.   Skin:     General: Skin is warm and dry.      Coloration: Skin is pale.   Neurological:      General: No focal deficit present.      Comments: Not oriented to time but is oriented to place and person.  Very slow to answer questions.  Intermittently confused.  Per wife this is  significantly worse than baseline.  Following commands.  Moving all extremities but very slow to move.           Medications   Current Facility-Administered Medications   Medication Dose Route Frequency Provider Last Rate Last Admin     Current Facility-Administered Medications   Medication Dose Route Frequency Provider Last Rate Last Admin    acetaminophen (TYLENOL) tablet 1,000 mg  1,000 mg Oral TID Tan Gu PA-C   1,000 mg at 09/09/24 0914    DULoxetine (CYMBALTA) DR capsule 20 mg  20 mg Oral BID Tan Gu PA-C   20 mg at 09/09/24 0914    insulin aspart (NovoLOG) injection (RAPID ACTING)  1-7 Units Subcutaneous TID AC Tan Gu PA-C   2 Units at 09/09/24 1300    insulin aspart (NovoLOG) injection (RAPID ACTING)  1-5 Units Subcutaneous At Bedtime Tan Gu PA-C   2 Units at 09/08/24 2159    [Held by provider] LORazepam (ATIVAN) tablet 0.5 mg  0.5 mg Oral BID Tan Gu PA-C   0.5 mg at 09/06/24 0917    melatonin tablet 3 mg  3 mg Oral At Bedtime Tan Gu PA-C   3 mg at 09/07/24 2254    morphine (MS CONTIN) 12 hr tablet 15 mg  15 mg Oral Q12H [facility] (08/20) Beth Taylor MD   15 mg at 09/09/24 0914    polyethylene glycol (MIRALAX) Packet 17 g  17 g Oral Daily Tan Gu PA-C   17 g at 09/08/24 0817    predniSONE (DELTASONE) tablet 5 mg  5 mg Oral BID Tan Gu PA-C   5 mg at 09/09/24 0913    senna-docusate (SENOKOT-S/PERICOLACE) 8.6-50 MG per tablet 2 tablet  2 tablet Oral Daily Tan Gu PA-C   2 tablet at 09/09/24 0915    sertraline (ZOLOFT) tablet 100 mg  100 mg Oral QAM Tan Gu PA-C   100 mg at 09/09/24 0914    sodium chloride (PF) 0.9% PF flush 3 mL  3 mL Intracatheter Q8H Tan Gu PA-C   3 mL at 09/07/24 2042       Data   Recent Labs   Lab 09/09/24  1233 09/09/24  0916 09/09/24  0857 09/09/24  0258 09/08/24  1700 09/08/24  1503  09/07/24 2258 09/07/24  1815 09/06/24  0832 09/06/24  0802 09/05/24  1810 09/05/24  1150   WBC  --  4.5  --   --   --  4.8  --  4.5  --  4.9  --  6.1   HGB  --  8.3*  --   --   --  7.9*  --  8.5*  --  9.4*  --  9.3*   MCV  --  90  --   --   --  91  --  93  --  91  --  91   PLT  --  159  --   --   --  138*  --  142*  --  157  --  145*   NA  --   --   --   --   --   --   --   --   --  134*  --  134*   POTASSIUM  --   --   --   --   --   --   --   --   --  3.7  --  4.1   CHLORIDE  --   --   --   --   --   --   --   --   --  101  --  100   CO2  --   --   --   --   --   --   --   --   --  22  --  23   BUN  --   --   --   --   --   --   --   --   --  7.3*  --  13.3   CR  --   --   --   --   --   --   --   --   --  0.41*  --  0.57*   ANIONGAP  --   --   --   --   --   --   --   --   --  11  --  11   MARKELL  --   --   --   --   --   --   --   --   --  8.2*  --  8.4*   *  --  163* 163*   < >  --    < >  --    < > 157*   < > 187*   ALBUMIN  --   --   --   --   --   --   --   --   --  2.9*  --  3.2*   PROTTOTAL  --   --   --   --   --   --   --   --   --  6.5  --  6.7   BILITOTAL  --   --   --   --   --   --   --   --   --  0.2  --  0.3   ALKPHOS  --   --   --   --   --   --   --   --   --  433*  --  352*   ALT  --   --   --   --   --   --   --   --   --  11  --  10   AST  --   --   --   --   --   --   --   --   --  162*  --  76*    < > = values in this interval not displayed.       Recent Results (from the past 24 hour(s))   MR Brain w/o & w Contrast    Narrative    EXAM: MR BRAIN W/O and W CONTRAST  LOCATION: [facility]  DATE: 9/8/2024    INDICATION: Neurological symptoms, stroke.  COMPARISON: Head CT 9/5/2024.  CONTRAST: 7 mL Sam  TECHNIQUE: Routine multiplanar multisequence head MRI without and with intravenous contrast.    FINDINGS:  Thin subacute subdural hematomas along the bilateral convexities, left larger than right. The larger left convexity subdural hematoma measures up to about 3 mm  in thickness. Questionable thin subdural hemorrhage along the falx. Associated presumed   reactive dural thickening and enhancement. Probable subtle correlate subdural hematoma along the left convexity in retrospect on the prior head CT. No high-grade mass effect.    No convincing acute infarct. Questionable subtle diffusion changes within the felice (for example, series 602 image 10) which may be artifactual. Volume loss is present with scattered nonspecific white matter T2 hyperintensities likely representing chronic   small vessel ischemic change. 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    Bone marrow demonstrates diffuse less than typically expected patchy areas of T1 hypointense signal with areas of T2 hyperintense signal and enhancement most conspicuous involving the left parietal bone with mild paranasal sinus mucosal thickening.   Small-volume fluid signal scattered throughout the bilateral mastoid cavities.      Impression    IMPRESSION:  1.  Thin subacute subdural hematomas along the bilateral convexities, left larger than right. No high-grade mass effect.  2.  Associated dural thickening and enhancement, nonspecific, likely reactive.  3.  No convincing acute infarct.  4.  Questionable subtle diffusion changes within the felice which may be artifactual (for example series 602 image 10).  5.  Old left occipital infarct.  6.  Known widespread osseous metastatic disease.   CT Head w/o Contrast    Narrative    CT SCAN OF THE HEAD WITHOUT CONTRAST September 9, 2024 8:50 AM     HISTORY: Followup [facility].    TECHNIQUE: Axial images of the head and coronal reformations without  IV contrast material. Radiation dose for this scan was reduced using  automated exposure control, adjustment of the mA and/or kV according  to patient size, or iterative reconstruction technique.    COMPARISON: Brain MR 9/8/2024. Head CT 9/5/2024.    FINDINGS: Images are mildly degraded by motion artifact. No acute  intraparenchymal hemorrhage  appreciated. No mass effect or midline  shift. Mild diffuse parenchymal volume loss. Patchy periventricular  white matter hypodensities are nonspecific, but most likely related to  chronic microvascular ischemic disease.    Mottled appearance of the calvarium compatible with osseous metastatic  disease, better seen on CT. Small left mastoid effusion likely near a  lytic lesion in that area. Mild extra-axial soft tissue along the  subdural space on the left could represent dural thickening or  subdural hematoma. No significant right-sided extra-axial fluid  collection appreciated by CT although this may be due to differences  in technique.    Mild mucosal thickening in the maxillary sinuses.       Impression    IMPRESSION:     1. No acute intraparenchymal hemorrhage, mass effect, or herniation.  2. Bony metastases with mottled appearance of the calvarium, better  appreciated on MR. Extra-axial dural thickening along the left  cerebral convexity may relate to neoplasm versus less likely small  amount of subdural blood. This is much better appreciated by MR.  Previously seen right-sided extra-axial collection is not appreciated  by head CT.    CALEB URENA MD         SYSTEM ID:  [id number]           Assessment & Plan      [place] is a 78 year old male registered to short-stay/observation on 9/5/2024 due to fall, new onset atrial fib, hypotension.       Past medical history significant for HTN, HLP, DM2, MDD with anxiety, Insomnia, Metastatic prostate cancer, Chronic pain, Cognitive impairment, Chronic anemia, Slow transit constipation.     Patient presented to the ED via ambulance from home.  Patient's wife reported that the patient has been dizzy and has had several falls over the past few days.  EMS reported that the patient was hypotensive and was in atrial fib.       Work-up in the ED included an EKG that revealed sinus rhythm with PACs, RSR' and prolonged QTc.  CMP revealed a sodium of 134, creatinine of 0.57  with a GFR greater than 90, calcium of 8.4, albumin of 3.2, alkaline phosphatase of 352, AST of 76, glucose of 187 otherwise unremarkable.  CBC with differential revealed a hemoglobin of 9.3, hematocrit of 29.4, platelet count of 145, RBC count of 3.23, RDW of 15.3, absolute lymphocytes of 0.7 otherwise unremarkable.  High-sensitivity troponin T was within normal limits at 17.  Total CK was elevated at 741.     Patient received IV fluid bolus 500 cc while in the ED.       Per report from Dr. Trierweiler, patient had a fall the night prior to presentation and remained on the floor for ~ 3 hours.  Family was unable to get the patient up off the floor and led to EMS being contacted.  Upon assessment by EMS patient was hypotensive (80/50) and in atrial fib with RVR (HR in the 150's).  En route to [facility] ED patient spontaneously converted back to normal sinus rhythm.  While in the ED,  patient's family reported that the patient has not been eating, drink fluids and has been falling frequently the past few days.       Fall   Bilateral elbow abrasions  Mild rhabdomyolysis  Orthostatic hypotension  Metabolic encephalopathy  -Head CT shows no acute hemorrhage, mass effect or herniation.  Mottled appearance of the left bilateral bone concerning for osseous serious mets more conspicuous compared to prior imaging.  -CT chest for PE negative for pulmonary embolism but did show possible mild bronchitis.  Interval enlargement and mediastinal and hilar lymphadenopathy with mixed lytic and sclerotic osseous mets.  -Urinalysis negative for infection but did show some glucose and trace ketones.  -Was treated with IV normal saline at 100 cc an hour  - ---1198--778  -Creatinine kinase is still pending on 9/9/2024.  Discontinued IV fluids on 9 and 24.  Encourage oral intake.  - Orthostatic significantly positive with blood pressure dropping to 80/48 on standing up.  Patient was quite symptomatic.  1 L normal saline bolus given.   Recheck improved.  Stopped IV normal saline on 9/9/2024.  -Patient is more confused than usual.  Reduced morphine dose to twice daily and held home lorazepam.    -PT OT consulted.  Recommending TCU.  -Patient currently living in an assisted living facility.  Needing 3 person assist to get from bed to chair.  Will need TCU placement.  Social work consulted.  -Palliative care consulted.    Thin subacute subdural hematomas  -Initial head CT did not show any intracranial bleed but MRI brain was done on 9/8/2024 due to persistent altered mentation.  -MRI shows thin subacute subdural hematomas along bilateral convexities with left greater than right with no mass effect.  Associated dural thickening and enhancement.  Old left occipital infarct.  Known widespread disease.  -Neurosurgery consulted.  No new recommendations.  No need for surgical interventions.  Repeat CT is stable.  -Neurosurgery signed off.     New onset atrial fib with RVR (Resolved-spontaneous conversion back to NSR)  - Monitor on telemetry.    - ECHO done on 9/5/2024 shows EF 55 to 60% with no regional wall motion abnormalities or valvular disease.  -TSH 0.65, magnesium 1.8   - Hold off on starting anticoagulation therapy at this time due to frequent falls and acute on chronic anemia.    -Pulled off his telemetry .  Has been persistently tachycardic.  EKG shows sinus tach with PACs.  Continue to monitor for now.     Hypotension (Resolved)  History of HTN  *Suspected secondary to dehydration and has improved following spontaneous conversion back to NSR and IV fluids.    *Previously on amlodipine but was discontinued 10/2023.    - IV fluids as above.    - Vital signs every 4 hours.       Failure to thrive  Hypoalbuminemia  - IV fluids as above.    - PT consult as above.    -Nutrition services consulted.  Concern for significant malnutrition.  - ensure supplements ordered     Acute on chronic anemia  Mild thrombocytopenia  *Baseline HGB between 11-12.5.   Currently at 8.5  -Continue to monitor.  No signs of active bleeding.  -Thrombocytopenia resolved at this time     Pseudohyponatremia  Sodium of 134 and corrects to 136 due to glucose level of 187.    -Continue to monitor     HLP  *No longer on statin therapy.       Elevated alk phos level  *Suspect secondary to dehydration and metastatic prostate cancer.    - IV fluids.    - Hepatic panel shows elevated AST and normal ALT.  Elevated alk phos.  Likely secondary to prostate cancer.     Type 2 DM  Hyperglycemia  PTA regimen includes: Metformin 1000 mg daily with supper.  -Hemoglobin A1c at 7.6  - Hold PTA metformin.  Resume at discharge.    - Medium intensity sliding scale insulin ordered.  - Glucose checks ordered.    - Hypoglycemic protocol in place.       MDD with anxiety  - Resumed on PTA Cymbalta 20 mg BID and Zoloft 100 mg/d.    - Resumed on PTA Ativan 0.5 mg BID and PRN 0.5 mg BID.       Insomnia  - Resumed on PTA PRN melatonin 3 mg at bedtime.       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  Follows with [facility].  Completed 10 sessions of chemo ~ 1 month ago and had planned to have 3-4 month break before restarting treatment, however PSA level significantly increased and he will be starting Pluvicto soon.    - Resumed on scheduled APAP 1000 mg TID.    - Reduced PTA to MS Contin 15 mg bid and PRN 15 mg daily for sever pain.    - Resumed on PTA PRN oxycodone 10 mg every 4 hours.    - Resumed on PTA prednisone 5 mg BID.    -Oncology team has been consulted.  Reviewed recommendations.  Plan on having radiation oncology do a virtual consult with the patient and family to arrange for outpatient treatment.  -Family did request to talk to palliative care.  Consult placed.     Cognitive impairment   *SLUMS score of 9/30 from 9/8/2023.       Slow transit constipation  - Bowel medications available.       Clinically Significant Risk Factors               # Hypoalbuminemia: Lowest albumin = 2.9 g/dL at 9/6/2024  8:02 AM, will monitor as appropriate                # DMII: A1C = 7.6 % (Ref range: <5.7 %) within past 6 months, PRESENT ON ADMISSION      # Financial/Environmental Concerns: none           DVT Prophylaxis: Pneumatic Compression Devices  Code Status: Full Code  Medically Ready for Discharge: Anticipated Tomorrow    Patient patient would be medically stable for discharge once his CK is improved.  Likely in the next 24 to 48 hours.  Social work following for TCU placement.  Pending palliative consult.    Greater than 35 minutes spent on documentation review, lab review, imaging review, examining the patient and discussing care    Beth Taylor MD, MD  [phone](p)

## 2024-09-09 NOTE — PROGRESS NOTES
MD Notification    Notified Person: MD    Notified Person Name: Dr. Taylor    Notification Date/Time: 09/09, at 16:9 pm.    Notification Interaction: [employer] messaging.     Purpose of Notification:  Patient reported chest pain below left breast, said it happens when he breathes in.     Orders Received: No new orders.     Comments:  Provider wants us to utilize pain medications.

## 2024-09-09 NOTE — PROGRESS NOTES
[facility] H[facility]ology Progress Note     Primary Oncologist/Hematologist:  Siddharth Herbert          Assessment and Plan:     Assessment and Plan:  Arash is an 78 year old with metastatic prostate cancer admitted with progressive weakness     Metastatic prostate cancer  - Below is from a recent office note by Dr. Damon on 8/26/2024  - The patient has metastatic castration resistant prostate cancer, with lymph node and bone metastases.    - Completed 10 cycles of Taxotere/prednisone in June 2024.  - He remains on leuprolide, next 1/2025  - PSA doubled in a month after finishing Taxotere.  Now with worsening low back pain likely due to disease progression  - Disease is positive on PSMA PET scan  - Dr. Damon discussed Pluvicto versus cabazitaxel versus hospice/comfort care.  Patient is interested in trying Pluvicto.  Discussed efficacy and potential side effects  - He will need to get a little stronger before we can try additional therapy even if this means he needs to go to TCU  - He has been referred to Radiation Oncology to discuss Pluvicto. This is not something that would be initiated as an inpatient, therefore it can be deferred until after discharge.  - Dr. Damon has reached out to Dr. Devine ( radiation) - to consider Pluvicto.     PLAN:  - rehab and strengthening  - Pluvicto would be an OP treatment, not given in the hospital  - Dr. Spivey d/w son over the weekend and they would like to get the ball rolling on insurance approval for Pluvicto and a virtual visit with Dr. Devine. Our office is working on this.      Weakness  Falls  - Likely a combination of progressive disease and treatment, but also poor intake  - Work with PT/OT  - Work on nutrition and hydration  - Continue to explore goals of care, but right now he'd like to consider more treatment     PLAN: d/w Migue and his wife Blanca today about TCU. Noted SW input also  - over the weekend, son asked about goals of care- hospice discussed, no  decisions made  - Palliative also consulted- they are reviewing all options including HHC, TCU and lastly palliative     Changes in mental status  - drugs/ sun downing.  - MRI shows thin subacute subdural hematomas along the bilateral convexities, left larger than right. No high-grade mass effect.   - neurosurgery following. No urgent neurosurgical intervention indicated.  - Repeat head CT 9/9 shows extra-axial dural thickening along the left cerebral convexity may relate to neoplasm versus less likely small amount of subdural blood. This is much better appreciated by MR. Previously seen right-sided extra-axial collection is not appreciated by head CT.       SANDRA Pina, AOCNP  Nurse Practitioner  [facility]  [phone]          Interval History:     Discussed with wife Blanca at bedside.  Migue knows he is confused and very weak.  It takes 2-3 to get him up out of bed.  Back pain is stable.               Review of Systems:     The 5 point Review of Systems is negative other than noted in the HPI                Medications:   Scheduled Medications  Current Facility-Administered Medications   Medication Dose Route Frequency Provider Last Rate Last Admin    acetaminophen (TYLENOL) tablet 1,000 mg  1,000 mg Oral TID Tan Gu PA-C   1,000 mg at 09/09/24 0914    DULoxetine (CYMBALTA) DR capsule 20 mg  20 mg Oral BID Tan Gu PA-C   20 mg at 09/09/24 0914    insulin aspart (NovoLOG) injection (RAPID ACTING)  1-7 Units Subcutaneous TID AC Tan Gu PA-C   2 Units at 09/08/24 1748    insulin aspart (NovoLOG) injection (RAPID ACTING)  1-5 Units Subcutaneous At Bedtime Tan Gu PA-C   2 Units at 09/08/24 2159    [Held by provider] LORazepam (ATIVAN) tablet 0.5 mg  0.5 mg Oral BID Tan Gu PA-C   0.5 mg at 09/06/24 0917    melatonin tablet 3 mg  3 mg Oral At Bedtime Tan Gu PA-C   3 mg at 09/07/24 0111     morphine (MS CONTIN) 12 hr tablet 15 mg  15 mg Oral Q12H [facility] (08/20) Beth Taylor MD   15 mg at 09/09/24 0914    polyethylene glycol (MIRALAX) Packet 17 g  17 g Oral Daily Tan Gu PA-C   17 g at 09/08/24 0817    predniSONE (DELTASONE) tablet 5 mg  5 mg Oral BID Tan Gu PA-C   5 mg at 09/09/24 0913    senna-docusate (SENOKOT-S/PERICOLACE) 8.6-50 MG per tablet 2 tablet  2 tablet Oral Daily Tan Gu PA-C   2 tablet at 09/09/24 0915    sertraline (ZOLOFT) tablet 100 mg  100 mg Oral QAM Tan Gu PA-C   100 mg at 09/09/24 0914    sodium chloride (PF) 0.9% PF flush 3 mL  3 mL Intracatheter Q8H Tan Gu PA-C   3 mL at 09/07/24 2042     PRN Medications  Current Facility-Administered Medications   Medication Dose Route Frequency Provider Last Rate Last Admin    acetaminophen (TYLENOL) tablet 650 mg  650 mg Oral Q4H PRN Tan Gu PA-C        Or    acetaminophen (TYLENOL) Suppository 650 mg  650 mg Rectal Q4H PRN Tan Gu PA-C        bisacodyl (DULCOLAX) suppository 10 mg  10 mg Rectal Daily PRN Tan Gu PA-C        glucose gel 15-30 g  15-30 g Oral Q15 Min PRN Tan Gu PA-C        Or    dextrose 50 % injection 25-50 mL  25-50 mL Intravenous Q15 Min PRN Tan Gu PA-C        Or    glucagon injection 1 mg  1 mg Subcutaneous Q15 Min PRN Tan Gu PA-C        HYDROmorphone (DILAUDID) injection 0.2 mg  0.2 mg Intravenous Q2H PRN Tan Gu PA-C        HYDROmorphone (DILAUDID) injection 0.4 mg  0.4 mg Intravenous Q2H PRN Tan Gu PA-C        lidocaine (LMX4) cream   Topical Q1H PRN Tan Gu PA-C        lidocaine 1 % 0.1-1 mL  0.1-1 mL Other Q1H PRN Tan Gu PA-C        LORazepam (ATIVAN) tablet 0.5 mg  0.5 mg Oral BID PRN Tan Gu PA-C        morphine (MS  CONTIN) 12 hr tablet 15 mg  15 mg Oral Daily PRN Tan Gu PA-C        naloxone (NARCAN) injection 0.2 mg  0.2 mg Intravenous Q2 Min PRN Tan Gu PA-C        Or    naloxone (NARCAN) injection 0.4 mg  0.4 mg Intravenous Q2 Min PRN Tan Gu PA-C        Or    naloxone (NARCAN) injection 0.2 mg  0.2 mg Intramuscular Q2 Min PRN Tan Gu PA-C        Or    naloxone (NARCAN) injection 0.4 mg  0.4 mg Intramuscular Q2 Min PRN Tan Gu PA-C        oxyCODONE (ROXICODONE) tablet 10 mg  10 mg Oral Q4H PRN Tan Gu PA-C   10 mg at 09/08/24 0633    polyethylene glycol (MIRALAX) Packet 17 g  17 g Oral BID PRN Tan Gu PA-C        prochlorperazine (COMPAZINE) injection 5 mg  5 mg Intravenous Q6H PRN Tan Gu PA-C        Or    prochlorperazine (COMPAZINE) tablet 5 mg  5 mg Oral Q6H PRN Tan Gu PA-C        Or    prochlorperazine (COMPAZINE) suppository 12.5 mg  12.5 mg Rectal Q12H PRN Tan Gu PA-C        senna-docusate (SENOKOT-S/PERICOLACE) 8.6-50 MG per tablet 1 tablet  1 tablet Oral BID PRN Tan Gu PA-C        Or    senna-docusate (SENOKOT-S/PERICOLACE) 8.6-50 MG per tablet 2 tablet  2 tablet Oral BID PRN Tan Gu PA-C        sodium chloride (PF) 0.9% PF flush 3 mL  3 mL Intracatheter q1 min prn Tan Gu PA-C   3 mL at 09/07/24 1553                  Physical Exam:   Vitals were reviewed  Blood pressure 130/71, pulse 107, temperature 98.1  F (36.7  C), temperature source Oral, resp. rate 16, weight 71.5 kg (157 lb 10.1 oz), SpO2 93%.  Wt Readings from Last 4 Encounters:   09/08/24 71.5 kg (157 lb 10.1 oz)   10/09/23 78.5 kg (173 lb)   10/05/23 78.7 kg (173 lb 6.4 oz)   10/02/23 78.5 kg (173 lb)       I/O last 3 completed shifts:  In: 3180.33 [P.O.:400; I.V.:2780.33]  Out: -                Data:   All laboratory  data and imaging studies reviewed.    CMP  Recent Labs   Lab 09/09/24  0857 09/09/24  0258 09/08/24  2147 09/08/24  1700 09/06/24  0832 09/06/24  0802 09/05/24  1810 09/05/24  1150   NA  --   --   --   --   --  134*  --  134*   POTASSIUM  --   --   --   --   --  3.7  --  4.1   CHLORIDE  --   --   --   --   --  101  --  100   CO2  --   --   --   --   --  22  --  23   ANIONGAP  --   --   --   --   --  11  --  11   * 163* 266* 213*   < > 157*   < > 187*   BUN  --   --   --   --   --  7.3*  --  13.3   CR  --   --   --   --   --  0.41*  --  0.57*   GFRESTIMATED  --   --   --   --   --  >90  --  >90   MARKELL  --   --   --   --   --  8.2*  --  8.4*   MAG  --   --   --   --   --   --   --  1.8   PHOS  --   --   --   --   --   --   --  2.8   PROTTOTAL  --   --   --   --   --  6.5  --  6.7   ALBUMIN  --   --   --   --   --  2.9*  --  3.2*   BILITOTAL  --   --   --   --   --  0.2  --  0.3   ALKPHOS  --   --   --   --   --  433*  --  352*   AST  --   --   --   --   --  162*  --  76*   ALT  --   --   --   --   --  11  --  10    < > = values in this interval not displayed.     CBC  Recent Labs   Lab 09/09/24  0916 09/08/24  1503 09/07/24  1815 09/06/24  0802   WBC 4.5 4.8 4.5 4.9   RBC 2.98* 2.83* 2.92* 3.28*   HGB 8.3* 7.9* 8.5* 9.4*   HCT 26.7* 25.7* 27.0* 29.7*   MCV 90 91 93 91   MCH 27.9 27.9 29.1 28.7   MCHC 31.1* 30.7* 31.5 31.6   RDW 15.7* 15.7* 15.8* 15.4*    138* 142* 157     INRNo lab results found in last 7 days.        Mell FLORES, FLYP  Nurse Practitioner  [facility]  [phone]

## 2024-09-09 NOTE — PROGRESS NOTES
PRIMARY Concern: Fall  SAFETY RISK Concerns (fall risk, behaviors, etc.): Fall risk      Isolation/Type: NA  Tests/Procedures for NEXT shift: Head CT  Consults? (Pending/following, signed-off?) Neurosurgery and palliative following  Where is patient from? (Home, TCU, etc.): Lives at [facility]   Other Important info for NEXT shift: . Neuros intact except pt unable to do the heel to toe   Anticipated DC date & active delays: TBD     SUMMARY NOTE:  Orientation/Cognitive: A&O to x1 this morning.   Observation Goals (Met/ Not Met): Not met.   Mobility Level/Assist Equipment: Ax2 LIft  Antibiotics & Plan (IV/po, length of tx left): NA  Pain Management: scheduled morphine and tylenol  with PRN oxycodone available  Tele/VS/O2: VSS ex tachy 106  ABNL Lab/BG:  See chart, CT head done today.  Diet: regular, needs assistance with feeding  Bowel/Bladder: Incontinent of urine  Skin Concerns: Abrasion to bilat elbows, scattered bruising   Drains/Devices: PIV saline locked.   Patient Stated Goal for Today: rest

## 2024-09-10 NOTE — PLAN OF CARE
Goal Outcome Evaluation:      Plan of Care Reviewed With: patient    Overall Patient Progress: improving    PRIMARY Concern: Here with generalized weakness and fall  SAFETY RISK Concerns (fall risk, behaviors, etc.): Fall risk      Isolation/Type: NA  Tests/Procedures for NEXT shift:BS checks  Consults? (Pending/following, signed-off?) Neurosurgery/SW and palliative following  Where is patient from? (Home, TCU, etc.):[facility]   Other Important info for NEXT shift:Was to be seen by palliative team today but pt discharging this morning, seems family will pursue outpt palliative if still needed.  Anticipated DC date & active delays: Today-ride set up between [id number]     SUMMARY NOTE:  Orientation/Cognitive: A&O to self only, confused  Observation Goals (Met/ Not Met): Not met.   Mobility Level/Assist Equipment: Ax2 GB/W and sometimes lift depending on strength  Antibiotics & Plan (IV/po, length of tx left): NA  Pain Management:No sign of pain noted, scheduled pain meds given previous shift  Tele/VS/O2: VSS on RA  ABNL Lab/BG:None this shift  Diet: Regular,gets room service,nee feeding assist  Bowel/Bladder: Incontinent, pure wick in place  Skin Concerns: scattered bruising , scabs and abrasion on elbows  Drains/Devices: PIV s/l  Patient Stated Goal for Today:none

## 2024-09-10 NOTE — PLAN OF CARE
Physical Therapy Discharge Summary    Reason for therapy discharge:    Discharged to transitional care facility.    Progress towards therapy goal(s). See goals on Care Plan in [facility] electronic health record for goal details.  Goals partially met.  Barriers to achieving goals:   discharge from facility.    Therapy recommendation(s):    Continued therapy is recommended.  Rationale/Recommendations:  Pt currently below baseline and presents after frequent falls and progressive weakness at home. Pt has progressed to ambulating short distances, 10-15', with heavy assist of 1. Rec TCU to maximize safety and IND with mobility prior to returning home. Family hopeful that pt can return to [facility]. If [facility] can provide assist of 1 with all transfers and wheelchair transport for all mobility, could discharge home with HHPT.

## 2024-09-10 NOTE — PLAN OF CARE
Goal Outcome Evaluation:       Patient has been discharged September 10, 2024 11:46 AM. Discharge instructions and education reviewed, medication schedule and follow up appts discussed. Pt had no questions. All belongings sent with pt.

## 2024-09-10 NOTE — DISCHARGE SUMMARY
[facility]    Discharge Summary  Hospitalist    Date of Admission:  9/5/2024  Date of Discharge:  9/10/2024    Discharge Diagnoses      Generalized weakness  New onset atrial fibrillation (H)  Hypotension, unspecified hypotension type  Prostate cancer (H)    History of Present Illness   Migue Beach is an 78 year old male who presented with hypotension, falls and transient new onset A-fib with progressive weakness and reduced oral intake    Hospital Course   Migue Beach was admitted on 9/5/2024.  The following problems were addressed during his hospitalization:      Migue Beach is a 78 year old male registered to short-stay/observation on 9/5/2024 due to fall, new onset atrial fib, hypotension.       Past medical history significant for HTN, HLP, DM2, MDD with anxiety, Insomnia, Metastatic prostate cancer, Chronic pain, Cognitive impairment, Chronic anemia, Slow transit constipation.     Patient presented to the ED via ambulance from home.  Patient's wife reported that the patient has been dizzy and has had several falls over the past few days.  EMS reported that the patient was hypotensive and was in atrial fib.       Work-up in the ED included an EKG that revealed sinus rhythm with PACs, RSR' and prolonged QTc.  CMP revealed a sodium of 134, creatinine of 0.57 with a GFR greater than 90, calcium of 8.4, albumin of 3.2, alkaline phosphatase of 352, AST of 76, glucose of 187 otherwise unremarkable.  CBC with differential revealed a hemoglobin of 9.3, hematocrit of 29.4, platelet count of 145, RBC count of 3.23, RDW of 15.3, absolute lymphocytes of 0.7 otherwise unremarkable.  High-sensitivity troponin T was within normal limits at 17.  Total CK was elevated at 741.     Patient received IV fluid bolus 500 cc while in the ED.       Per report from Dr. Trierweiler, patient had a fall the night prior to presentation and remained on the floor for ~ 3 hours.  Family was unable to get the  patient up off the floor and led to EMS being contacted.  Upon assessment by EMS patient was hypotensive (80/50) and in atrial fib with RVR (HR in the 150's).  En route to [facility] ED patient spontaneously converted back to normal sinus rhythm.  While in the ED,  patient's family reported that the patient has not been eating, drink fluids and has been falling frequently the past few days.       Fall   Bilateral elbow abrasions  Mild rhabdomyolysis  Orthostatic hypotension  Metabolic and toxic encephalopathy  -Head CT shows no acute hemorrhage, mass effect or herniation.  Mottled appearance of the left bilateral bone concerning for osseous serious mets more conspicuous compared to prior imaging.  -CT chest for PE negative for pulmonary embolism but did show possible mild bronchitis.  Interval enlargement and mediastinal and hilar lymphadenopathy with mixed lytic and sclerotic osseous mets.  -Urinalysis negative for infection but did show some glucose and trace ketones.  -Was treated with IV normal saline at 100 cc an hour  - ---1198--778---397  -IV fluids were eventually discontinued once patient was having adequate oral intake.  - Orthostatic significantly positive with blood pressure dropping to 80/48 on standing up.  Patient was quite symptomatic.  1 L normal saline bolus given.  Recheck improved.  Stopped IV normal saline on 9/9/2024.  -Morphine dose was reduced to twice daily and home lorazepam was discontinued due to patient's confusion.  There was concern for toxic encephalopathy secondary to opiates.  -Mentation slowly improved over the course of the stay here although not completely back to baseline.     -PT OT consulted.  Recommending TCU.  -Patient currently living in an assisted living facility.  Patient's family was very much against going to TCU.  Eventually decided to take the patient home once he was medically stable.     Thin subacute subdural hematomas  -Initial head CT did not show any  intracranial bleed but MRI brain was done on 9/8/2024 due to persistent altered mentation.  -MRI shows thin subacute subdural hematomas along bilateral convexities with left greater than right with no mass effect.  Associated dural thickening and enhancement.  Old left occipital infarct.  Known widespread disease.  -Neurosurgery consulted.  No new recommendations.  No need for surgical interventions.  Repeat CT is stable.  -Neurosurgery signed off.     New onset atrial fib with RVR (Resolved-spontaneous conversion back to NSR)  - Monitor on telemetry.    - ECHO done on 9/5/2024 shows EF 55 to 60% with no regional wall motion abnormalities or valvular disease.  -TSH 0.65, magnesium 1.8   - Hold off on starting anticoagulation therapy at this time due to frequent falls and acute on chronic anemia.    -Pulled off his telemetry .  Has been persistently tachycardic.  EKG shows sinus tach with PACs.  Continue to monitor for now.     Hypotension (Resolved)  History of HTN  *Suspected secondary to dehydration and has improved following spontaneous conversion back to NSR and IV fluids.    *Previously on amlodipine but was discontinued 10/2023.    - IV fluids as above.    - Vital signs every 4 hours.       Failure to thrive  Hypoalbuminemia  - IV fluids as above.    - PT consult as above.    -Nutrition services consulted.  Concern for significant malnutrition.  - ensure supplements ordered     Acute on chronic anemia  Mild thrombocytopenia  *Baseline HGB between 11-12.5.  Currently at 8.5  -Continue to monitor.  No signs of active bleeding.  -Thrombocytopenia resolved at this time     Pseudohyponatremia  Sodium of 134 and corrects to 136 due to glucose level of 187.    -Continue to monitor     HLP  *No longer on statin therapy.       Elevated alk phos level  *Suspect secondary to dehydration and metastatic prostate cancer.    - IV fluids.    - Hepatic panel shows elevated AST and normal ALT.  Elevated alk phos.  Likely  secondary to prostate cancer.     Type 2 DM  Hyperglycemia  PTA regimen includes: Metformin 1000 mg daily with supper.  -Hemoglobin A1c at 7.6  - Hold PTA metformin.  Resume at discharge.    - Medium intensity sliding scale insulin ordered.  - Glucose checks ordered.    - Hypoglycemic protocol in place.       MDD with anxiety  - Resumed on PTA Cymbalta 20 mg BID and Zoloft 100 mg/d.    -Scheduled benzodiazepines discontinued.  As needed Ativan still present.     Insomnia  - Resumed on PTA PRN melatonin 3 mg at bedtime.       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  Follows with [facility].  Completed 10 sessions of chemo ~ 1 month ago and had planned to have 3-4 month break before restarting treatment, however PSA level significantly increased and he will be starting Pluvicto soon.    - Resumed on scheduled APAP 1000 mg TID.    - Reduced PTA to MS Contin 15 mg bid and PRN 15 mg daily for sever pain.    - Resumed on PTA PRN oxycodone 10 mg every 4 hours.    - Resumed on PTA prednisone 5 mg BID.    -Oncology team has been consulted.  Reviewed recommendations.  -Plan for outpatient follow-up with radiation oncology to discuss further treatment options.  -Family did request to talk to palliative care.  Consult placed.  This was not completed.  Family did not want to wait for this.  Outpatient follow-up ordered.     Cognitive impairment   *SLUMS score of 9/30 from 9/8/2023.       Slow transit constipation  - Bowel medications available.      Clinically Significant Risk Factors              # Hypoalbuminemia: Lowest albumin = 2.9 g/dL at 9/6/2024  8:02 AM, will monitor as appropriate              # DMII: A1C = 7.6 % (Ref range: <5.7 %) within past 6 months, PRESENT ON ADMISSION        # Financial/Environmental Concerns: none        Beth Taylor MD, MD      Code Status   Full Code       Primary Care Physician   Chris Damon    Physical Exam                       Vitals:    09/07/24 0226 09/08/24 0633   Weight: 69.4 kg (153 lb) 71.5 kg (157 lb 10.1 oz)     Vital Signs with Ranges     I/O last 3 completed shifts:  In: -   Out: 600 [Urine:600]    Physical Exam  Constitutional:       Appearance: He is ill-appearing.   Cardiovascular:      Rate and Rhythm: Regular rhythm. Tachycardia present.      Pulses: Normal pulses.      Heart sounds: Normal heart sounds.   Pulmonary:      Effort: Pulmonary effort is normal. No respiratory distress.      Breath sounds: Normal breath sounds.   Abdominal:      General: Abdomen is flat. Bowel sounds are normal. There is no distension.      Tenderness: There is no abdominal tenderness. There is no guarding.   Skin:     General: Skin is warm and dry.      Coloration: Skin is pale.   Neurological:      Comments: Oriented to place and person but not to time.  Slow to answer questions.  Mild confusion.           Discharge Disposition   Discharged to home  Condition at discharge: Stable    Consultations This Hospital Stay   CARE MANAGEMENT / SOCIAL WORK IP CONSULT  PHYSICAL THERAPY ADULT IP CONSULT  HEMATOLOGY & ONCOLOGY IP CONSULT  NUTRITION SERVICES ADULT IP CONSULT  PHARMACY LIAISON FOR MEDICATION COVERAGE CONSULT  PALLIATIVE CARE ADULT IP CONSULT  NEUROSURGERY IP CONSULT    Time Spent on this Encounter   I, Beth Taylor MD, personally saw the patient today and spent greater than 30 minutes discharging this patient.    Discharge Orders      Home Care Referral      Adult Palliative Care  Referral      Reason for your hospital stay    Fall with intracranial bleed     Follow-up and recommended labs and tests     Follow up with primary care provider, Chris Damon, within 7 days for hospital follow- up.  The following labs/tests are recommended: cbc, bmp in 1 week .     Activity    Your activity upon discharge: activity as tolerated     Diet    Follow this diet upon discharge: Current Diet:Orders Placed This Encounter       Room Service      Snacks/Supplements Adult: Ensure Enlive; Between Meals      Regular Diet Adult     Discharge Medications   Discharge Medication List as of 9/10/2024 10:20 AM        CONTINUE these medications which have CHANGED    Details   !! morphine (MS CONTIN) 15 MG CR tablet Take 1 tablet (15 mg) by mouth every 12 hours., Disp-15 tablet, R-0, E-Prescribe       !! - Potential duplicate medications found. Please discuss with provider.        CONTINUE these medications which have NOT CHANGED    Details   acetaminophen (TYLENOL) 500 MG tablet Take 1,000 mg by mouth 3 times daily, Historical      diphenhydrAMINE-zinc acetate (BENADRYL) 1-0.1 % external cream Apply topically 3 times daily as needed for itchingHistorical      DULoxetine (CYMBALTA) 20 MG capsule Take 20 mg by mouth 2 times daily, Historical      glucose 40 % (400 mg/mL) gel Take 15-30 g by mouth every 15 minutes as needed for low blood sugar (for blood sugar <60 mg/dl)Transitional      LORazepam (ATIVAN) 0.5 MG tablet Take 0.5 mg by mouth 2 times daily as needed for anxiety. At least 6 hours after scheduled dose, Historical      magnesium oxide (MAG-OX) 400 MG tablet Take 1 tablet (400 mg) by mouth daily, Transitional      melatonin 1 MG TABS tablet Take 3 tablets (3 mg) by mouth nightly as needed for sleep, Transitional      metFORMIN (GLUCOPHAGE) 500 MG tablet Take 500 mg by mouth 2 times daily (with meals)., Historical      !! morphine (MS CONTIN) 15 MG CR tablet Take 15 mg by mouth daily as needed for severe pain., Historical      naloxone (NARCAN) 4 MG/0.1ML nasal spray Spray 1 spray (4 mg) into one nostril alternating nostrils as needed for opioid reversal every 2-3 minutes until assistance arrives, Disp-0.2 mL, R-0, E-Prescribe      ondansetron (ZOFRAN ODT) 4 MG ODT tab Take 1 tablet (4 mg) by mouth every 6 hours as needed for nausea or vomiting, Transitional      oxyCODONE IR (ROXICODONE) 10 MG tablet Take 10 mg by mouth every 4 hours as  needed for severe pain (pain greater than 5/10 on numerical pain scale)., Historical      predniSONE (DELTASONE) 5 MG tablet Take 5 mg by mouth 2 times daily., Historical      !! senna-docusate (SENOKOT-S/PERICOLACE) 8.6-50 MG tablet Take 2 tablets by mouth daily., Historical      !! senna-docusate (SENOKOT-S/PERICOLACE) 8.6-50 MG tablet Take 1 tablet by mouth 2 times daily as needed for constipation, Transitional      sertraline (ZOLOFT) 100 MG tablet Take 100 mg by mouth every morning., Historical       !! - Potential duplicate medications found. Please discuss with provider.        Allergies   No Known Allergies  Data   Recent Labs   Lab Test 09/09/24  0916 09/08/24  1503 09/07/24  1815   WBC 4.5 4.8 4.5   HGB 8.3* 7.9* 8.5*   MCV 90 91 93    138* 142*      Recent Labs   Lab Test 09/10/24  0814 09/10/24  0207 09/09/24  2126 09/06/24  0832 09/06/24  0802 09/05/24  1810 09/05/24  1150 06/20/24  1023 03/26/24  1000   NA  --   --   --   --  134*  --  134*  --  140   POTASSIUM  --   --   --   --  3.7  --  4.1  --  4.2   CHLORIDE  --   --   --   --  101  --  100  --  102   CO2  --   --   --   --  22  --  23  --  26   BUN  --   --   --   --  7.3*  --  13.3  --  11.3   CR  --   --   --   --  0.41*  --  0.57* 0.6* 0.62*   ANIONGAP  --   --   --   --  11  --  11  --  12   MARKELL  --   --   --   --  8.2*  --  8.4*  --  8.9   * 168* 190*   < > 157*   < > 187*  --  132*    < > = values in this interval not displayed.         Results for orders placed or performed during the hospital encounter of 09/05/24   Head CT w/o contrast    Narrative    CT SCAN OF THE HEAD WITHOUT CONTRAST September 5, 2024 2:52 PM     HISTORY: Closed head injury.    TECHNIQUE: Axial images of the head and coronal reformations without  IV contrast material. Radiation dose for this scan was reduced using  automated exposure control, adjustment of the mA and/or kV according  to patient size, or iterative reconstruction technique.    COMPARISON:  Brain MR 9/3/2023. Head CT 9/3/2023.    FINDINGS: No acute intracranial hemorrhage. No mass effect or midline  shift. Mild diffuse parenchymal volume loss. Ventricular size is  within normal limits without evidence of hydrocephalus. Gray-white  matter differentiation appears intact.    Mild scattered mucosal thickening in the paranasal sinuses. Mild  mottled and lytic appearance of the left parietal bone concerning for  bony metastases. This appears more conspicuous since 3/3/2023.      Impression    IMPRESSION:     1. No acute intracranial hemorrhage, mass effect, or herniation.  2. Mottled appearance of the left parietal bone concerning for osseous  metastases. This is more conspicuous since 9/3/2023.      CALEB URENA MD         SYSTEM ID:  [id number]   CT Chest Pulmonary Embolism w Contrast    Narrative    CT CHEST PULMONARY EMBOLISM W CONTRAST 9/5/2024 2:49 PM    CLINICAL HISTORY: tachycardia, immobility  TECHNIQUE: CT angiogram chest during arterial phase injection IV  contrast. 2D and 3D MIP reconstructions were performed by the CT  technologist. Dose reduction techniques were used.     CONTRAST: 66 cc [id number]    COMPARISON: Chest CT 3/28/2024    FINDINGS:  ANGIOGRAM CHEST: Pulmonary arteries are normal caliber and negative  for pulmonary emboli. Thoracic aorta is not well opacified and is  indeterminate for dissection. No CT evidence of right heart strain.    LUNGS AND PLEURA: No focal airspace consolidation or pleural effusion.  There is some scattered bronchial wall thickening and mucous plugging.  Scattered small pulmonary nodules are stable from prior exam. No  definite new or enlarging nodules.    MEDIASTINUM/AXILLAE: Interval enlargement of mediastinal and hilar  lymphadenopathy with representative right hilar lymph node measuring  measuring 2.9 cm in short axis, previously 1.7 cm (series 5 image 117)  and subcarinal node measuring 2.4 cm in short axis, previously 1.4 cm   (series 5 image  141).    CORONARY ARTERY CALCIFICATION: Severe.    UPPER ABDOMEN: Unremarkable.    MUSCULOSKELETAL: Mixed lytic and sclerotic osseous lesions are again  noted. No definite acute bony abnormality.      Impression    IMPRESSION:  1.  Possible mild bronchitis.   2.  No pulmonary embolus.  3.  Interval enlargement of mediastinal and hilar lymphadenopathy.  4.  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    SEBLE CALLAHAN MD         SYSTEM ID:  [id number]   MR Brain w/o & w Contrast    Narrative    EXAM: MR BRAIN W/O and W CONTRAST  LOCATION: [facility]  DATE: 9/8/2024    INDICATION: Neurological symptoms, stroke.  COMPARISON: Head CT 9/5/2024.  CONTRAST: 7 mL Sam  TECHNIQUE: Routine multiplanar multisequence head MRI without and with intravenous contrast.    FINDINGS:  Thin subacute subdural hematomas along the bilateral convexities, left larger than right. The larger left convexity subdural hematoma measures up to about 3 mm in thickness. Questionable thin subdural hemorrhage along the falx. Associated presumed   reactive dural thickening and enhancement. Probable subtle correlate subdural hematoma along the left convexity in retrospect on the prior head CT. No high-grade mass effect.    No convincing acute infarct. Questionable subtle diffusion changes within the felice (for example, series 602 image 10) which may be artifactual. Volume loss is present with scattered nonspecific white matter T2 hyperintensities likely representing chronic   small vessel ischemic change. 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    Bone marrow demonstrates diffuse less than typically expected patchy areas of T1 hypointense signal with areas of T2 hyperintense signal and enhancement most conspicuous involving the left parietal bone with mild paranasal sinus mucosal thickening.   Small-volume fluid signal scattered throughout the bilateral mastoid cavities.      Impression    IMPRESSION:  1.  Thin  subacute subdural hematomas along the bilateral convexities, left larger than right. No high-grade mass effect.  2.  Associated dural thickening and enhancement, nonspecific, likely reactive.  3.  No convincing acute infarct.  4.  Questionable subtle diffusion changes within the felice which may be artifactual (for example series 602 image 10).  5.  Old left occipital infarct.  6.  Known widespread osseous metastatic disease.   CT Head w/o Contrast    Narrative    CT SCAN OF THE HEAD WITHOUT CONTRAST September 9, 2024 8:50 AM     HISTORY: Followup [facility].    TECHNIQUE: Axial images of the head and coronal reformations without  IV contrast material. Radiation dose for this scan was reduced using  automated exposure control, adjustment of the mA and/or kV according  to patient size, or iterative reconstruction technique.    COMPARISON: Brain MR 9/8/2024. Head CT 9/5/2024.    FINDINGS: Images are mildly degraded by motion artifact. No acute  intraparenchymal hemorrhage appreciated. No mass effect or midline  shift. Mild diffuse parenchymal volume loss. Patchy periventricular  white matter hypodensities are nonspecific, but most likely related to  chronic microvascular ischemic disease.    Mottled appearance of the calvarium compatible with osseous metastatic  disease, better seen on CT. Small left mastoid effusion likely near a  lytic lesion in that area. Mild extra-axial soft tissue along the  subdural space on the left could represent dural thickening or  subdural hematoma. No significant right-sided extra-axial fluid  collection appreciated by CT although this may be due to differences  in technique.    Mild mucosal thickening in the maxillary sinuses.       Impression    IMPRESSION:     1. No acute intraparenchymal hemorrhage, mass effect, or herniation.  2. Bony metastases with mottled appearance of the calvarium, better  appreciated on MR. Extra-axial dural thickening along the left  cerebral convexity may relate to  neoplasm versus less likely small  amount of subdural blood. This is much better appreciated by MR.  Previously seen right-sided extra-axial collection is not appreciated  by head CT.    CALEB URENA MD         SYSTEM ID:  [id number]   Echocardiogram Complete     Value    LVEF  55-60%    Narrative    [id number]  [id number]  [id number]  [id number]^ABIGAIL^VINICIUS^EARL     [facility]  Echocardiography Laboratory  [address] [place] [address]     Name: USAMA SAL  MRN: [id number]  : 1945  Study Date: 2024 08:04 AM  Age: 78 yrs  Gender: Male  Patient Location: [facility]  Reason For Study: Atrial Fibrillation  Ordering Physician: VINICIUS HAYES  Referring Physician: Laz Lewis MD  Performed By: Kaitlynn Barrera RDCS     BSA: 1.9 m2  Height: 67 in  Weight: 173 lb  HR: 100  BP: 138/72 mmHg  ______________________________________________________________________________  Procedure  Complete Portable Echo Adult. Optison (NDC #2007-0960) given intravenously.  Complete Echo Adult.  ______________________________________________________________________________  Interpretation Summary     The visual ejection fraction is 55-60%.  No regional wall motion abnormalities noted.  Normal left atrial size.  No significant valvular heart disease.  Technically challenging study reduces sensitivity and specificity of findings.  ______________________________________________________________________________  Left Ventricle  The left ventricle is normal in size. There is normal left ventricular wall  thickness. Diastolic Doppler findings (E/E' ratio and/or other parameters)  suggest left ventricular filling pressures are indeterminate. The visual  ejection fraction is 55-60%. No regional wall motion abnormalities noted.     Right Ventricle  The right ventricle is normal in size and function.     Atria  Normal left atrial size. Right atrial size is normal. There is no color  Doppler  evidence of an atrial shunt.     Mitral Valve  The mitral valve leaflets are mildly thickened. There is trace mitral  regurgitation.     Tricuspid Valve  Tricuspid leaflets are thickened. There is trace tricuspid regurgitation. IVC  diameter and respiratory changes fall into an intermediate range suggesting an  RA pressure of 8 mmHg.     Aortic Valve  There is trivial trileaflet aortic sclerosis. No aortic regurgitation is  present.     Pulmonic Valve  There is trace pulmonic valvular regurgitation.     Vessels  Normal size aorta. The aortic root is normal size.     Pericardium  There is no pericardial effusion.     Rhythm  Sinus rhythm was noted.  ______________________________________________________________________________  MMode/2D Measurements & Calculations  Ao root diam: 3.5 cm  LA dimension: 3.1 cm     asc Aorta Diam: 3.5 cm  LA/Ao: 0.88  LVOT diam: 2.2 cm  LVOT area: 3.8 cm2  Ao root diam index Ht(cm/m): 2.1  Ao root diam index BSA (cm/m2): 1.8  Asc Ao diam index BSA (cm/m2): 1.8  Asc Ao diam index Ht(cm/m): 2.1  TAPSE: 1.4 cm     Doppler Measurements & Calculations  MV E max frandy: 80.7 cm/sec  MV A max frandy: 92.2 cm/sec  MV E/A: 0.88  MV dec time: 0.15 sec  PA acc time: 0.13 sec  E/E' av.7  Lateral E/e': 11.6  Medial E/e': 11.8  RV S Frandy: 12.9 cm/sec     ______________________________________________________________________________  Report approved by: Sierra Hodgson 2024 10:18 AM

## 2024-09-10 NOTE — PROGRESS NOTES
"PRIMARY Concern: Fall  SAFETY RISK Concerns (fall risk, behaviors, etc.): Fall risk      Isolation/Type: NA  Tests/Procedures for NEXT shift: Head CT  Consults? (Pending/following, signed-off?) Neurosurgery and palliative following  Where is patient from? (Home, TCU, etc.): Lives at [facility]   Other Important info for NEXT shift: . Neuros intact   Anticipated DC date & active delays: TBD     SUMMARY NOTE:  Orientation/Cognitive: A&O, disoriented to place, time. situation   Observation Goals (Met/ Not Met): Not met.   Mobility Level/Assist Equipment: Ax2 gait belt/walker  Antibiotics & Plan (IV/po, length of tx left): No  Pain Management: scheduled morphine and Tele/VS/O2: VSS/RA  ABNL Lab/BG:  See chart flowsheet  Diet: regular, needs assistance with feeding  Bowel/Bladder: Incontinent of urine/has external male cath  Skin Concerns: Abrasion to bilat elbows, scattered bruising   Drains/Devices: PIV saline locked.   Patient Stated Goal for Today: \"sleep\"    "

## 2024-09-10 NOTE — PROGRESS NOTES
Care Management Discharge Note    Discharge Date: 09/10/2024       Discharge Disposition:  Assisted Living     Discharge Services:  Transportation     Discharge DME:      Discharge Transportation:  Health stretcher [phone]    Private pay costs discussed: Not applicable    Does the patient's insurance plan have a 3 day qualifying hospital stay waiver?  Yes     Which insurance plan 3 day waiver is available? Alternative insurance waiver    Will the waiver be used for post-acute placement? Yes    PAS Confirmation Code:    Patient/family educated on Medicare website which has current facility and service quality ratings: yes    Education Provided on the Discharge Plan:    Persons Notified of Discharge Plans: Hospitalist, Charge RN, bedside RN, family, care home   Patient/Family in Agreement with the Plan: other (see comments) (PT recomending TCU, family not on board)    Handoff Referral Completed: No, handoff not indicated or clinically appropriate    Additional Information:  Patient is discharging back to [facility] today,  Health stretcher transport arranged for [phone]. PCS completed. Faxed discharge orders to [facility] and [facility] health [facility]. New medications being sent to [facility] pharmacy out of [place] (not [place], as previously indicated). Hospitalist confirmed outpatient palliative consult placed. Spoke with RICHAR Ha from [facility] this morning to confirm all discharge plans, she is in agreement. Indicated to [facility] health care that best contact is fany Le. Called fany Le to go over discharge plans, he is in agreement.     ERIBERTO Ugalde  Social Work  [facility]

## 2024-09-10 NOTE — PROGRESS NOTES
PRIMARY Concern: Here with generalized weakness and fall  SAFETY RISK Concerns (fall risk, behaviors, etc.): Fall risk      Isolation/Type: NA  Tests/Procedures for NEXT shift:BS checks  Consults? (Pending/following, signed-off?) Neurosurgery/SW and palliative following  Where is patient from? (Home, TCU, etc.):[facility]   Other Important info for NEXT shift:Was to be seen by palliative team today but pt discharging this morning, seems family will pursue outpt palliative if still needed.  Anticipated DC date & active delays: Today-ride set up between [id number]     SUMMARY NOTE:  Orientation/Cognitive: A&O to self only, confused  Observation Goals (Met/ Not Met): Not met.   Mobility Level/Assist Equipment: Ax2 GB/W and sometimes lift depending on strength  Antibiotics & Plan (IV/po, length of tx left): NA  Pain Management: Scheduled morphine and Tylenol for chronic pain.  Tele/VS/O2: VSS on RA  ABNL Lab/BG:None this shift  Diet: Regular,gets room service,needs feeding assist  Bowel/Bladder: Incontinent, primofit in place.  Skin Concerns: scattered bruising , scabs and abrasion on elbows  Drains/Devices: PIV s/l  Patient Stated Goal for Today:none

## 2024-09-11 NOTE — PROGRESS NOTES
Connected Care Resource Center: [facility]    Background: Transitional Care Management program identified per system criteria and reviewed by [facility] Resource [place] team for possible outreach.    Assessment: Upon chart review, [facility] Team member will not proceed with patient outreach related to this episode of Transitional Care Management program due to reason below:    Patient has discharged to a Memory Care, Long-term Care, Assisted Living or Group Home where patient is receiving on-site support with their daily cares, including support with hospital follow up plan.    Plan: Transitional Care Management episode addressed appropriately per reason noted above.      Stephanie Lujan RN  Connected Care Resource [place], [facility]    *Connected Care Resource Team does NOT follow patient ongoing. Referrals are identified based on internal discharge reports and the outreach is to ensure patient has an understanding of their discharge instructions.

## (undated) RX ORDER — HEPARIN SODIUM (PORCINE) LOCK FLUSH IV SOLN 100 UNIT/ML 100 UNIT/ML
SOLUTION INTRAVENOUS
Status: DISPENSED
Start: 2024-01-01